# Patient Record
Sex: FEMALE | Race: WHITE | NOT HISPANIC OR LATINO | Employment: OTHER | URBAN - METROPOLITAN AREA
[De-identification: names, ages, dates, MRNs, and addresses within clinical notes are randomized per-mention and may not be internally consistent; named-entity substitution may affect disease eponyms.]

---

## 2017-01-02 RX ORDER — FLUVASTATIN SODIUM 80 MG/1
TABLET, FILM COATED, EXTENDED RELEASE ORAL
Qty: 30 TABLET | Refills: 11 | Status: SHIPPED | OUTPATIENT
Start: 2017-01-02 | End: 2017-02-16

## 2017-01-17 ENCOUNTER — CONSULT (OUTPATIENT)
Dept: SLEEP MEDICINE | Facility: HOSPITAL | Age: 72
End: 2017-01-17

## 2017-01-17 VITALS
HEART RATE: 75 BPM | WEIGHT: 171 LBS | BODY MASS INDEX: 28.49 KG/M2 | DIASTOLIC BLOOD PRESSURE: 82 MMHG | HEIGHT: 65 IN | SYSTOLIC BLOOD PRESSURE: 138 MMHG

## 2017-01-17 DIAGNOSIS — R29.818 SUSPECTED SLEEP APNEA: Primary | ICD-10-CM

## 2017-01-17 DIAGNOSIS — I10 ESSENTIAL HYPERTENSION: ICD-10-CM

## 2017-01-17 PROCEDURE — 99204 OFFICE O/P NEW MOD 45 MIN: CPT | Performed by: INTERNAL MEDICINE

## 2017-01-17 RX ORDER — ALPRAZOLAM 0.25 MG/1
TABLET ORAL
COMMUNITY
Start: 2016-11-22 | End: 2017-02-16

## 2017-01-17 NOTE — MR AVS SNAPSHOT
[facility] SLEEP MEDICINE  [phone]                    Leila Pierre   1/17/2017 10:30 AM   Consult    Dept Phone:  [phone]   Encounter #:  [id number]    Provider:  Brian Raza MD   Department:  [facility] SLEEP MEDICINE                Your Full Care Plan              Your Updated Medication List          This list is accurate as of: 1/17/17 11:33 AM.  Always use your most recent med list.                ALPRAZolam 0.25 MG tablet   Commonly known as:  XANAX       amLODIPine 10 MG tablet   Commonly known as:  NORVASC       aspirin 81 MG EC tablet       azilsartan medoxomil 80 MG tablet tablet   Commonly known as:  EDARBI   Take 1 tablet by mouth Daily.       BIOTIN 5000 PO       CALCIUM 600 PO       carvedilol 25 MG tablet   Commonly known as:  COREG   Take 1 tablet by mouth 2 (Two) Times a Day With Meals.       cholecalciferol 1000 UNITS tablet   Commonly known as:  VITAMIN D3       CloNIDine 0.1 MG/24HR patch   Commonly known as:  CATAPRES-TTS   Place 1 patch on the skin 1 (One) Time Per Week.       famotidine 20 MG tablet   Commonly known as:  PEPCID       fexofenadine 180 MG tablet   Commonly known as:  ALLEGRA       fish oil 1200 MG capsule capsule       fluvastatin XL 80 MG 24 hr tablet   Commonly known as:  LESCOL XL   TAKE ONE TABLET BY MOUTH EVERY NIGHT AT BEDTIME       furosemide 20 MG tablet   Commonly known as:  LASIX   Take 1 tablet by mouth 2 (Two) Times a Day.       levothyroxine 88 MCG tablet   Commonly known as:  SYNTHROID, LEVOTHROID       metFORMIN 500 MG tablet   Commonly known as:  GLUCOPHAGE       potassium chloride 10 MEQ CR tablet   Commonly known as:  KLOR-CON   Take 1 tablet by mouth 2 (Two) Times a Day.               You Were Diagnosed With        Codes Comments    Suspected sleep apnea    -  Primary ICD-10-CM: G47.30  ICD-9-CM: 780.57     Essential hypertension     ICD-10-CM: I10  ICD-9-CM: 401.9       Instructions     None    Patient Instructions History      Upcoming Appointments     Visit Type Date Time Department    CONSULT 1/17/2017 10:30 AM [facility]EP [facility]    FOLLOW UP 1/24/2017 10:00 AM [facility] [place]    HOME SLEEP STUDY 1/26/2017  1:15 PM  [facility]    FOLLOW UP 2/16/2017 11:45 AM [facility]    FOLLOW UP 3/28/2017 10:30 AM [facility]      MyChart Signup     Our records indicate that you have an active [employer] day[employer]a account.    You can view your After Visit Summary by going to Wiren Board and logging in with your Fotoshkola username and password.  If you don't have a Fotoshkola username and password but a parent or guardian has access to your record, the parent or guardian should login with their own Fotoshkola username and password and access your record to view the After Visit Summary.    If you have questions, you can email [email or web address] or call [phone] to talk to our Fotoshkola staff.  Remember, Fotoshkola is NOT to be used for urgent needs.  For medical emergencies, dial 911.               Other Info from Your Visit           Your Appointments     Jan 24, 2017 10:00 AM EST   Follow Up with Ike Cifuentes MD   [facility] FAMILY MEDICINE (--)    [address]  [facility] [address] [phone]           Arrive 15 minutes prior to appointment.            Jan 26, 2017  1:15 PM EST   HOME SLEEP STUDY with  MIGUEL HST PDX 1   [facility] SLEEP LAB ([place])    [address] [id number] [phone]            Feb 16, 2017 11:45 AM EST   Follow Up with Uziel Meza MD   [facility] CARDIOLOGY (--)    [address] [place] [address] [address]  [place] [id number] [phone]           Arrive 15 minutes prior to appointment.            Mar 28, 2017 10:30 AM EDT   Follow Up with Brian Raza MD   [facility] SLEEP MEDICINE (--)    [facility]  "[place] [address] [address] [phone]           Please bring completed new patient packets that were mailed to you prior to follow up as well as bringing a copy of insurnace card and photo ID to visit. Please bring downloadable chips/cards out of machines if you are on PAP therapy.              Allergies     Clarithromycin      Decongest-aid [Pseudoephedrine]      Gives too much energy and patient is unable to sleep    Irbesartan      Sulfa Antibiotics      Valsartan        Reason for Visit     Sleeping Problem           Vital Signs     Blood Pressure Pulse Height Weight Body Mass Index Smoking Status    138/82 75 65\" (165.1 cm) 171 lb (77.6 kg) 28.46 kg/m2 Never Smoker      Problems and Diagnoses Noted     High blood pressure    Suspected sleep apnea        "

## 2017-01-17 NOTE — PROGRESS NOTES
Subjective   Leila Pierre is a 71 y.o. female.   Chief Complaint   Patient presents with   • Sleeping Problem       HPI     71 y.o. female seen in consultation at the request of Uziel Meza MD for evaluation of the above.     She has had increasingly difficult to control blood pressure.  She suddenly had blood pressures up in the 200 systolic range.  She is requiring 3 medications and a clonidine patch currently.  She saw Dr. Meza who adjusted her medications.  He thought obstructive sleep apnea may be contributing to her poorly controlled blood pressure.  She does not have much in the way of daytime somnolence.  She is aware of sleep apnea and CPAP therapy as her  is on a CPAP.    Further details are as follows:    [place] Scale is: 1/24    Estimated average amount of sleep per night: 7  Number of times she wakes up at night: 1  Difficulty falling back asleep: no  It usually takes 5 minutes to go to sleep.  She feels sleepy upon waking up: yes  Rotating or night shift work: no    Drowsiness/Sleepiness:  She exhibits the following:  none    Snoring/Breathing:  She exhibits the following:  none    Reflux:  She describes the following:  wakes up at night with a sour taste or burning sensation in chest  takes medication for reflux  eats 3 meals daily     Narcolepsy:  She exhibits the following:  none    RLS/PLMs:  She describes the following:  none    Insomnia:  She describes the following:  none    Parasomnia:  She exhibits the following:  none    Neurological:  She has a history of the following:  none    Weight:  Weight change in the last year:  loss: 20 lbs      The patient's relevant past medical, surgical, family, and social history reviewed and updated in Epic as appropriate.    Current medications are:   Current Outpatient Prescriptions:   •  amLODIPine (NORVASC) 10 MG tablet, Take 10 mg by mouth Daily., Disp: , Rfl:   •  aspirin 81 MG EC tablet, Take 81 mg by mouth Daily., Disp: , Rfl:   •   azilsartan medoxomil (EDARBI) 80 MG tablet tablet, Take 1 tablet by mouth Daily., Disp: 30 tablet, Rfl: 11  •  BIOTIN 5000 PO, Take 1 tablet by mouth Daily., Disp: , Rfl:   •  Calcium Carbonate (CALCIUM 600 PO), Take 1 tablet by mouth Daily., Disp: , Rfl:   •  carvedilol (COREG) 25 MG tablet, Take 1 tablet by mouth 2 (Two) Times a Day With Meals., Disp: 60 tablet, Rfl: 11  •  cholecalciferol (VITAMIN D3) 1000 UNITS tablet, Take 1,000 Units by mouth Daily., Disp: , Rfl:   •  CloNIDine (CATAPRES-TTS) 0.1 MG/24HR patch, Place 1 patch on the skin 1 (One) Time Per Week., Disp: 4 patch, Rfl: 6  •  famotidine (PEPCID) 20 MG tablet, Take 20 mg by mouth 2 (Two) Times a Day., Disp: , Rfl:   •  fexofenadine (ALLEGRA) 180 MG tablet, Take 180 mg by mouth Daily., Disp: , Rfl:   •  furosemide (LASIX) 20 MG tablet, Take 1 tablet by mouth 2 (Two) Times a Day., Disp: 180 tablet, Rfl: 3  •  levothyroxine (SYNTHROID, LEVOTHROID) 88 MCG tablet, Take 88 mcg by mouth daily., Disp: , Rfl:   •  metFORMIN (GLUCOPHAGE) 500 MG tablet, Take 500 mg by mouth 4 (Four) Times a Day., Disp: , Rfl:   •  Omega-3 Fatty Acids (FISH OIL) 1200 MG capsule capsule, Take 1,200 mg by mouth 2 (Two) Times a Day., Disp: , Rfl:   •  potassium chloride (KLOR-CON) 10 MEQ CR tablet, Take 1 tablet by mouth 2 (Two) Times a Day., Disp: 180 tablet, Rfl: 3  •  ALPRAZolam (XANAX) 0.25 MG tablet, , Disp: , Rfl:   •  fluvastatin XL (LESCOL XL) 80 MG 24 hr tablet, TAKE ONE TABLET BY MOUTH EVERY NIGHT AT BEDTIME, Disp: 30 tablet, Rfl: 11.    Review of Systems    Review of Systems   Respiratory: Negative for shortness of breath.    Cardiovascular: Negative for chest pain and palpitations.   Musculoskeletal: Negative for neck pain.   Neurological: Negative for headaches.   All other systems reviewed and are negative.    ROS documented in patient questionnaire ×12 systems.  Reviewed with patient.  Otherwise negative except as noted in HPI.    Physical Exam    Blood pressure  "138/82, pulse 75, height 65\" (165.1 cm), weight 171 lb (77.6 kg). Body mass index is 28.46 kg/(m^2).    Physical Exam   Constitutional: She is oriented to person, place, and time. She appears well-developed and well-nourished.   HENT:   Head: Normocephalic and atraumatic.   Nose: Nose normal.   Mouth/Throat: Oropharynx is clear and moist. No oropharyngeal exudate.   Eyes: Conjunctivae are normal. Pupils are equal, round, and reactive to light. No scleral icterus.   Neck: Neck supple. No tracheal deviation present. No thyromegaly present.   Cardiovascular: Normal rate and regular rhythm.  Exam reveals no gallop and no friction rub.    No murmur heard.  Pulmonary/Chest: Effort normal. No respiratory distress. She has no wheezes. She has no rales. She exhibits no tenderness.   Abdominal: Soft. Bowel sounds are normal. She exhibits no distension. There is no tenderness.   Musculoskeletal: She exhibits no edema or deformity.   Neurological: She is alert and oriented to person, place, and time.   Skin: Skin is warm and dry. No rash noted.   Psychiatric: She has a normal mood and affect. Her behavior is normal. Judgment and thought content normal.   Nursing note and vitals reviewed.      ASSESSMENT:    Problem List Items Addressed This Visit     Suspected sleep apnea - Primary    Relevant Orders    Home Sleep Study    Hypertension    Relevant Orders    Home Sleep Study          Evidence of obstructive sleep apnea based upon difficult to control blood pressure as well as increased neck circumference.    PLAN:    The risks of untreated obstructive sleep apnea were discussed as well as treatment options. I have ordered a home polysomnogram. We discussed initiating CPAP if appropriate.  There will be a long-term goal of weight loss. I have asked her to follow-up after the sleep study is complete.    I have reviewed the results of my evaluation and impressions with the patient and/or appropriate family.    Thank you very much " for allowing me to participate in the care of your patient.        Signed by  Brian Raza MD    January 17, 2017      CC: MD Derrick Champion Michael R, MD

## 2017-01-24 ENCOUNTER — HOSPITAL ENCOUNTER (OUTPATIENT)
Dept: GENERAL RADIOLOGY | Facility: HOSPITAL | Age: 72
Discharge: HOME | End: 2017-01-24
Attending: FAMILY MEDICINE | Admitting: FAMILY MEDICINE

## 2017-01-24 ENCOUNTER — OFFICE VISIT (OUTPATIENT)
Dept: FAMILY MEDICINE CLINIC | Facility: CLINIC | Age: 72
End: 2017-01-24

## 2017-01-24 VITALS
HEART RATE: 66 BPM | BODY MASS INDEX: 28.46 KG/M2 | HEIGHT: 65 IN | OXYGEN SATURATION: 98 % | DIASTOLIC BLOOD PRESSURE: 72 MMHG | SYSTOLIC BLOOD PRESSURE: 112 MMHG | TEMPERATURE: 97.8 F | WEIGHT: 170.8 LBS | RESPIRATION RATE: 16 BRPM

## 2017-01-24 DIAGNOSIS — G89.29 CHRONIC MIDLINE THORACIC BACK PAIN: ICD-10-CM

## 2017-01-24 DIAGNOSIS — E11.9 TYPE 2 DIABETES MELLITUS WITHOUT COMPLICATION, WITHOUT LONG-TERM CURRENT USE OF INSULIN (HCC): Primary | ICD-10-CM

## 2017-01-24 DIAGNOSIS — M54.6 CHRONIC MIDLINE THORACIC BACK PAIN: ICD-10-CM

## 2017-01-24 DIAGNOSIS — I10 ESSENTIAL HYPERTENSION: ICD-10-CM

## 2017-01-24 LAB — HBA1C MFR BLD: 5.9 %

## 2017-01-24 PROCEDURE — 83036 HEMOGLOBIN GLYCOSYLATED A1C: CPT | Performed by: FAMILY MEDICINE

## 2017-01-24 PROCEDURE — 72072 X-RAY EXAM THORAC SPINE 3VWS: CPT

## 2017-01-24 PROCEDURE — 99214 OFFICE O/P EST MOD 30 MIN: CPT | Performed by: FAMILY MEDICINE

## 2017-01-24 RX ORDER — FUROSEMIDE 20 MG/1
20 TABLET ORAL 2 TIMES DAILY
Qty: 180 TABLET | Refills: 1 | Status: SHIPPED | OUTPATIENT
Start: 2017-01-24 | End: 2017-07-25 | Stop reason: SDUPTHER

## 2017-01-24 RX ORDER — BACLOFEN 10 MG/1
10 TABLET ORAL 3 TIMES DAILY
Qty: 30 TABLET | Refills: 1 | Status: SHIPPED | OUTPATIENT
Start: 2017-01-24 | End: 2017-02-16

## 2017-01-24 NOTE — MR AVS SNAPSHOT
Leila Pierre   1/24/2017 10:00 AM   Office Visit    Provider:  Ike Cifuentes MD   Department:  [facility] FAMILY MEDICINE   Dept Phone:  [phone]                Your Full Care Plan              Today's Medication Changes          These changes are accurate as of: 1/24/17 10:29 AM.  If you have any questions, ask your nurse or doctor.               New Medication(s)Ordered:     baclofen 10 MG tablet   Commonly known as:  LIORESAL   Take 1 tablet by mouth 3 (Three) Times a Day.   Started by:  Ike Cifuentes MD            Where to Get Your Medications      These medications were sent to [address] [facility] AT [facility] - [phone]  - [phone] FX  [address] [facility] [address]     Phone:  [phone]     baclofen 10 MG tablet    furosemide 20 MG tablet                  Your Updated Medication List          This list is accurate as of: 1/24/17 10:29 AM.  Always use your most recent med list.                ALPRAZolam 0.25 MG tablet   Commonly known as:  XANAX       amLODIPine 10 MG tablet   Commonly known as:  NORVASC       aspirin 81 MG EC tablet       azilsartan medoxomil 80 MG tablet tablet   Commonly known as:  EDARBI   Take 1 tablet by mouth Daily.       baclofen 10 MG tablet   Commonly known as:  LIORESAL   Take 1 tablet by mouth 3 (Three) Times a Day.       BIOTIN 5000 PO       CALCIUM 600 PO       carvedilol 25 MG tablet   Commonly known as:  COREG   Take 1 tablet by mouth 2 (Two) Times a Day With Meals.       cholecalciferol 1000 UNITS tablet   Commonly known as:  VITAMIN D3       CloNIDine 0.1 MG/24HR patch   Commonly known as:  CATAPRES-TTS   Place 1 patch on the skin 1 (One) Time Per Week.       famotidine 20 MG tablet   Commonly known as:  PEPCID       fexofenadine 180 MG tablet   Commonly known as:  ALLEGRA       fish oil 1200 MG capsule capsule       fluvastatin XL 80 MG 24 hr tablet    Commonly known as:  LESCOL XL   TAKE ONE TABLET BY MOUTH EVERY NIGHT AT BEDTIME       furosemide 20 MG tablet   Commonly known as:  LASIX   Take 1 tablet by mouth 2 (Two) Times a Day.       levothyroxine 88 MCG tablet   Commonly known as:  SYNTHROID, LEVOTHROID       metFORMIN 500 MG tablet   Commonly known as:  GLUCOPHAGE       potassium chloride 10 MEQ CR tablet   Commonly known as:  KLOR-CON   Take 1 tablet by mouth 2 (Two) Times a Day.               We Performed the Following     POC Glycosylated Hemoglobin (Hb A1C)       You Were Diagnosed With        Codes Comments    Type 2 diabetes mellitus without complication, without long-term current use of insulin    -  Primary ICD-10-CM: E11.9  ICD-9-CM: 250.00     Essential hypertension     ICD-10-CM: I10  ICD-9-CM: 401.9     Chronic midline thoracic back pain     ICD-10-CM: M54.6, G89.29  ICD-9-CM: 724.1, 338.29       Instructions     None    Patient Instructions History      Mpayyhart Signup     Our records indicate that you have an active TerraPower account.    You can view your After Visit Summary by going to [employer] and logging in with your Net 263 username and password.  If you don't have a Net 263 username and password but a parent or guardian has access to your record, the parent or guardian should login with their own Net 263 username and password and access your record to view the After Visit Summary.    If you have questions, you can email [email or web address] or call [phone] to talk to our Net 263 staff.  Remember, Net 263 is NOT to be used for urgent needs.  For medical emergencies, dial 911.               Other Info from Your Visit           Your Appointments     Jan 26, 2017  1:15 PM EST   HOME SLEEP STUDY with  MIGUEL HST PDX 1   [facility] SLEEP LAB ([place])    [address] [facility] [address] [phone]            Feb 16, 2017 11:45 AM EST   Follow Up with Uziel Meza,  "MD   [facility] CARDIOLOGY (--)    [address]  [facility] [id number] [phone]           Arrive 15 minutes prior to appointment.            Mar 28, 2017 10:30 AM EDT   Follow Up with Brian Raza MD   [facility] SLEEP MEDICINE (--)    [address]  [facility] [id number] [phone]           Please bring completed new patient packets that were mailed to you prior to follow up as well as bringing a copy of insurnace card and photo ID to visit. Please bring downloadable chips/cards out of machines if you are on PAP therapy.            Apr 25, 2017 10:00 AM EDT   Office Visit with Ike Cifuentes MD   [facility] FAMILY MEDICINE (--)    [address]  [facility] [id number] [phone]           Arrive 15 minutes prior to appointment.              Allergies     Clarithromycin      Decongest-aid [Pseudoephedrine]      Gives too much energy and patient is unable to sleep    Irbesartan      Sulfa Antibiotics      Valsartan        Reason for Visit     Hypertension 2 month f/u     Diabetes           Vital Signs     Blood Pressure Pulse Temperature Respirations Height Weight    112/72 66 97.8 °F (36.6 °C) 16 65\" (165.1 cm) 170 lb 12.8 oz (77.5 kg)    Oxygen Saturation Body Mass Index Smoking Status             98% 28.42 kg/m2 Never Smoker         Problems and Diagnoses Noted     Diabetes    High blood pressure    Chronic midline thoracic back pain          Results     POC Glycosylated Hemoglobin (Hb A1C)      Component Value Standard Range & Units    Hemoglobin A1C 5.9 %                  "

## 2017-01-25 NOTE — PROGRESS NOTES
Subjective   Leila Pierre is a 71 y.o. female.     Hypertension   This is a chronic problem. The problem is resistant. Pertinent negatives include no chest pain, headaches, palpitations, peripheral edema or shortness of breath. Risk factors for coronary artery disease include post-menopausal state, sedentary lifestyle and stress. Past treatments include central alpha agonists, calcium channel blockers, angiotensin blockers, beta blockers and diuretics. The current treatment provides moderate improvement. There are no compliance problems.  There is no history of angina, kidney disease, CAD/MI or left ventricular hypertrophy.   Diabetes   She presents for her follow-up diabetic visit. She has type 2 diabetes mellitus. Her disease course has been stable. Pertinent negatives for hypoglycemia include no dizziness or headaches. Pertinent negatives for diabetes include no chest pain, no fatigue, no polydipsia, no polyphagia, no polyuria, no visual change and no weakness. There are no hypoglycemic complications. Symptoms are stable. There are no diabetic complications. Current diabetic treatment includes oral agent (monotherapy). She is compliant with treatment most of the time. An ACE inhibitor/angiotensin II receptor blocker is being taken. Eye exam is current.   Back Pain   This is a new problem. The current episode started 1 to 4 weeks ago. The problem occurs daily. The pain is present in the thoracic spine. The quality of the pain is described as aching. The pain does not radiate. Pertinent negatives include no bladder incontinence, bowel incontinence, chest pain, headaches, numbness or weakness. Risk factors: hx of OA. She has tried analgesics for the symptoms. The treatment provided moderate relief.        The following portions of the patient's history were reviewed and updated as appropriate: allergies, current medications, past social history and problem list.    Review of Systems   Constitutional: Negative  "for appetite change, diaphoresis, fatigue and unexpected weight change.   Eyes: Negative for visual disturbance.   Respiratory: Negative for cough, chest tightness and shortness of breath.    Cardiovascular: Negative for chest pain, palpitations and leg swelling.   Gastrointestinal: Negative for bowel incontinence, diarrhea, nausea and vomiting.   Endocrine: Negative for polydipsia, polyphagia and polyuria.   Genitourinary: Negative for bladder incontinence.   Musculoskeletal: Positive for back pain.   Skin: Negative for color change and rash.   Neurological: Negative for dizziness, syncope, weakness, light-headedness, numbness and headaches.       Objective   Visit Vitals   • /72   • Pulse 66   • Temp 97.8 °F (36.6 °C)   • Resp 16   • Ht 65\" (165.1 cm)   • Wt 170 lb 12.8 oz (77.5 kg)   • SpO2 98%   • BMI 28.42 kg/m2     Physical Exam   Constitutional: She is oriented to person, place, and time. She appears well-developed and well-nourished. She is cooperative.   HENT:   Head: Normocephalic.   Nose: Nose normal.   Mouth/Throat: Oropharynx is clear and moist.   Eyes: Conjunctivae are normal. Pupils are equal, round, and reactive to light. No scleral icterus.   Neck: Neck supple. Carotid bruit is not present. No thyromegaly present.   Cardiovascular: Normal rate and regular rhythm.    Pulmonary/Chest: Effort normal and breath sounds normal. She has no wheezes.   Abdominal: There is no hepatosplenomegaly.   Musculoskeletal: Normal range of motion. She exhibits no edema or tenderness.   Localizes back pain to lower t spine area   Neurological: She is alert and oriented to person, place, and time.   No focal deficits no lateralizing signs   Skin: Skin is warm and dry. No rash noted.   Psychiatric: She has a normal mood and affect. Cognition and memory are normal.   Nursing note and vitals reviewed.      Assessment/Plan   Problem List Items Addressed This Visit        Cardiovascular and Mediastinum    Hypertension "    Relevant Medications    furosemide (LASIX) 20 MG tablet       Endocrine    Diabetes mellitus - Primary    Relevant Orders    POC Glycosylated Hemoglobin (Hb A1C) (Completed)      Other Visit Diagnoses     Chronic midline thoracic back pain        Relevant Orders    XR Spine Thoracic 3 View (Completed)          New Medications Ordered This Visit   Medications   • furosemide (LASIX) 20 MG tablet     Sig: Take 1 tablet by mouth 2 (Two) Times a Day.     Dispense:  180 tablet     Refill:  1   • baclofen (LIORESAL) 10 MG tablet     Sig: Take 1 tablet by mouth 3 (Three) Times a Day.     Dispense:  30 tablet     Refill:  1

## 2017-02-16 ENCOUNTER — OFFICE VISIT (OUTPATIENT)
Dept: CARDIOLOGY | Facility: CLINIC | Age: 72
End: 2017-02-16

## 2017-02-16 VITALS
HEIGHT: 65 IN | DIASTOLIC BLOOD PRESSURE: 95 MMHG | SYSTOLIC BLOOD PRESSURE: 171 MMHG | BODY MASS INDEX: 28.86 KG/M2 | HEART RATE: 62 BPM | WEIGHT: 173.2 LBS

## 2017-02-16 DIAGNOSIS — I10 ESSENTIAL HYPERTENSION: Primary | ICD-10-CM

## 2017-02-16 DIAGNOSIS — E78.5 HYPERLIPIDEMIA LDL GOAL <70: ICD-10-CM

## 2017-02-16 PROCEDURE — 99212 OFFICE O/P EST SF 10 MIN: CPT | Performed by: INTERNAL MEDICINE

## 2017-02-16 RX ORDER — ROSUVASTATIN CALCIUM 5 MG/1
5 TABLET, COATED ORAL DAILY
Qty: 90 TABLET | Refills: 3 | Status: SHIPPED | OUTPATIENT
Start: 2017-02-16 | End: 2017-03-23

## 2017-02-16 NOTE — PROGRESS NOTES
OFFICE FOLLOW UP     Date of Encounter:2017     Name: Leila Pierre  : 1945  Address: [address] [facility] [address]  Phone: [phone]    PCP: Ike Cifuentes MD  [address]  [facility] [address]    Leila Pierre is a  71 y.o. female from [place], [place].     Chief Complaint: hypertension    PROBLEM LIST:   1. HTN, uncontrolled recently   a. Onset 40 years ago   B. Renal artery duplex normal with no evidence of PAMELA, 10/17/2016.              C. Negative pheo evaluation.               D.  Sleep study pending.   2. Dyslipidemia with multiple statin intolerances.  3. DM2   4. Hypothyroidism    Allergies   Allergen Reactions   • Clarithromycin    • Decongest-Aid [Pseudoephedrine]      Gives too much energy and patient is unable to sleep   • Irbesartan    • Lescol [Fluvastatin Sodium] Diarrhea   • Lipitor [Atorvastatin] Myalgia   • Other      bandaids    • Sulfa Antibiotics    • Valsartan          Current Outpatient Prescriptions:   •  amLODIPine (NORVASC) 10 MG tablet, Take 10 mg by mouth Daily., Disp: , Rfl:   •  aspirin 81 MG EC tablet, Take 81 mg by mouth Daily., Disp: , Rfl:   •  azilsartan medoxomil (EDARBI) 80 MG tablet tablet, Take 1 tablet by mouth Daily., Disp: 30 tablet, Rfl: 11  •  BIOTIN 5000 PO, Take 1 tablet by mouth Daily., Disp: , Rfl:   •  Calcium Carbonate (CALCIUM 600 PO), Take 1 tablet by mouth Daily., Disp: , Rfl:   •  carvedilol (COREG) 25 MG tablet, Take 1 tablet by mouth 2 (Two) Times a Day With Meals., Disp: 60 tablet, Rfl: 11  •  cholecalciferol (VITAMIN D3) 1000 UNITS tablet, Take 1,000 Units by mouth Daily., Disp: , Rfl:   •  CloNIDine (CATAPRES-TTS) 0.1 MG/24HR patch, Place 1 patch on the skin 1 (One) Time Per Week., Disp: 4 patch, Rfl: 6  •  famotidine (PEPCID) 20 MG tablet, Take 20 mg by mouth 2 (Two) Times a Day., Disp: , Rfl:   •  fexofenadine (ALLEGRA) 180 MG tablet, Take 180 mg by mouth Daily., Disp: , Rfl:   •  furosemide  (LASIX) 20 MG tablet, Take 1 tablet by mouth 2 (Two) Times a Day., Disp: 180 tablet, Rfl: 1  •  levothyroxine (SYNTHROID, LEVOTHROID) 88 MCG tablet, Take 88 mcg by mouth daily., Disp: , Rfl:   •  metFORMIN (GLUCOPHAGE) 500 MG tablet, Take 500 mg by mouth 4 (Four) Times a Day., Disp: , Rfl:   •  Omega-3 Fatty Acids (FISH OIL) 1200 MG capsule capsule, Take 1,200 mg by mouth 2 (Two) Times a Day., Disp: , Rfl:   •  potassium chloride (KLOR-CON) 10 MEQ CR tablet, Take 1 tablet by mouth 2 (Two) Times a Day., Disp: 180 tablet, Rfl: 3    History of Present Illness:           Answers for HPI/ROS submitted by the patient on 2/9/2017   Hypertension  Chronicity: recurrent  Onset: more than 1 year ago  Progression since onset: waxing and waning  Condition status: resistant  peripheral edema: Yes  Agents associated with hypertension: NSAIDs, thyroid hormones  CAD risks: diabetes mellitus, dyslipidemia, obesity  Compliance problems: exercise, medication side effects    Ms. Pierre presents today for follow up of hypertension and dyslipidemia. Since last visit, she has been taking her blood pressure at home and reports average pressures within normal limits (130s-140s systolic). She notes that her pressure tends to be lower in the morning. She has stopped her Lescol-XL secondary to symptoms of diarrhea. She complains of new-onset lower extremity edema secondary to her medical therapy. She explains that her edema does not occur daily, but is exacerbated by sitting or standing for long periods of time. Patient denies chest pain, palpitations, shortness of breath, PND, orthopnea, dizziness, and syncope. She has an at-home sleep study scheduled for Thursday. She has had documented intolerances to other statins (no recall or records) but documended to Lipitor and Lescol-XL    The following portions of the patient's history were reviewed and updated as appropriate: allergies, current medications and problem list.    HPI: Pertinent  "positives as listed in the HPI.  All other systems reviewed and negative.    Objective:    Vitals:    02/16/17 1137 02/16/17 1139   BP: (!) 183/95 171/95   BP Location: Right arm Right arm   Patient Position: Sitting Standing   Pulse: 62    Weight: 173 lb 3.2 oz (78.6 kg)    Height: 65\" (165.1 cm)        Physical Exam   Constitutional: She is oriented to person, place, and time.   Neck: No JVD present. No thyromegaly present.   Cardiovascular: Intact distal pulses.    Exam reveals no gallop, murmur, or rub.   Pulmonary/Chest:   No wheezes, crackles, or rhonchi.   Musculoskeletal:   No peripheral edema, no clubbing, or cyanosis    Neurological: She is alert and oriented to person, place, and time.   No focal abnormalities in sensation or strength    Vitals reviewed.    Diagnostic Data:      Lab Results   Component Value Date    GLUCOSE 162 (H) 12/08/2016    CALCIUM 10.5 (H) 12/08/2016     12/08/2016    K 4.7 12/08/2016    CO2 24.0 12/08/2016     12/08/2016    BUN 17 12/08/2016    CREATININE 1.00 12/08/2016    EGFRIFAFRI 77 02/26/2015    EGFRIFNONA 55 (L) 12/08/2016    BCR 17.0 12/08/2016    ANIONGAP 10.0 12/08/2016     Lab Results   Component Value Date    GLUCOSE 162 (H) 12/08/2016    BUN 17 12/08/2016    CREATININE 1.00 12/08/2016    EGFRIFNONA 55 (L) 12/08/2016    EGFRIFAFRI 77 02/26/2015    BCR 17.0 12/08/2016    CO2 24.0 12/08/2016    CALCIUM 10.5 (H) 12/08/2016    PROTENTOTREF 6.9 02/26/2015    ALBUMIN 4.50 10/10/2016    LABIL2 1.6 10/10/2016    AST 48 (H) 10/10/2016    ALT 86 (H) 10/10/2016     Lab Results   Component Value Date    CHLPL 223 (H) 09/01/2015    TRIG 219 (H) 07/26/2016    HDL 41 07/26/2016    LDLDIRECT 174 (H) 07/26/2016    AST 48 (H) 10/10/2016    ALT 86 (H) 10/10/2016     Lab Results   Component Value Date    WBC 7.36 10/10/2016    HGB 14.2 10/10/2016    HCT 42.8 10/10/2016    MCV 92.0 10/10/2016     10/10/2016     Procedures    Assessment and Plan:    1. Start 5 mg Crestor " each evening. If fails, consider PCSK-9.  2. Continue current medications.   3. Follow up in 1 month with CMP or sooner if needed.      Scribed for Uziel Meza MD by Estephania Martinez. 2/16/2017  1:00 PM    I, Uziel Meza MD, personally performed the services described in this documentation as scribed by the above named individual in my presence, and it is both accurate and complete.  2/16/2017  1:00 PM

## 2017-02-23 ENCOUNTER — HOSPITAL ENCOUNTER (OUTPATIENT)
Dept: SLEEP MEDICINE | Facility: HOSPITAL | Age: 72
Discharge: HOME | End: 2017-02-23
Attending: INTERNAL MEDICINE | Admitting: INTERNAL MEDICINE

## 2017-02-23 VITALS
WEIGHT: 177 LBS | HEIGHT: 65 IN | SYSTOLIC BLOOD PRESSURE: 151 MMHG | RESPIRATION RATE: 16 BRPM | HEART RATE: 68 BPM | OXYGEN SATURATION: 95 % | BODY MASS INDEX: 29.49 KG/M2 | DIASTOLIC BLOOD PRESSURE: 81 MMHG

## 2017-02-23 PROCEDURE — 95806 SLEEP STUDY UNATT&RESP EFFT: CPT | Performed by: INTERNAL MEDICINE

## 2017-02-24 DIAGNOSIS — R09.89 LABILE HYPERTENSION: ICD-10-CM

## 2017-02-24 DIAGNOSIS — G47.33 OBSTRUCTIVE SLEEP APNEA, ADULT: Primary | ICD-10-CM

## 2017-02-24 DIAGNOSIS — R06.83 SNORING: ICD-10-CM

## 2017-02-24 PROCEDURE — 95806 SLEEP STUDY UNATT&RESP EFFT: CPT | Performed by: INTERNAL MEDICINE

## 2017-02-27 NOTE — PROGRESS NOTES
Patient did not want to be set up on pap therapy at this time. She said she might think about it but also wanted to keep her F/U appointment.

## 2017-03-14 ENCOUNTER — OFFICE VISIT (OUTPATIENT)
Dept: FAMILY MEDICINE CLINIC | Facility: CLINIC | Age: 72
End: 2017-03-14

## 2017-03-14 VITALS
OXYGEN SATURATION: 97 % | DIASTOLIC BLOOD PRESSURE: 76 MMHG | BODY MASS INDEX: 28.42 KG/M2 | HEIGHT: 65 IN | RESPIRATION RATE: 16 BRPM | TEMPERATURE: 98.3 F | WEIGHT: 170.6 LBS | SYSTOLIC BLOOD PRESSURE: 122 MMHG | HEART RATE: 78 BPM

## 2017-03-14 DIAGNOSIS — I10 ESSENTIAL HYPERTENSION: ICD-10-CM

## 2017-03-14 DIAGNOSIS — J01.01 ACUTE RECURRENT MAXILLARY SINUSITIS: Primary | ICD-10-CM

## 2017-03-14 PROCEDURE — 99213 OFFICE O/P EST LOW 20 MIN: CPT | Performed by: FAMILY MEDICINE

## 2017-03-14 RX ORDER — BACLOFEN 10 MG/1
10 TABLET ORAL 3 TIMES DAILY PRN
COMMUNITY
Start: 2017-03-04 | End: 2017-03-23

## 2017-03-14 RX ORDER — CEFDINIR 300 MG/1
CAPSULE ORAL
Qty: 20 CAPSULE | Refills: 0 | Status: SHIPPED | OUTPATIENT
Start: 2017-03-14 | End: 2017-04-25

## 2017-03-14 NOTE — PROGRESS NOTES
"Subjective   Leila Pierre is a 71 y.o. female.     Sinusitis   This is a new problem. The current episode started in the past 7 days. The problem is unchanged. There has been no fever. The pain is mild. Associated symptoms include chills, congestion, ear pain and sinus pressure. Pertinent negatives include no coughing, headaches, shortness of breath or sore throat. Past treatments include nothing. The treatment provided no relief.        The following portions of the patient's history were reviewed and updated as appropriate: allergies, current medications, past social history and problem list.    Review of Systems   Constitutional: Positive for chills. Negative for fatigue, fever and unexpected weight change.   HENT: Positive for congestion, ear pain, postnasal drip, rhinorrhea and sinus pressure. Negative for sore throat.    Eyes: Positive for pain.   Respiratory: Negative for cough, chest tightness and shortness of breath.    Cardiovascular: Negative for chest pain, palpitations and leg swelling.   Gastrointestinal: Negative for nausea.   Skin: Negative for color change and rash.   Neurological: Negative for dizziness, syncope, weakness and headaches.   Hematological: Negative for adenopathy.       Objective   Visit Vitals   • /76   • Pulse 78   • Temp 98.3 °F (36.8 °C)   • Resp 16   • Ht 65\" (165.1 cm)   • Wt 170 lb 9.6 oz (77.4 kg)   • LMP  (LMP Unknown)   • SpO2 97%   • BMI 28.39 kg/m2     Physical Exam   Constitutional: She is oriented to person, place, and time. She appears well-developed and well-nourished. She is cooperative.   HENT:   Head: Normocephalic.   Right Ear: External ear normal.   Left Ear: External ear normal.   Nose: Nose normal.   Mouth/Throat: Oropharynx is clear and moist.   LDL or postnasal drip a few scattered upper airway rhonchi   Eyes: Conjunctivae are normal. Pupils are equal, round, and reactive to light. No scleral icterus.   Neck: Neck supple. Carotid bruit is not " present. No thyromegaly present.   Cardiovascular: Normal rate and regular rhythm.    Pulmonary/Chest: Effort normal and breath sounds normal.   Abdominal: There is no hepatosplenomegaly.   Musculoskeletal: Normal range of motion.   Lymphadenopathy:     She has no cervical adenopathy.   Neurological: She is alert and oriented to person, place, and time.   No focal deficits no lateralizing signs   Skin: Skin is warm and dry. No rash noted.   Psychiatric: She has a normal mood and affect. Cognition and memory are normal.   Nursing note and vitals reviewed.      Assessment/Plan   Problem List Items Addressed This Visit        Cardiovascular and Mediastinum    Essential hypertension       Respiratory    Sinusitis - Primary          New Medications Ordered This Visit   Medications   • baclofen (LIORESAL) 10 MG tablet     Sig: Take 10 mg by mouth 3 (Three) Times a Day As Needed.   • cefdinir (OMNICEF) 300 MG capsule     Sig: Take 2 daily     Dispense:  20 capsule     Refill:  0     Return to clinic if symptoms persist or worsen.

## 2017-03-23 ENCOUNTER — OFFICE VISIT (OUTPATIENT)
Dept: CARDIOLOGY | Facility: CLINIC | Age: 72
End: 2017-03-23

## 2017-03-23 VITALS
HEART RATE: 78 BPM | WEIGHT: 168.2 LBS | HEIGHT: 66 IN | BODY MASS INDEX: 27.03 KG/M2 | DIASTOLIC BLOOD PRESSURE: 89 MMHG | SYSTOLIC BLOOD PRESSURE: 131 MMHG

## 2017-03-23 DIAGNOSIS — E78.00 HYPERCHOLESTEROLEMIA: ICD-10-CM

## 2017-03-23 DIAGNOSIS — I10 ESSENTIAL HYPERTENSION: Primary | ICD-10-CM

## 2017-03-23 PROCEDURE — 99212 OFFICE O/P EST SF 10 MIN: CPT | Performed by: INTERNAL MEDICINE

## 2017-03-23 NOTE — PROGRESS NOTES
OFFICE FOLLOW UP     Date of Encounter:2017     Name: Leila Pierre  : 1945  Address: [address] [facility] [address]  Phone: [phone]    PCP: Ike Cifuentes MD  [address]  [facility] [address]    Leila Pierre is a 71 y.o. female.      Chief Complaint: HTN, HLD    PROBLEM LIST:   1. HTN, uncontrolled recently   a. Onset 40 years ago   B. Renal artery duplex normal with no evidence of PAMELA, 10/17/2016.              C. Negative pheo evaluation.               D. Sleep study done; sleep appointment pending.  2. Dyslipidemia with multiple statin intolerances.  3. DM2   4. Hypothyroidism    No problems updated.    Allergies   Allergen Reactions   • Clarithromycin    • Decongest-Aid [Pseudoephedrine]      Gives too much energy and patient is unable to sleep   • Irbesartan    • Lescol [Fluvastatin Sodium] Diarrhea   • Lipitor [Atorvastatin] Myalgia   • Other      bandaids    • Sulfa Antibiotics    • Valsartan          Current Outpatient Prescriptions:   •  amLODIPine (NORVASC) 10 MG p.o. daily  •  aspirin 81 MG EC tablet p.o. daily  •  azilsartan medoxomil (EDARBI) 80 MG p.o. daily  •  BIOTIN 5000 PO, p.o. daily  •  Calcium Carbonate (CALCIUM 600 PO), p.o., daily  •  carvedilol (COREG) 25 MG tablet, p.o. BID  •  cefdinir (OMNICEF) 300 MG p.o. BID  •  cholecalciferol (VITAMIN D3) 1000 UNITS p.o. daily  •  famotidine (PEPCID) 20 MG p.o. BID  •  fexofenadine (ALLEGRA) 180 MG p.o. daily  •  furosemide (LASIX) 20 MG tablet p.o. BID  •  levothyroxine (SYNTHROID, LEVOTHROID) 88 MCG p.o. daily   •  metFORMIN (GLUCOPHAGE) 500 MG tablet, p.o. BID  •  Omega-3 Fatty Acids (FISH OIL) 1200 MG p.o. BID  •  potassium chloride (KLOR-CON) 10 MEQ CR tablet, p.o. BID  •  CloNIDine (CATAPRES-TTS) 0.3 MG/24HR patch, Place 1 patch on the skin 1 (One) Time Per Week., Disp: 4 patch, Rfl: 11    History of Present Illness:           Ms. Pierre returns today as a follow up for hypertension and  "hyperlipidemia. Since last visit, patient has been experiencing bouts of hypertension. She notes that when she wakes up in the morning, her blood pressure will be high. Then, once she takes her medication, her blood pressure starts coming back down within 30 minutes. Ms. Pierre reports she feels \"pretty well\" overall. She notes her diabetes has been under control as of late. She has been losing weight and monitoring her diet. She follows up with her diabetes doctor next week. Patient underwent her sleep study test and goes next week to review the results. She was told that the preliminary findings showed that she had a mild case. She plans on going on a cruise within the next few weeks.Most home BP readings show elevated BP in AM before AM medications.    The following portions of the patient's history were reviewed and updated as appropriate: allergies, current medications and problem list.    HPI: Pertinent positives as listed in the HPI.  All other systems reviewed and negative.      Objective:    Vitals:    03/23/17 1131 03/23/17 1132   BP: 144/91 131/89   BP Location: Right arm Right arm   Patient Position: Sitting Standing   Pulse: 75 78   Weight: 168 lb 3.2 oz (76.3 kg)    Height: 66\" (167.6 cm)        Physical Exam   Constitutional: She is oriented to person, place, and time.   Neck: No JVD present. No thyromegaly present.   Cardiovascular: Intact distal pulses.    Exam reveals no gallop, murmur, or rub.   Pulmonary/Chest:   No wheezes, crackles, or rhonchi.   Musculoskeletal:   No peripheral edema, no clubbing, or cyanosis    Neurological: She is alert and oriented to person, place, and time.   No focal abnormalities in sensation or strength    Vitals reviewed.         Diagnostic Data:      Lab Results   Component Value Date    GLUCOSE 162 (H) 12/08/2016    BUN 17 12/08/2016    CREATININE 1.00 12/08/2016    EGFRIFNONA 55 (L) 12/08/2016    EGFRIFAFRI 77 02/26/2015    BCR 17.0 12/08/2016    K 4.7 12/08/2016 "    CO2 24.0 12/08/2016    CALCIUM 10.5 (H) 12/08/2016    PROTENTOTREF 6.9 02/26/2015    ALBUMIN 4.50 10/10/2016    LABIL2 1.6 10/10/2016    AST 48 (H) 10/10/2016    ALT 86 (H) 10/10/2016     Lab Results   Component Value Date    HGBA1C 5.9 01/24/2017     Lab Results   Component Value Date    WBC 7.36 10/10/2016    HGB 14.2 10/10/2016    HCT 42.8 10/10/2016    MCV 92.0 10/10/2016     10/10/2016     Lab Results   Component Value Date    CHOL 237 (H) 07/26/2016    TRIG 219 (H) 07/26/2016    HDL 41 07/26/2016    LDLDIRECT 174 (H) 07/26/2016    AST 48 (H) 10/10/2016    ALT 86 (H) 10/10/2016     Lab Results   Component Value Date    TSH 1.532 07/26/2016        Procedures      Assessment and Plan:  1. Increase Clonidine patch from 0.1 to 0.3 weekly.  2. Follow up in 3 months.  3. Consider ambulatory BP monitor if BP not better at revisit.         Scribed for Uziel Meza MD by Anil Otero. 3/23/2017  1:38 PM    I, Uziel Meza MD, [facility], [facility], personally performed the services described in this documentation as scribed by the above named individual in my presence, and it is both accurate and complete. At 2:33 PM on 03/23/2017

## 2017-03-28 ENCOUNTER — OFFICE VISIT (OUTPATIENT)
Dept: SLEEP MEDICINE | Facility: HOSPITAL | Age: 72
End: 2017-03-28

## 2017-03-28 VITALS
DIASTOLIC BLOOD PRESSURE: 76 MMHG | OXYGEN SATURATION: 92 % | SYSTOLIC BLOOD PRESSURE: 118 MMHG | HEIGHT: 66 IN | BODY MASS INDEX: 27.23 KG/M2 | HEART RATE: 68 BPM | WEIGHT: 169.4 LBS

## 2017-03-28 DIAGNOSIS — G47.33 MILD OBSTRUCTIVE SLEEP APNEA: Primary | ICD-10-CM

## 2017-03-28 DIAGNOSIS — I10 ESSENTIAL HYPERTENSION: ICD-10-CM

## 2017-03-28 PROCEDURE — 99214 OFFICE O/P EST MOD 30 MIN: CPT | Performed by: INTERNAL MEDICINE

## 2017-03-28 NOTE — PROGRESS NOTES
Subjective:     Chief Complaint:   Chief Complaint   Patient presents with   • Follow-up       HPI:    Leila Pierre is a 71 y.o. female here for follow-up of her home sleep study.  She had a study on 2/23 that revealed a mild degree of obstructive sleep apnea.  Her AHI was 5.9.  It should be noted that she had significant amounts of wake time and that her oxygen saturations were low for 29 minutes.  Therefore, I felt her sleep apnea may have been underestimated.    She doesn't have much in the way of daytime somnolence but has had increasingly difficult to control blood pressure.  She has followed with Dr. Meza and he has performed an extensive workup for secondary causes of hypertension and has been escalating her blood pressure medicines.    Current medications are:   Current Outpatient Prescriptions:   •  amLODIPine (NORVASC) 10 MG tablet, Take 10 mg by mouth Daily., Disp: , Rfl:   •  aspirin 81 MG EC tablet, Take 81 mg by mouth Daily., Disp: , Rfl:   •  azilsartan medoxomil (EDARBI) 80 MG tablet tablet, Take 1 tablet by mouth Daily., Disp: 30 tablet, Rfl: 11  •  BIOTIN 5000 PO, Take 1 tablet by mouth Daily., Disp: , Rfl:   •  Calcium Carbonate (CALCIUM 600 PO), Take 1 tablet by mouth Daily., Disp: , Rfl:   •  carvedilol (COREG) 25 MG tablet, Take 1 tablet by mouth 2 (Two) Times a Day With Meals., Disp: 60 tablet, Rfl: 11  •  cholecalciferol (VITAMIN D3) 1000 UNITS tablet, Take 1,000 Units by mouth Daily., Disp: , Rfl:   •  CloNIDine (CATAPRES-TTS) 0.3 MG/24HR patch, Place 1 patch on the skin 1 (One) Time Per Week., Disp: 4 patch, Rfl: 11  •  famotidine (PEPCID) 20 MG tablet, Take 20 mg by mouth 2 (Two) Times a Day., Disp: , Rfl:   •  fexofenadine (ALLEGRA) 180 MG tablet, Take 180 mg by mouth Daily., Disp: , Rfl:   •  furosemide (LASIX) 20 MG tablet, Take 1 tablet by mouth 2 (Two) Times a Day., Disp: 180 tablet, Rfl: 1  •  levothyroxine (SYNTHROID, LEVOTHROID) 88 MCG tablet, Take 88 mcg by mouth daily.,  Disp: , Rfl:   •  metFORMIN (GLUCOPHAGE) 500 MG tablet, Take 500 mg by mouth 4 (Four) Times a Day., Disp: , Rfl:   •  Omega-3 Fatty Acids (FISH OIL) 1200 MG capsule capsule, Take 1,200 mg by mouth 2 (Two) Times a Day., Disp: , Rfl:   •  potassium chloride (KLOR-CON) 10 MEQ CR tablet, Take 1 tablet by mouth 2 (Two) Times a Day., Disp: 180 tablet, Rfl: 3  •  cefdinir (OMNICEF) 300 MG capsule, Take 2 daily, Disp: 20 capsule, Rfl: 0.      The patient's relevant past medical, surgical, family and social history were reviewed and updated in Epic as appropriate.     ROS:    Review of Systems   Constitutional: Negative for fatigue.   HENT: Positive for congestion.    Respiratory: Positive for apnea.          Objective:    Physical Exam   Constitutional: She is oriented to person, place, and time. She appears well-developed and well-nourished.   HENT:   Head: Normocephalic and atraumatic.   Mouth/Throat: Oropharynx is clear and moist.   Class I airway with decreased space   Neck: Neck supple. No thyromegaly present.   Cardiovascular: Normal rate and regular rhythm.  Exam reveals no gallop and no friction rub.    No murmur heard.  Pulmonary/Chest: Effort normal. No respiratory distress. She has no wheezes. She has no rales.   Abdominal: Soft. Bowel sounds are normal.   Musculoskeletal: She exhibits no edema.   Neurological: She is alert and oriented to person, place, and time.   Skin: Skin is warm and dry.   Psychiatric: She has a normal mood and affect. Her behavior is normal.   Vitals reviewed.        Assessment:    Problem List Items Addressed This Visit        Pulmonary Problems    Mild obstructive sleep apnea - Primary       Other    Hypertension        I'm fairly confident she has obstructive sleep apnea.  With this is mild or a more moderate form is unclear.  I went over the study in detail with her.  I explained to her the potential complications of untreated obstructive sleep apnea and my recommendations were to  treat it in some fashion.  She is opposed to CPAP therapy but is willing to try an oral appliance.  Her  is a dentist and is going to fabricate her an appliance.  I think that is reasonable given the mild to moderate nature of her sleep apnea.      Plan:     I will see her back in several months after she has had the appliance made and adjusted and see how she is faring.  There may not be much objective data to follow other than her subjective sense of her sleep quality and daytime functionality and, hopefully, a slight reduction in her blood pressure.  If that is the case then I would simply continue with the appliance if she is tolerating it.  If there is continued questions then we could consider CPAP therapy or an in lab polysomnogram at that time.    Discussed with patient in detail and answered all questions.      Signed by  Brian Raza MD

## 2017-03-29 ENCOUNTER — TRANSCRIBE ORDERS (OUTPATIENT)
Dept: FAMILY MEDICINE CLINIC | Facility: CLINIC | Age: 72
End: 2017-03-29

## 2017-03-29 DIAGNOSIS — Z12.31 VISIT FOR SCREENING MAMMOGRAM: Primary | ICD-10-CM

## 2017-04-25 ENCOUNTER — OFFICE VISIT (OUTPATIENT)
Dept: FAMILY MEDICINE CLINIC | Facility: CLINIC | Age: 72
End: 2017-04-25

## 2017-04-25 VITALS
DIASTOLIC BLOOD PRESSURE: 74 MMHG | RESPIRATION RATE: 16 BRPM | WEIGHT: 167.3 LBS | BODY MASS INDEX: 26.89 KG/M2 | HEIGHT: 66 IN | HEART RATE: 67 BPM | OXYGEN SATURATION: 97 % | SYSTOLIC BLOOD PRESSURE: 136 MMHG

## 2017-04-25 DIAGNOSIS — I10 ESSENTIAL HYPERTENSION: ICD-10-CM

## 2017-04-25 DIAGNOSIS — E11.9 TYPE 2 DIABETES MELLITUS WITHOUT COMPLICATION, WITHOUT LONG-TERM CURRENT USE OF INSULIN (HCC): Primary | ICD-10-CM

## 2017-04-25 DIAGNOSIS — E78.01 FAMILIAL HYPERCHOLESTEROLEMIA: ICD-10-CM

## 2017-04-25 DIAGNOSIS — E55.9 VITAMIN D DEFICIENCY: ICD-10-CM

## 2017-04-25 LAB
25(OH)D3 SERPL-MCNC: 34.9 NG/ML
ALBUMIN SERPL-MCNC: 4.9 G/DL (ref 3.2–4.8)
ALBUMIN/GLOB SERPL: 1.7 G/DL (ref 1.5–2.5)
ALP SERPL-CCNC: 41 U/L (ref 25–100)
ALT SERPL W P-5'-P-CCNC: 35 U/L (ref 7–40)
ANION GAP SERPL CALCULATED.3IONS-SCNC: 9 MMOL/L (ref 3–11)
ARTICHOKE IGE QN: 99 MG/DL (ref 0–130)
AST SERPL-CCNC: 28 U/L (ref 0–33)
BILIRUB SERPL-MCNC: 0.6 MG/DL (ref 0.3–1.2)
BUN BLD-MCNC: 19 MG/DL (ref 9–23)
BUN/CREAT SERPL: 21.1 (ref 7–25)
CALCIUM SPEC-SCNC: 10.7 MG/DL (ref 8.7–10.4)
CHLORIDE SERPL-SCNC: 104 MMOL/L (ref 99–109)
CHOLEST SERPL-MCNC: 163 MG/DL (ref 0–200)
CO2 SERPL-SCNC: 29 MMOL/L (ref 20–31)
CREAT BLD-MCNC: 0.9 MG/DL (ref 0.6–1.3)
GFR SERPL CREATININE-BSD FRML MDRD: 62 ML/MIN/1.73
GLOBULIN UR ELPH-MCNC: 2.9 GM/DL
GLUCOSE BLD-MCNC: 143 MG/DL (ref 70–100)
HBA1C MFR BLD: 6 %
HDLC SERPL-MCNC: 42 MG/DL (ref 40–60)
POTASSIUM BLD-SCNC: 4.5 MMOL/L (ref 3.5–5.5)
PROT SERPL-MCNC: 7.8 G/DL (ref 5.7–8.2)
SODIUM BLD-SCNC: 142 MMOL/L (ref 132–146)
TRIGL SERPL-MCNC: 154 MG/DL (ref 0–150)

## 2017-04-25 PROCEDURE — 83036 HEMOGLOBIN GLYCOSYLATED A1C: CPT | Performed by: FAMILY MEDICINE

## 2017-04-25 PROCEDURE — 82306 VITAMIN D 25 HYDROXY: CPT | Performed by: FAMILY MEDICINE

## 2017-04-25 PROCEDURE — 36415 COLL VENOUS BLD VENIPUNCTURE: CPT | Performed by: FAMILY MEDICINE

## 2017-04-25 PROCEDURE — 80053 COMPREHEN METABOLIC PANEL: CPT | Performed by: FAMILY MEDICINE

## 2017-04-25 PROCEDURE — 99214 OFFICE O/P EST MOD 30 MIN: CPT | Performed by: FAMILY MEDICINE

## 2017-04-25 PROCEDURE — 80061 LIPID PANEL: CPT | Performed by: FAMILY MEDICINE

## 2017-04-25 RX ORDER — BACLOFEN 10 MG/1
TABLET ORAL
Qty: 30 TABLET | Refills: 0 | OUTPATIENT
Start: 2017-04-25

## 2017-04-25 NOTE — PROGRESS NOTES
Subjective   Leila Pierre is a 72 y.o. female.     Diabetes   She presents for her follow-up diabetic visit. She has type 2 diabetes mellitus. Her disease course has been stable. Pertinent negatives for hypoglycemia include no dizziness or headaches. Pertinent negatives for diabetes include no chest pain, no fatigue, no polydipsia, no polyphagia, no polyuria, no weakness and no weight loss. There are no hypoglycemic complications. Symptoms are stable. There are no diabetic complications. Risk factors for coronary artery disease include diabetes mellitus and post-menopausal. Current diabetic treatment includes oral agent (monotherapy) and diet. She is compliant with treatment most of the time. An ACE inhibitor/angiotensin II receptor blocker is being taken. Eye exam is current.   Hypertension   This is a chronic problem. The problem is resistant. Associated symptoms include peripheral edema. Pertinent negatives include no chest pain, headaches, palpitations or shortness of breath. The current treatment provides moderate improvement. There are no compliance problems.  There is no history of angina, kidney disease or CAD/MI.   Back Pain   This is a recurrent problem. The current episode started more than 1 month ago. The problem occurs daily. The problem has been waxing and waning since onset. The pain is present in the thoracic spine. The quality of the pain is described as aching. The pain does not radiate. The pain is at a severity of 5/10. The pain is worse during the day. The symptoms are aggravated by standing. Pertinent negatives include no abdominal pain, bladder incontinence, bowel incontinence, chest pain, dysuria, fever, headaches, leg pain, numbness, paresis, paresthesias, pelvic pain, perianal numbness, tingling, weakness or weight loss. Risk factors include history of cancer, lack of exercise, menopause and obesity.        The following portions of the patient's history were reviewed and updated as  "appropriate: allergies, current medications, past social history and problem list.    Review of Systems   Constitutional: Negative for appetite change, diaphoresis, fatigue, fever, unexpected weight change and weight loss.   Eyes: Negative for visual disturbance.   Respiratory: Negative for chest tightness and shortness of breath.    Cardiovascular: Negative for chest pain, palpitations and leg swelling.   Gastrointestinal: Negative for abdominal pain, bowel incontinence, diarrhea, nausea and vomiting.   Endocrine: Negative for polydipsia, polyphagia and polyuria.   Genitourinary: Negative for bladder incontinence, dysuria and pelvic pain.   Musculoskeletal: Positive for back pain.   Skin: Negative for color change.   Neurological: Negative for dizziness, tingling, weakness, light-headedness, numbness, headaches and paresthesias.       Objective   /74  Pulse 67  Resp 16  Ht 66\" (167.6 cm)  Wt 167 lb 4.8 oz (75.9 kg)  SpO2 97%  BMI 27 kg/m2  Physical Exam   Constitutional: She is oriented to person, place, and time. She appears well-developed and well-nourished. She is cooperative.   HENT:   Head: Normocephalic.   Right Ear: External ear normal.   Left Ear: External ear normal.   Nose: Nose normal.   Mouth/Throat: Oropharynx is clear and moist.   Eyes: Conjunctivae are normal. Pupils are equal, round, and reactive to light. No scleral icterus.   Neck: Neck supple. Carotid bruit is not present. No thyromegaly present.   Cardiovascular: Normal rate and regular rhythm.    Pulmonary/Chest: Effort normal and breath sounds normal.   Abdominal: There is no hepatosplenomegaly.   Musculoskeletal: Normal range of motion.   Neurological: She is alert and oriented to person, place, and time.   No focal deficits no lateralizing signs   Skin: Skin is warm and dry. No rash noted.   Psychiatric: She has a normal mood and affect. Cognition and memory are normal.   Nursing note and vitals reviewed.      Assessment/Plan "   Problem List Items Addressed This Visit        Cardiovascular and Mediastinum    Hyperlipidemia    Relevant Orders    Comprehensive Metabolic Panel (Completed)    Lipid Panel (Completed)    Hypertension       Digestive    Vitamin D deficiency    Relevant Orders    Vitamin D 25 Hydroxy (Completed)       Endocrine    Diabetes mellitus - Primary    Relevant Orders    POC Glycosylated Hemoglobin (Hb A1C) (Completed)    Comprehensive Metabolic Panel (Completed)          No orders of the defined types were placed in this encounter.    Continue current diabetic medication and diet as A1c is stable and in the good range. Keep follow-up with cardiology regarding blood pressure treatments. Consider physical therapy back pain persist.

## 2017-05-16 ENCOUNTER — OFFICE VISIT (OUTPATIENT)
Dept: OBSTETRICS AND GYNECOLOGY | Facility: CLINIC | Age: 72
End: 2017-05-16

## 2017-05-16 VITALS
RESPIRATION RATE: 14 BRPM | OXYGEN SATURATION: 98 % | DIASTOLIC BLOOD PRESSURE: 82 MMHG | BODY MASS INDEX: 28.36 KG/M2 | SYSTOLIC BLOOD PRESSURE: 120 MMHG | HEART RATE: 66 BPM | HEIGHT: 65 IN | WEIGHT: 170.2 LBS

## 2017-05-16 DIAGNOSIS — Z01.419 WELL WOMAN EXAM WITH ROUTINE GYNECOLOGICAL EXAM: Primary | ICD-10-CM

## 2017-05-16 PROCEDURE — 99397 PER PM REEVAL EST PAT 65+ YR: CPT | Performed by: OBSTETRICS & GYNECOLOGY

## 2017-05-18 PROBLEM — Z01.419 WELL WOMAN EXAM WITH ROUTINE GYNECOLOGICAL EXAM: Status: ACTIVE | Noted: 2017-05-18

## 2017-05-19 RX ORDER — BACLOFEN 10 MG/1
10 TABLET ORAL 3 TIMES DAILY
Qty: 90 TABLET | Refills: 1 | Status: SHIPPED | OUTPATIENT
Start: 2017-05-19 | End: 2018-01-23 | Stop reason: SDUPTHER

## 2017-05-22 DIAGNOSIS — Z01.419 WELL WOMAN EXAM WITH ROUTINE GYNECOLOGICAL EXAM: ICD-10-CM

## 2017-06-02 ENCOUNTER — TELEPHONE (OUTPATIENT)
Dept: OBSTETRICS AND GYNECOLOGY | Facility: CLINIC | Age: 72
End: 2017-06-02

## 2017-06-27 ENCOUNTER — OFFICE VISIT (OUTPATIENT)
Dept: OBSTETRICS AND GYNECOLOGY | Facility: CLINIC | Age: 72
End: 2017-06-27

## 2017-06-27 ENCOUNTER — OFFICE VISIT (OUTPATIENT)
Dept: CARDIOLOGY | Facility: CLINIC | Age: 72
End: 2017-06-27

## 2017-06-27 VITALS
BODY MASS INDEX: 28.32 KG/M2 | OXYGEN SATURATION: 98 % | DIASTOLIC BLOOD PRESSURE: 76 MMHG | WEIGHT: 170 LBS | HEIGHT: 65 IN | HEART RATE: 69 BPM | SYSTOLIC BLOOD PRESSURE: 120 MMHG | RESPIRATION RATE: 14 BRPM

## 2017-06-27 VITALS
HEART RATE: 68 BPM | DIASTOLIC BLOOD PRESSURE: 81 MMHG | HEIGHT: 65 IN | WEIGHT: 169.6 LBS | BODY MASS INDEX: 28.26 KG/M2 | SYSTOLIC BLOOD PRESSURE: 138 MMHG

## 2017-06-27 DIAGNOSIS — I10 ESSENTIAL HYPERTENSION: Primary | ICD-10-CM

## 2017-06-27 DIAGNOSIS — R87.615 UNSATISFACTORY CYTOLOGIC SMEAR OF CERVIX: Primary | ICD-10-CM

## 2017-06-27 PROCEDURE — 99213 OFFICE O/P EST LOW 20 MIN: CPT | Performed by: INTERNAL MEDICINE

## 2017-06-27 PROCEDURE — 99212-NC PR NO CHARGE CBC OFFICE OUTPATIENT VISIT 10 MINUTES: Performed by: OBSTETRICS & GYNECOLOGY

## 2017-06-27 RX ORDER — ROSUVASTATIN CALCIUM 5 MG/1
5 TABLET, COATED ORAL DAILY
COMMUNITY
Start: 2017-05-18 | End: 2019-03-14

## 2017-06-27 RX ORDER — AMLODIPINE BESYLATE 5 MG/1
5 TABLET ORAL DAILY
Qty: 90 TABLET | Refills: 3 | Status: SHIPPED | OUTPATIENT
Start: 2017-06-27 | End: 2017-08-14 | Stop reason: SDUPTHER

## 2017-06-27 NOTE — PROGRESS NOTES
"OFFICE FOLLOW UP     06/27/2017     Leila Pierre  [address] [place] [address]  [phone]    1945    Ike Cifuentes MD    Leila Pierre is a 72 y.o. female.    Chief Complaint: Edema (LEGS/FEET/ANKLES)    PROBLEM LIST:  1. HTN, uncontrolled recently  a. Onset 40 years ago  b. Renal artery duplex normal with no evidence of PAMELA (10/17/2016)  c. Negative pheo evaluation  2. Dyslipidemia, with multiple statin intolerances  3. DM2  4. Hypothyroidism  5. Obstructive Sleep Apnea  a. Using oral appliance, \"snore guard\"    Allergies   Allergen Reactions   • Clarithromycin    • Decongest-Aid [Pseudoephedrine]      Gives too much energy and patient is unable to sleep   • Irbesartan    • Lescol [Fluvastatin Sodium] Diarrhea   • Lipitor [Atorvastatin] Myalgia   • Other      bandaids    • Sulfa Antibiotics    • Valsartan          Current Outpatient Prescriptions:   •  amLODIPine (NORVASC) 10 MG tablet, Take 10 mg by mouth Daily., Disp: , Rfl:   •  aspirin 81 MG EC tablet, Take 81 mg by mouth Daily., Disp: , Rfl:   •  azilsartan medoxomil (EDARBI) 80 MG tablet tablet, Take 1 tablet by mouth Daily., Disp: 30 tablet, Rfl: 11  •  baclofen (LIORESAL) 10 MG tablet, Take 1 tablet by mouth 3 (Three) Times a Day., Disp: 90 tablet, Rfl: 1  •  BIOTIN 5000 PO, Take 1 tablet by mouth Daily., Disp: , Rfl:   •  Calcium Carbonate (CALCIUM 600 PO), Take 1 tablet by mouth Daily., Disp: , Rfl:   •  carvedilol (COREG) 25 MG tablet, Take 1 tablet by mouth 2 (Two) Times a Day With Meals., Disp: 60 tablet, Rfl: 11  •  cholecalciferol (VITAMIN D3) 1000 UNITS tablet, Take 1,000 Units by mouth Daily., Disp: , Rfl:   •  CloNIDine (CATAPRES-TTS) 0.2 MG/24HR patch, Place 1 patch on the skin 1 (One) Time Per Week., Disp: 4 patch, Rfl: 11  •  famotidine (PEPCID) 20 MG tablet, Take 20 mg by mouth 2 (Two) Times a Day., Disp: , Rfl:   •  fexofenadine (ALLEGRA) 180 MG tablet, Take 180 mg by mouth Daily., Disp: , Rfl:   •  furosemide " "(LASIX) 20 MG tablet, Take 1 tablet by mouth 2 (Two) Times a Day., Disp: 180 tablet, Rfl: 1  •  levothyroxine (SYNTHROID, LEVOTHROID) 88 MCG tablet, Take 88 mcg by mouth daily., Disp: , Rfl:   •  metFORMIN (GLUCOPHAGE) 500 MG tablet, Take 500 mg by mouth 4 (Four) Times a Day., Disp: , Rfl:   •  Omega-3 Fatty Acids (FISH OIL) 1200 MG capsule capsule, Take 1,200 mg by mouth 2 (Two) Times a Day., Disp: , Rfl:   •  potassium chloride (KLOR-CON) 10 MEQ CR tablet, Take 1 tablet by mouth 2 (Two) Times a Day., Disp: 180 tablet, Rfl: 3  •  rosuvastatin (CRESTOR) 5 MG tablet, , Disp: , Rfl:     History of Present Illness:    Leila Pierre is a 72 y.o. female returning today for a follow-up, with complaints of edema in the lower extremities (legs, feet, ankles). Generally, the patient feels well from a cardiac standpoint, except for her main complaint of edema. She states that by the end of the day, her leg swelling is so intense that a rash occurs which has begun to leave some scarring. She understands that this is most likely a side effect of her Amlodipine medication. The patient reports that her blood pressure is somewhat high when she measures it at home, but it goes down after she takes this medication, so she understands the importance of staying on it. She also mentions some normal weight loss. Home BPs are at target.    The following portions of the patient's history were reviewed and updated as appropriate: allergies, current medications and problem list.    Pertinent positives as listed in the HPI.  All other systems reviewed and negative.    Objective:     Vitals:    06/27/17 1229 06/27/17 1230   BP: 143/80 138/81   BP Location: Left arm Left arm   Patient Position: Sitting Standing   Pulse: 68 68   Weight: 169 lb 9.6 oz (76.9 kg)    Height: 65\" (165.1 cm)        Physical Exam   Constitutional: She is oriented to person, place, and time.   Neck: No JVD present. No thyromegaly present.   Cardiovascular: Normal " "rate and regular rhythm.    Exam reveals no gallop, murmur, or rub.   Pulmonary/Chest:   No wheezes, crackles, or rhonchi.   Musculoskeletal:   +3 LE edema peripheral edema, no clubbing, or cyanosis    Neurological: She is alert and oriented to person, place, and time.   No focal abnormalities in sensation or strength    Vitals reviewed.      Diagnostic Data:      Lab Results   Component Value Date    GLUCOSE 143 (H) 2017    BUN 19 2017    CREATININE 0.90 2017    EGFRIFNONA 62 2017    EGFRIFAFRI 77 2015    BCR 21.1 2017    K 4.5 2017    CO2 29.0 2017    CALCIUM 10.7 (H) 2017    PROTENTOTREF 6.9 2015    ALBUMIN 4.90 (H) 2017    LABIL2 1.7 2017    AST 28 2017    ALT 35 2017     Total: 163  Tri  HDL: 42  LDL: 99  HGB A1C: 6    Procedures    Assessment and Plan:   The patient's blood pressures are \"at target\" both at home and in this office.  Her lower extremity edema is mildly to moderately symptomatic and due to amlodipine.  We will therefore continue current medications and recommendations except for a decrease in amlodipine dosage from 10-5 mg by mouth daily while monitoring blood pressure changes.  If lower extremity symptoms do not improve or worsen at all her blood pressure increases above target she will call us.  She will otherwise return in 6 months Uziel Meza.    Scribed for Uziel Meza MD by Nidia Owusu. 2017  2:22 PM    I, Uziel Meza MD, [facility], personally performed the services described in this documentation as scribed by the above named individual in my presence, and it is both accurate and complete. At 1:11 PM on 2017    EMR Dragon/Transcription disclaimer:   Much of this encounter note is an electronic transcription/translation of spoken language to printed text. The electronic translation of spoken language may permit erroneous, or at times, nonsensical words or phrases to be " inadvertently transcribed; Although I have reviewed the note for such errors, some may still exist.

## 2017-07-10 NOTE — PROGRESS NOTES
"Subjective   Chief Complaint   Patient presents with   • Follow-up     Repeat pap prior pap non-diagnostic     Leila Pierre is a 72 y.o. year old  presenting to be seen for a PAP in follow-up of a prior insufficient PAP and/or HPV screen.    Current birth control method: post menopausal.    OTHER COMPLAINTS:  Nothing else     The following portions of the patient's history were reviewed and updated as appropriate:problem list, current medications, allergies, past family history, past medical history, past social history and past surgical history.    Smoking status: Never Smoker                                                              Smokeless status: Never Used                        Review of Systems  Constitutional POS: nothing reported    NEG: anorexia or night sweats   Gastointestinal POS: nothing reported    NEG: bloating, change in bowel habits, melena or reflux symptoms   Genitourinary POS: nothing reported    NEG: dysuria or hematuria   Integument POS: nothing reported    NEG: moles that are changing in size, shape, color or rashes   Breast POS: nothing reported    NEG: persistent breast lump, skin dimpling or nipple discharge        Objective   /76  Pulse 69  Resp 14  Ht 64.5\" (163.8 cm)  Wt 170 lb (77.1 kg)  LMP  (LMP Unknown)  SpO2 98%  Breastfeeding? No  BMI 28.73 kg/m2    General:  well developed; well nourished  no acute distress   Pelvis: Clinical staff was present for exam  Cervix:  normal appearance. pap collected     Lab Review   No data reviewed    Imaging   No data reviewed       Assessment   1. Repeat pap     Plan   1. Await pap result for plan of care  2. Follow up for annual exam 1 year    New Medications Ordered This Visit   Medications   • rosuvastatin (CRESTOR) 5 MG tablet          This note was electronically signed.    Vickey Millard MD [facility]  2017  "

## 2017-07-25 ENCOUNTER — OFFICE VISIT (OUTPATIENT)
Dept: FAMILY MEDICINE CLINIC | Facility: CLINIC | Age: 72
End: 2017-07-25

## 2017-07-25 VITALS
SYSTOLIC BLOOD PRESSURE: 128 MMHG | DIASTOLIC BLOOD PRESSURE: 76 MMHG | WEIGHT: 170.8 LBS | HEIGHT: 65 IN | HEART RATE: 58 BPM | RESPIRATION RATE: 16 BRPM | BODY MASS INDEX: 28.46 KG/M2 | OXYGEN SATURATION: 98 %

## 2017-07-25 DIAGNOSIS — E11.9 TYPE 2 DIABETES MELLITUS WITHOUT COMPLICATION, WITHOUT LONG-TERM CURRENT USE OF INSULIN (HCC): Primary | ICD-10-CM

## 2017-07-25 DIAGNOSIS — E78.01 FAMILIAL HYPERCHOLESTEROLEMIA: ICD-10-CM

## 2017-07-25 DIAGNOSIS — I10 ESSENTIAL HYPERTENSION: ICD-10-CM

## 2017-07-25 LAB — HBA1C MFR BLD: 5.8 %

## 2017-07-25 PROCEDURE — 83036 HEMOGLOBIN GLYCOSYLATED A1C: CPT | Performed by: FAMILY MEDICINE

## 2017-07-25 PROCEDURE — 99214 OFFICE O/P EST MOD 30 MIN: CPT | Performed by: FAMILY MEDICINE

## 2017-07-25 RX ORDER — FUROSEMIDE 20 MG/1
20 TABLET ORAL 2 TIMES DAILY
Qty: 180 TABLET | Refills: 1 | Status: SHIPPED | OUTPATIENT
Start: 2017-07-25 | End: 2017-12-29 | Stop reason: SDUPTHER

## 2017-07-25 NOTE — PROGRESS NOTES
Subjective   Leila Pierre is a 72 y.o. female.     Hyperlipidemia   This is a chronic problem. The problem is controlled. Exacerbating diseases include diabetes. There are no known factors aggravating her hyperlipidemia. Pertinent negatives include no myalgias or shortness of breath. Current antihyperlipidemic treatment includes statins (takes less tahn daily). The current treatment provides significant improvement of lipids. Risk factors for coronary artery disease include hypertension, post-menopausal, a sedentary lifestyle and diabetes mellitus.   Hypertension   This is a chronic problem. The problem is resistant. Pertinent negatives include no palpitations, peripheral edema or shortness of breath. Risk factors for coronary artery disease include post-menopausal state, sedentary lifestyle and stress. Past treatments include central alpha agonists, calcium channel blockers, angiotensin blockers, beta blockers and diuretics. The current treatment provides moderate improvement. There are no compliance problems.  There is no history of angina, kidney disease, CAD/MI or left ventricular hypertrophy.   Diabetes   She presents for her follow-up diabetic visit. She has type 2 diabetes mellitus. Her disease course has been improving. Pertinent negatives for hypoglycemia include no dizziness. Pertinent negatives for diabetes include no fatigue, no polydipsia, no polyphagia, no polyuria and no visual change. There are no hypoglycemic complications. Symptoms are stable. There are no diabetic complications. Current diabetic treatment includes oral agent (monotherapy). She is compliant with treatment most of the time. An ACE inhibitor/angiotensin II receptor blocker is being taken. Eye exam is current.        The following portions of the patient's history were reviewed and updated as appropriate: allergies, current medications, past social history and problem list.    Review of Systems   Constitutional: Negative for  "appetite change, diaphoresis, fatigue and unexpected weight change.   Eyes: Negative for visual disturbance.   Respiratory: Negative for cough, chest tightness and shortness of breath.    Cardiovascular: Negative for palpitations and leg swelling.   Gastrointestinal: Negative for diarrhea, nausea and vomiting.   Endocrine: Negative for polydipsia, polyphagia and polyuria.   Genitourinary: Negative for hematuria.   Musculoskeletal: Negative for myalgias.   Skin: Negative for color change and rash.   Neurological: Negative for dizziness, syncope and light-headedness.       Objective   /76  Pulse 58  Resp 16  Ht 65\" (165.1 cm)  Wt 170 lb 12.8 oz (77.5 kg)  LMP  (LMP Unknown)  SpO2 98%  BMI 28.42 kg/m2  Physical Exam   Constitutional: She is oriented to person, place, and time. She appears well-developed and well-nourished. She is cooperative.   HENT:   Head: Normocephalic.   Right Ear: External ear normal.   Left Ear: External ear normal.   Nose: Nose normal.   Mouth/Throat: Oropharynx is clear and moist.   Eyes: Conjunctivae are normal. Pupils are equal, round, and reactive to light. No scleral icterus.   Neck: Neck supple. Carotid bruit is not present. No thyromegaly present.   Cardiovascular: Normal rate, regular rhythm and normal heart sounds.    Pulmonary/Chest: Effort normal and breath sounds normal.   Abdominal: There is no hepatosplenomegaly.   Musculoskeletal: Normal range of motion.   Trace to 1+ edema improved   Neurological: She is alert and oriented to person, place, and time.   No focal deficits no lateralizing signs   Skin: Skin is warm and dry. No rash noted.   Psychiatric: She has a normal mood and affect. Cognition and memory are normal.   Nursing note and vitals reviewed.      Assessment/Plan   Problem List Items Addressed This Visit        Cardiovascular and Mediastinum    Hyperlipidemia    Hypertension    Relevant Medications    furosemide (LASIX) 20 MG tablet       Endocrine    " Diabetes mellitus - Primary    Relevant Medications    metFORMIN (GLUCOPHAGE) 500 MG tablet    Other Relevant Orders    POC Glycosylated Hemoglobin (Hb A1C) (Completed)          New Medications Ordered This Visit   Medications   • furosemide (LASIX) 20 MG tablet     Sig: Take 1 tablet by mouth 2 (Two) Times a Day.     Dispense:  180 tablet     Refill:  1   • metFORMIN (GLUCOPHAGE) 500 MG tablet     Sig: Take 2 tablets by mouth 2 (Two) Times a Day With Meals.     Dispense:  360 tablet     Refill:  1   I have encouraged continued exercise and dietary compliance, blood pressure and diabetic control are improved.

## 2017-08-01 ENCOUNTER — HOSPITAL ENCOUNTER (OUTPATIENT)
Dept: MAMMOGRAPHY | Facility: HOSPITAL | Age: 72
Discharge: HOME | End: 2017-08-01
Attending: FAMILY MEDICINE | Admitting: FAMILY MEDICINE

## 2017-08-01 DIAGNOSIS — Z12.31 VISIT FOR SCREENING MAMMOGRAM: ICD-10-CM

## 2017-08-01 PROCEDURE — 77063 BREAST TOMOSYNTHESIS BI: CPT

## 2017-08-01 PROCEDURE — 77063 BREAST TOMOSYNTHESIS BI: CPT | Performed by: RADIOLOGY

## 2017-08-01 PROCEDURE — G0202 SCR MAMMO BI INCL CAD: HCPCS | Performed by: RADIOLOGY

## 2017-08-01 PROCEDURE — G0202 SCR MAMMO BI INCL CAD: HCPCS

## 2017-08-14 RX ORDER — AMLODIPINE BESYLATE 5 MG/1
5 TABLET ORAL 2 TIMES DAILY
Qty: 180 TABLET | Refills: 3 | Status: SHIPPED | OUTPATIENT
Start: 2017-08-14 | End: 2018-08-11 | Stop reason: SDUPTHER

## 2017-08-14 NOTE — TELEPHONE ENCOUNTER
Pt has had to go back to 10 mg daily however has increased LE edema and would like to try 5 mg BID. OK per [facility]. Rx sent. Pt aware. KH

## 2017-10-04 ENCOUNTER — FLU SHOT (OUTPATIENT)
Dept: FAMILY MEDICINE CLINIC | Facility: CLINIC | Age: 72
End: 2017-10-04

## 2017-10-04 PROCEDURE — 90662 IIV NO PRSV INCREASED AG IM: CPT | Performed by: FAMILY MEDICINE

## 2017-10-04 PROCEDURE — G0008 ADMIN INFLUENZA VIRUS VAC: HCPCS | Performed by: FAMILY MEDICINE

## 2017-10-24 ENCOUNTER — OFFICE VISIT (OUTPATIENT)
Dept: FAMILY MEDICINE CLINIC | Facility: CLINIC | Age: 72
End: 2017-10-24

## 2017-10-24 VITALS
WEIGHT: 170.4 LBS | TEMPERATURE: 98.3 F | SYSTOLIC BLOOD PRESSURE: 134 MMHG | DIASTOLIC BLOOD PRESSURE: 78 MMHG | HEART RATE: 64 BPM | HEIGHT: 65 IN | OXYGEN SATURATION: 98 % | BODY MASS INDEX: 28.39 KG/M2 | RESPIRATION RATE: 16 BRPM

## 2017-10-24 DIAGNOSIS — E11.9 TYPE 2 DIABETES MELLITUS WITHOUT COMPLICATION, WITHOUT LONG-TERM CURRENT USE OF INSULIN (HCC): ICD-10-CM

## 2017-10-24 DIAGNOSIS — Z78.0 MENOPAUSE: Primary | ICD-10-CM

## 2017-10-24 DIAGNOSIS — Z00.00 MEDICARE ANNUAL WELLNESS VISIT, SUBSEQUENT: Primary | ICD-10-CM

## 2017-10-24 DIAGNOSIS — E55.9 VITAMIN D DEFICIENCY: ICD-10-CM

## 2017-10-24 DIAGNOSIS — I10 ESSENTIAL HYPERTENSION: ICD-10-CM

## 2017-10-24 DIAGNOSIS — E78.01 FAMILIAL HYPERCHOLESTEROLEMIA: ICD-10-CM

## 2017-10-24 PROBLEM — Z01.419 WELL WOMAN EXAM WITH ROUTINE GYNECOLOGICAL EXAM: Status: RESOLVED | Noted: 2017-05-18 | Resolved: 2017-10-24

## 2017-10-24 LAB
25(OH)D3 SERPL-MCNC: 34.4 NG/ML
ALBUMIN SERPL-MCNC: 4.9 G/DL (ref 3.2–4.8)
ALBUMIN/GLOB SERPL: 1.7 G/DL (ref 1.5–2.5)
ALP SERPL-CCNC: 56 U/L (ref 25–100)
ALT SERPL W P-5'-P-CCNC: 33 U/L (ref 7–40)
ANION GAP SERPL CALCULATED.3IONS-SCNC: 6 MMOL/L (ref 3–11)
ARTICHOKE IGE QN: 175 MG/DL (ref 0–130)
AST SERPL-CCNC: 24 U/L (ref 0–33)
BASOPHILS # BLD AUTO: 0.02 10*3/MM3 (ref 0–0.2)
BASOPHILS NFR BLD AUTO: 0.3 % (ref 0–1)
BILIRUB BLD-MCNC: NEGATIVE MG/DL
BILIRUB SERPL-MCNC: 0.6 MG/DL (ref 0.3–1.2)
BUN BLD-MCNC: 16 MG/DL (ref 9–23)
BUN/CREAT SERPL: 20 (ref 7–25)
CALCIUM SPEC-SCNC: 10 MG/DL (ref 8.7–10.4)
CHLORIDE SERPL-SCNC: 104 MMOL/L (ref 99–109)
CHOLEST SERPL-MCNC: 231 MG/DL (ref 0–200)
CLARITY, POC: CLEAR
CO2 SERPL-SCNC: 28 MMOL/L (ref 20–31)
COLOR UR: YELLOW
CREAT BLD-MCNC: 0.8 MG/DL (ref 0.6–1.3)
DEPRECATED RDW RBC AUTO: 48 FL (ref 37–54)
EOSINOPHIL # BLD AUTO: 0.16 10*3/MM3 (ref 0–0.3)
EOSINOPHIL NFR BLD AUTO: 2.3 % (ref 0–3)
ERYTHROCYTE [DISTWIDTH] IN BLOOD BY AUTOMATED COUNT: 14 % (ref 11.3–14.5)
GFR SERPL CREATININE-BSD FRML MDRD: 71 ML/MIN/1.73
GLOBULIN UR ELPH-MCNC: 2.9 GM/DL
GLUCOSE BLD-MCNC: 120 MG/DL (ref 70–100)
GLUCOSE UR STRIP-MCNC: NEGATIVE MG/DL
HBA1C MFR BLD: 6 %
HCT VFR BLD AUTO: 44.7 % (ref 34.5–44)
HDLC SERPL-MCNC: 45 MG/DL (ref 40–60)
HGB BLD-MCNC: 14.7 G/DL (ref 11.5–15.5)
IMM GRANULOCYTES # BLD: 0.01 10*3/MM3 (ref 0–0.03)
IMM GRANULOCYTES NFR BLD: 0.1 % (ref 0–0.6)
KETONES UR QL: NEGATIVE
LEUKOCYTE EST, POC: NEGATIVE
LYMPHOCYTES # BLD AUTO: 2.24 10*3/MM3 (ref 0.6–4.8)
LYMPHOCYTES NFR BLD AUTO: 31.8 % (ref 24–44)
MCH RBC QN AUTO: 30.8 PG (ref 27–31)
MCHC RBC AUTO-ENTMCNC: 32.9 G/DL (ref 32–36)
MCV RBC AUTO: 93.5 FL (ref 80–99)
MONOCYTES # BLD AUTO: 0.7 10*3/MM3 (ref 0–1)
MONOCYTES NFR BLD AUTO: 9.9 % (ref 0–12)
NEUTROPHILS # BLD AUTO: 3.92 10*3/MM3 (ref 1.5–8.3)
NEUTROPHILS NFR BLD AUTO: 55.6 % (ref 41–71)
NITRITE UR-MCNC: NEGATIVE MG/ML
PH UR: 5 [PH] (ref 5–8)
PLATELET # BLD AUTO: 212 10*3/MM3 (ref 150–450)
PMV BLD AUTO: 13.1 FL (ref 6–12)
POC CREATININE URINE: NORMAL
POC MICROALBUMIN URINE: NORMAL
POTASSIUM BLD-SCNC: 4.4 MMOL/L (ref 3.5–5.5)
PROT SERPL-MCNC: 7.8 G/DL (ref 5.7–8.2)
PROT UR STRIP-MCNC: NEGATIVE MG/DL
RBC # BLD AUTO: 4.78 10*6/MM3 (ref 3.89–5.14)
RBC # UR STRIP: NEGATIVE /UL
SODIUM BLD-SCNC: 138 MMOL/L (ref 132–146)
SP GR UR: 1.01 (ref 1–1.03)
TRIGL SERPL-MCNC: 200 MG/DL (ref 0–150)
TSH SERPL DL<=0.05 MIU/L-ACNC: 2.86 MIU/ML (ref 0.35–5.35)
UROBILINOGEN UR QL: NORMAL
WBC NRBC COR # BLD: 7.05 10*3/MM3 (ref 3.5–10.8)

## 2017-10-24 PROCEDURE — 82306 VITAMIN D 25 HYDROXY: CPT | Performed by: FAMILY MEDICINE

## 2017-10-24 PROCEDURE — 81003 URINALYSIS AUTO W/O SCOPE: CPT | Performed by: FAMILY MEDICINE

## 2017-10-24 PROCEDURE — 93000 ELECTROCARDIOGRAM COMPLETE: CPT | Performed by: FAMILY MEDICINE

## 2017-10-24 PROCEDURE — 83036 HEMOGLOBIN GLYCOSYLATED A1C: CPT | Performed by: FAMILY MEDICINE

## 2017-10-24 PROCEDURE — 80061 LIPID PANEL: CPT | Performed by: FAMILY MEDICINE

## 2017-10-24 PROCEDURE — G0439 PPPS, SUBSEQ VISIT: HCPCS | Performed by: FAMILY MEDICINE

## 2017-10-24 PROCEDURE — 85025 COMPLETE CBC W/AUTO DIFF WBC: CPT | Performed by: FAMILY MEDICINE

## 2017-10-24 PROCEDURE — 80053 COMPREHEN METABOLIC PANEL: CPT | Performed by: FAMILY MEDICINE

## 2017-10-24 PROCEDURE — 82044 UR ALBUMIN SEMIQUANTITATIVE: CPT | Performed by: FAMILY MEDICINE

## 2017-10-24 PROCEDURE — 36415 COLL VENOUS BLD VENIPUNCTURE: CPT | Performed by: FAMILY MEDICINE

## 2017-10-24 PROCEDURE — 84443 ASSAY THYROID STIM HORMONE: CPT | Performed by: FAMILY MEDICINE

## 2017-10-24 NOTE — PROGRESS NOTES
QUICK REFERENCE INFORMATION:  The ABCs of the Annual Wellness Visit    Subsequent Medicare Wellness Visit    HEALTH RISK ASSESSMENT    1945    Recent Hospitalizations:  No hospitalization(s) within the last year..        Current Medical Providers:  Patient Care Team:  Ike Cifuentes MD as PCP - General  Ike Cifuentes MD as PCP - Family Medicine        Smoking Status:  History   Smoking Status   • Never Smoker   Smokeless Tobacco   • Never Used       Alcohol Consumption:  History   Alcohol Use   • 0.6 - 1.2 oz/week   • 1 - 2 Glasses of wine per week     Comment: occasional wine       Depression Screen:   No flowsheet data found.    Health Habits and Functional and Cognitive Screening:  No flowsheet data found.        Does the patient have evidence of cognitive impairment? No    Aspirin use counseling: Taking ASA appropriately as indicated      Recent Lab Results:  CMP:  Lab Results   Component Value Date     (H) 02/26/2015    BUN 16 10/24/2017    CREATININE 0.80 10/24/2017    EGFRIFNONA 71 10/24/2017    EGFRIFAFRI 77 02/26/2015    BCR 20.0 10/24/2017     10/24/2017    K 4.4 10/24/2017    CO2 28.0 10/24/2017    CALCIUM 10.0 10/24/2017    PROTENTOTREF 6.9 02/26/2015    ALBUMIN 4.90 (H) 10/24/2017    LABGLOBREF 2.0 02/26/2015    LABIL2 1.7 10/24/2017    BILITOT 0.6 10/24/2017    ALKPHOS 56 10/24/2017    AST 24 10/24/2017    ALT 33 10/24/2017     Lipid Panel:  Lab Results   Component Value Date    CHOL 231 (H) 10/24/2017    TRIG 200 (H) 10/24/2017    HDL 45 10/24/2017    VLDL 40 02/26/2015    LDLDIRECT 175 (H) 10/24/2017     HbA1c:  Lab Results   Component Value Date    HGBA1C 6.0 10/24/2017       Visual Acuity:  No exam data present    Age-appropriate Screening Schedule:  Refer to the list below for future screening recommendations based on patient's age, sex and/or medical conditions. Orders for these recommended tests are listed in the plan section. The patient has been provided with a written  plan.    Health Maintenance   Topic Date Due   • COLONOSCOPY  06/07/2016   • URINE MICROALBUMIN  07/26/2016   • TDAP/TD VACCINES (2 - Td) 08/15/2016   • DIABETIC EYE EXAM  11/01/2017   • HEMOGLOBIN A1C  01/25/2018   • LIPID PANEL  04/25/2018   • DIABETIC FOOT EXAM  10/24/2018   • MAMMOGRAM  08/01/2019   • INFLUENZA VACCINE  Completed   • PNEUMOCOCCAL VACCINES (65+ LOW/MEDIUM RISK)  Completed   • ZOSTER VACCINE  Completed        Subjective   History of Present Illness    Leila Pierre is a 72 y.o. female who presents for an Subsequent Wellness Visit.    The following portions of the patient's history were reviewed and updated as appropriate: allergies, current medications, past family history, past medical history, past social history, past surgical history and problem list.    Outpatient Medications Prior to Visit   Medication Sig Dispense Refill   • amLODIPine (NORVASC) 5 MG tablet Take 1 tablet by mouth 2 (Two) Times a Day. 180 tablet 3   • aspirin 81 MG EC tablet Take 81 mg by mouth Daily.     • azilsartan medoxomil (EDARBI) 80 MG tablet tablet Take 1 tablet by mouth Daily. 30 tablet 11   • baclofen (LIORESAL) 10 MG tablet Take 1 tablet by mouth 3 (Three) Times a Day. 90 tablet 1   • BIOTIN 5000 PO Take 1 tablet by mouth Daily.     • Calcium Carbonate (CALCIUM 600 PO) Take 1 tablet by mouth Daily.     • carvedilol (COREG) 25 MG tablet Take 1 tablet by mouth 2 (Two) Times a Day With Meals. 60 tablet 11   • cholecalciferol (VITAMIN D3) 1000 UNITS tablet Take 1,000 Units by mouth Daily.     • CloNIDine (CATAPRES-TTS) 0.2 MG/24HR patch Place 1 patch on the skin 1 (One) Time Per Week. 4 patch 11   • famotidine (PEPCID) 20 MG tablet Take 20 mg by mouth 2 (Two) Times a Day.     • fexofenadine (ALLEGRA) 180 MG tablet Take 180 mg by mouth Daily.     • furosemide (LASIX) 20 MG tablet Take 1 tablet by mouth 2 (Two) Times a Day. 180 tablet 1   • levothyroxine (SYNTHROID, LEVOTHROID) 88 MCG tablet Take 88 mcg by mouth  daily.     • metFORMIN (GLUCOPHAGE) 500 MG tablet Take 2 tablets by mouth 2 (Two) Times a Day With Meals. 360 tablet 1   • Omega-3 Fatty Acids (FISH OIL) 1200 MG capsule capsule Take 1,200 mg by mouth 2 (Two) Times a Day.     • potassium chloride (KLOR-CON) 10 MEQ CR tablet Take 1 tablet by mouth 2 (Two) Times a Day. 180 tablet 3   • rosuvastatin (CRESTOR) 5 MG tablet Take 5 mg by mouth Daily.       No facility-administered medications prior to visit.        Patient Active Problem List   Diagnosis   • Elevated alanine aminotransferase (ALT) level   • Hyperlipidemia   • Vitamin D deficiency   • Diabetes mellitus   • Generalized osteoarthritis   • Eczema   • Essential hypertension   • Muscle spasm   • Sinusitis   • Mild obstructive sleep apnea   • Snoring       Advance Care Planning:  has an advance directive - a copy HAS NOT been provided    Identification of Risk Factors:  Risk factors include: cardiovascular risk.    Review of Systems   Constitutional: Negative for fatigue and unexpected weight change.   HENT: Negative for congestion and sinus pressure.    Respiratory: Negative for cough, chest tightness and shortness of breath.    Cardiovascular: Negative for chest pain, palpitations and leg swelling.   Gastrointestinal: Negative for nausea.   Genitourinary: Negative for dysuria and hematuria.   Musculoskeletal: Positive for arthralgias. Negative for myalgias.   Skin: Negative for color change and rash.   Neurological: Negative for dizziness, syncope, weakness and headaches.       Compared to one year ago, the patient feels her physical health is better.  Compared to one year ago, the patient feels her mental health is the same.    Objective     Physical Exam   Constitutional: She is oriented to person, place, and time. She appears well-developed and well-nourished. She is cooperative.   HENT:   Head: Normocephalic.   Right Ear: External ear normal.   Left Ear: External ear normal.   Nose: Nose normal.  "  Mouth/Throat: Oropharynx is clear and moist.   Eyes: Conjunctivae are normal. Pupils are equal, round, and reactive to light. No scleral icterus.   Neck: Neck supple. No JVD present. Carotid bruit is not present. No thyromegaly present.   Cardiovascular: Normal rate, regular rhythm and normal heart sounds.    Pulmonary/Chest: Effort normal and breath sounds normal.   Abdominal: There is no hepatosplenomegaly.   Musculoskeletal: Normal range of motion. She exhibits no edema.   Neurological: She is alert and oriented to person, place, and time.   No focal deficits no lateralizing signs   Skin: Skin is warm and dry. No rash noted.   Psychiatric: She has a normal mood and affect. Cognition and memory are normal.   Nursing note and vitals reviewed.      Vitals:    10/24/17 0912   BP: 134/78   Pulse: 64   Resp: 16   Temp: 98.3 °F (36.8 °C)   SpO2: 98%   Weight: 170 lb 6.4 oz (77.3 kg)   Height: 65\" (165.1 cm)       Body mass index is 28.36 kg/(m^2).  Discussed the patient's BMI with her. The BMI is above average; no BMI management plan is appropriate..    Assessment/Plan   Patient Self-Management and Personalized Health Advice  The patient has been provided with information about: exercise and prevention of cardiac or vascular disease and preventive services including:   · Colorectal cancer screening, colonoscopy referral placed, Influenza vaccine.    Visit Diagnoses:    ICD-10-CM ICD-9-CM   1. Medicare annual wellness visit, subsequent Z00.00 V70.0   2. Type 2 diabetes mellitus without complication, without long-term current use of insulin E11.9 250.00   3. Essential hypertension I10 401.9   4. Familial hypercholesterolemia E78.01 272.0   5. Vitamin D deficiency E55.9 268.9       Orders Placed This Encounter   Procedures   • Comprehensive Metabolic Panel   • Lipid Panel   • TSH   • Vitamin D 25 Hydroxy   • CBC Auto Differential   • POC Glycosylated Hemoglobin (Hb A1C)   • POC Urinalysis Dipstick, Automated   • POC " Microalbumin   • ECG 12 Lead     Order Specific Question:   Reason for Exam:     Answer:   htn   • CBC & Differential     Order Specific Question:   Manual Differential     Answer:   No       Outpatient Encounter Prescriptions as of 10/24/2017   Medication Sig Dispense Refill   • amLODIPine (NORVASC) 5 MG tablet Take 1 tablet by mouth 2 (Two) Times a Day. 180 tablet 3   • aspirin 81 MG EC tablet Take 81 mg by mouth Daily.     • azilsartan medoxomil (EDARBI) 80 MG tablet tablet Take 1 tablet by mouth Daily. 30 tablet 11   • baclofen (LIORESAL) 10 MG tablet Take 1 tablet by mouth 3 (Three) Times a Day. 90 tablet 1   • BIOTIN 5000 PO Take 1 tablet by mouth Daily.     • Calcium Carbonate (CALCIUM 600 PO) Take 1 tablet by mouth Daily.     • carvedilol (COREG) 25 MG tablet Take 1 tablet by mouth 2 (Two) Times a Day With Meals. 60 tablet 11   • cholecalciferol (VITAMIN D3) 1000 UNITS tablet Take 1,000 Units by mouth Daily.     • CloNIDine (CATAPRES-TTS) 0.2 MG/24HR patch Place 1 patch on the skin 1 (One) Time Per Week. 4 patch 11   • famotidine (PEPCID) 20 MG tablet Take 20 mg by mouth 2 (Two) Times a Day.     • fexofenadine (ALLEGRA) 180 MG tablet Take 180 mg by mouth Daily.     • furosemide (LASIX) 20 MG tablet Take 1 tablet by mouth 2 (Two) Times a Day. 180 tablet 1   • levothyroxine (SYNTHROID, LEVOTHROID) 88 MCG tablet Take 88 mcg by mouth daily.     • metFORMIN (GLUCOPHAGE) 500 MG tablet Take 2 tablets by mouth 2 (Two) Times a Day With Meals. 360 tablet 1   • Omega-3 Fatty Acids (FISH OIL) 1200 MG capsule capsule Take 1,200 mg by mouth 2 (Two) Times a Day.     • potassium chloride (KLOR-CON) 10 MEQ CR tablet Take 1 tablet by mouth 2 (Two) Times a Day. 180 tablet 3   • rosuvastatin (CRESTOR) 5 MG tablet Take 5 mg by mouth Daily.       No facility-administered encounter medications on file as of 10/24/2017.        Reviewed use of high risk medication in the elderly: yes  Reviewed for potential of harmful drug  interactions in the elderly: yes    Follow Up:  Return in about 3 months (around 1/24/2018) for Recheck.     An After Visit Summary and PPPS with all of these plans were given to the patient.          ECG 12 Lead  Date/Time: 10/24/2017 5:05 PM  Performed by: JAMES SINGH  Authorized by: JAMES SINGH   Rhythm: sinus rhythm  Rate: normal  Conduction: conduction normal  ST Segments: ST segments normal  QRS axis: left  Other: no other findings  Clinical impression: non-specific ECG  Comments: HTN cpx            See form

## 2017-11-10 ENCOUNTER — HOSPITAL ENCOUNTER (OUTPATIENT)
Dept: BONE DENSITY | Facility: HOSPITAL | Age: 72
Discharge: HOME | End: 2017-11-10
Attending: FAMILY MEDICINE | Admitting: FAMILY MEDICINE

## 2017-11-10 DIAGNOSIS — Z78.0 MENOPAUSE: ICD-10-CM

## 2017-11-10 PROCEDURE — 77080 DXA BONE DENSITY AXIAL: CPT

## 2017-12-04 RX ORDER — CARVEDILOL 25 MG/1
TABLET ORAL
Qty: 60 TABLET | Refills: 11 | Status: SHIPPED | OUTPATIENT
Start: 2017-12-04 | End: 2018-10-18 | Stop reason: SDUPTHER

## 2017-12-26 DIAGNOSIS — E78.5 DYSLIPIDEMIA: ICD-10-CM

## 2017-12-26 DIAGNOSIS — I10 HTN (HYPERTENSION), MALIGNANT: ICD-10-CM

## 2017-12-31 RX ORDER — FUROSEMIDE 20 MG/1
TABLET ORAL
Qty: 180 TABLET | Refills: 0 | Status: SHIPPED | OUTPATIENT
Start: 2017-12-31 | End: 2018-01-23 | Stop reason: SDUPTHER

## 2018-01-23 ENCOUNTER — OFFICE VISIT (OUTPATIENT)
Dept: FAMILY MEDICINE CLINIC | Facility: CLINIC | Age: 73
End: 2018-01-23

## 2018-01-23 VITALS
WEIGHT: 174 LBS | DIASTOLIC BLOOD PRESSURE: 78 MMHG | BODY MASS INDEX: 28.99 KG/M2 | SYSTOLIC BLOOD PRESSURE: 124 MMHG | OXYGEN SATURATION: 98 % | HEART RATE: 66 BPM | HEIGHT: 65 IN | RESPIRATION RATE: 16 BRPM

## 2018-01-23 DIAGNOSIS — E78.01 FAMILIAL HYPERCHOLESTEROLEMIA: ICD-10-CM

## 2018-01-23 DIAGNOSIS — E11.9 TYPE 2 DIABETES MELLITUS WITHOUT COMPLICATION, WITHOUT LONG-TERM CURRENT USE OF INSULIN (HCC): Primary | ICD-10-CM

## 2018-01-23 DIAGNOSIS — I10 ESSENTIAL HYPERTENSION: ICD-10-CM

## 2018-01-23 LAB — HBA1C MFR BLD: 6 %

## 2018-01-23 PROCEDURE — 83036 HEMOGLOBIN GLYCOSYLATED A1C: CPT | Performed by: FAMILY MEDICINE

## 2018-01-23 PROCEDURE — 99214 OFFICE O/P EST MOD 30 MIN: CPT | Performed by: FAMILY MEDICINE

## 2018-01-23 RX ORDER — BACLOFEN 10 MG/1
10 TABLET ORAL 3 TIMES DAILY PRN
Qty: 90 TABLET | Refills: 2 | Status: SHIPPED | OUTPATIENT
Start: 2018-01-23 | End: 2019-03-14 | Stop reason: SDUPTHER

## 2018-01-23 RX ORDER — FUROSEMIDE 20 MG/1
20 TABLET ORAL
Qty: 180 TABLET | Refills: 1 | Status: SHIPPED | OUTPATIENT
Start: 2018-01-23 | End: 2019-02-12 | Stop reason: SDUPTHER

## 2018-01-23 NOTE — PROGRESS NOTES
Subjective   Leila Pierre is a 72 y.o. female.     Diabetes   She presents for her follow-up diabetic visit. She has type 2 diabetes mellitus. Her disease course has been improving. Pertinent negatives for hypoglycemia include no dizziness. Pertinent negatives for diabetes include no fatigue, no polydipsia, no polyphagia, no polyuria and no visual change. There are no hypoglycemic complications. Symptoms are improving. There are no diabetic complications. Current diabetic treatment includes oral agent (monotherapy). She is compliant with treatment most of the time. An ACE inhibitor/angiotensin II receptor blocker is being taken. Eye exam is current.   Hyperlipidemia   This is a chronic problem. The current episode started more than 1 year ago. The problem is controlled. Exacerbating diseases include diabetes. There are no known factors aggravating her hyperlipidemia. Pertinent negatives include no myalgias or shortness of breath. Current antihyperlipidemic treatment includes statins (not currently taking crestor). The current treatment provides significant improvement of lipids. Compliance problems include medication side effects.  Risk factors for coronary artery disease include hypertension, post-menopausal, a sedentary lifestyle and diabetes mellitus.   Hypertension   This is a chronic problem. The current episode started more than 1 year ago. The problem is controlled. Pertinent negatives include no palpitations, peripheral edema or shortness of breath. Risk factors for coronary artery disease include post-menopausal state, sedentary lifestyle and stress. Past treatments include central alpha agonists, calcium channel blockers, angiotensin blockers, beta blockers and diuretics. The current treatment provides moderate improvement. There are no compliance problems.  There is no history of angina, kidney disease, CAD/MI or left ventricular hypertrophy.        The following portions of the patient's history were  "reviewed and updated as appropriate: allergies, current medications, past social history and problem list.    Review of Systems   Constitutional: Negative for appetite change, diaphoresis, fatigue and unexpected weight change.   HENT: Negative for congestion and sinus pressure.    Eyes: Negative for visual disturbance.   Respiratory: Negative for cough, chest tightness and shortness of breath.    Cardiovascular: Negative for palpitations and leg swelling.   Gastrointestinal: Negative for diarrhea, nausea and vomiting.   Endocrine: Negative for polydipsia, polyphagia and polyuria.   Genitourinary: Negative for dyspareunia and hematuria.   Musculoskeletal: Negative for myalgias.   Skin: Positive for rash. Negative for color change.   Neurological: Negative for dizziness, syncope and light-headedness.       Objective   /78  Pulse 66  Resp 16  Ht 165.1 cm (65\")  Wt 78.9 kg (174 lb)  LMP  (LMP Unknown)  SpO2 98%  BMI 28.96 kg/m2  Physical Exam   Constitutional: She is oriented to person, place, and time. She appears well-developed and well-nourished. She is cooperative.   HENT:   Head: Normocephalic.   Right Ear: External ear normal.   Left Ear: External ear normal.   Nose: Nose normal.   Mouth/Throat: Oropharynx is clear and moist.   Eyes: Conjunctivae are normal. Pupils are equal, round, and reactive to light. No scleral icterus.   Neck: Neck supple. Carotid bruit is not present. No thyromegaly present.   Cardiovascular: Normal rate, regular rhythm and normal heart sounds.    Pulmonary/Chest: Effort normal and breath sounds normal.   Abdominal: There is no hepatosplenomegaly.   Musculoskeletal: Normal range of motion. She exhibits no edema or tenderness.   Neurological: She is alert and oriented to person, place, and time.   No focal deficits no lateralizing signs   Skin: Skin is warm and dry. No rash noted.   Psychiatric: She has a normal mood and affect. Cognition and memory are normal.   Nursing note " and vitals reviewed.      Assessment/Plan   Problem List Items Addressed This Visit     Hyperlipidemia    Diabetes mellitus - Primary    Relevant Medications    metFORMIN (GLUCOPHAGE) 500 MG tablet    Other Relevant Orders    POC Glycosylated Hemoglobin (Hb A1C) (Completed)    Essential hypertension    Relevant Medications    furosemide (LASIX) 20 MG tablet          New Medications Ordered This Visit   Medications   • baclofen (LIORESAL) 10 MG tablet     Sig: Take 1 tablet by mouth 3 (Three) Times a Day As Needed for Muscle Spasms.     Dispense:  90 tablet     Refill:  2   • furosemide (LASIX) 20 MG tablet     Sig: Take 1 tablet by mouth 2 (Two) Times a Day.     Dispense:  180 tablet     Refill:  1   • metFORMIN (GLUCOPHAGE) 500 MG tablet     Sig: Take 2 tablets by mouth 2 (Two) Times a Day With Meals.     Dispense:  360 tablet     Refill:  1

## 2018-02-22 DIAGNOSIS — I10 HTN (HYPERTENSION), MALIGNANT: ICD-10-CM

## 2018-02-22 DIAGNOSIS — E78.5 DYSLIPIDEMIA: ICD-10-CM

## 2018-03-06 RX ORDER — POTASSIUM CHLORIDE 750 MG/1
TABLET, EXTENDED RELEASE ORAL
Qty: 180 TABLET | Refills: 2 | Status: SHIPPED | OUTPATIENT
Start: 2018-03-06 | End: 2019-08-28 | Stop reason: SDUPTHER

## 2018-03-08 ENCOUNTER — OFFICE VISIT (OUTPATIENT)
Dept: CARDIOLOGY | Facility: CLINIC | Age: 73
End: 2018-03-08

## 2018-03-08 VITALS
DIASTOLIC BLOOD PRESSURE: 88 MMHG | HEART RATE: 66 BPM | SYSTOLIC BLOOD PRESSURE: 143 MMHG | WEIGHT: 174.8 LBS | BODY MASS INDEX: 29.12 KG/M2 | HEIGHT: 65 IN

## 2018-03-08 DIAGNOSIS — E78.2 MIXED HYPERLIPIDEMIA: ICD-10-CM

## 2018-03-08 DIAGNOSIS — I10 ESSENTIAL HYPERTENSION: Primary | ICD-10-CM

## 2018-03-08 PROCEDURE — 99213 OFFICE O/P EST LOW 20 MIN: CPT | Performed by: INTERNAL MEDICINE

## 2018-03-08 NOTE — PROGRESS NOTES
"  OFFICE FOLLOW UP     Date of Encounter:2018     Name: Leila Pierre  : 1945  Address: [address] [facility] [address]  Phone: [phone]    PCP: Ike Cifuentes MD  [address]  [facility] [address]    Leila Pierre is a 72 y.o. female.    Chief Complaint: Follow up of HTN, dyslipidemia    Problem List:   1. HTN, uncontrolled recently  a. Onset 40 years ago  b. Renal artery duplex normal with no evidence of PAMELA (10/17/2016)  c. Negative pheo evaluation  2. Dyslipidemia, with multiple statin intolerances  3. DM2  4. Hypothyroidism  5. Obstructive Sleep Apnea  a. Using oral appliance, \"snore guard\"    Allergies   Allergen Reactions   • Clarithromycin    • Decongest-Aid [Pseudoephedrine]      Gives too much energy and patient is unable to sleep   • Irbesartan    • Lescol [Fluvastatin Sodium] Diarrhea   • Lipitor [Atorvastatin] Myalgia   • Other      bandaids    • Sulfa Antibiotics    • Valsartan      Current Medications:   •  Amlodipine 5 MG by mouth 2 (Two) Times a Day.  •  aspirin 81 MG EC by mouth Daily.   •  azilsartan medoxomil 80 MG by mouth Daily.  •  baclofen 10 MG by mouth 3 (Three) Times a Day As Needed for Muscle Spasms  •  BIOTIN 5000 by mouth Daily.  •  Calcium Carbonate by mouth Daily.  •  carvedilol 25 MG BY MOUTH TWICE A DAY WITH MEALS  •  cholecalciferol 1,000 Units by mouth Daily.  •  Clonidine 0.2 MG/24HR patch, Place 1 patch on the skin 1 (One) Time Per Week.  •  famotidine 20 mg by mouth 2 (Two) Times a Day.  •  fexofenadine 180 mg by mouth Daily.  •  furosemide 20 MG by mouth 2 (Two) Times a Day.  •  levothyroxine 88 mcg by mouth daily.  •  metformin 500 MG Take 2 tablets by mouth 2 (Two) Times a Day With Meals., Disp: 360 tablet, Rfl: 1  •  Omega-3 Fatty Acids 1200 MG by mouth 2 (Two) Times a Day.  •  potassium chloride 10 MEQ CR BY MOUTH TWICE A DAY  •  rosuvastatin 5 mg by mouth Daily.    History of Present Illness:  The patient returns for " "follow-up today.  Since her last visit she stopped rosuvastatin (and she is currently on no statin) because of some GI upset as well as her feeling that it increases her glucoses significantly.  She sees Ramírez Calabrese M.D. and has apparently had recent hemoglobin A1cs in the low sixes or high fives.  She did not bring a lipid panel today but reports that her cholesterol is elevated.  She has not had angina or symptoms of heart failure.  Home blood pressures (she brings in a \"diary\") have been at the ULN per new AHA criteria. She sees GUSTAVO Calabrese M.D. For her DM.            The following portions of the patient's history were reviewed and updated as appropriate: allergies, current medications and problem list.    ROS: Pertinent positives as listed in the HPI.  All other systems reviewed and negative.    Objective:    Vitals:    03/08/18 1116 03/08/18 1121   BP: 152/94 143/88   BP Location: Right arm Right arm   Patient Position: Sitting Standing   Pulse: 63 66   Weight: 79.3 kg (174 lb 12.8 oz)    Height: 165.1 cm (65\")        Physical Exam:  GENERAL: Alert, cooperative, in no acute distress.   HEENT: Fundoscopic deferred, otherwise unremarkable.  NECK: No Jugular venous distention, adenopathy, or thyromegaly noted.   HEART: Regular rhythm, normal rate, and no murmurs, gallops, or rubs.   LUNGS: Clear to auscultation bilaterally. No wheezing, rales or ronchi.  ABDOMEN: Flat without evidence of organomegaly, masses, or tenderness.  NEUROLOGIC: No focal abnormalities involving strength or sensation are noted.   EXTREMITIES: No clubbing, cyanosis, or edema noted.     Diagnostic Data:    Lab Results   Component Value Date    HGBA1C 6.0 01/23/2018     Lab Results   Component Value Date    TSH 2.858 10/24/2017     Lab Results   Component Value Date    CHOL 231 (H) 10/24/2017    TRIG 200 (H) 10/24/2017    HDL 45 10/24/2017     (H) 10/24/2017     Lab Results   Component Value Date    GLUCOSE 120 (H) 10/24/2017    BUN 16 " 10/24/2017    CREATININE 0.80 10/24/2017    EGFRIFNONA 71 10/24/2017    BCR 20.0 10/24/2017    K 4.4 10/24/2017    CO2 28.0 10/24/2017    CALCIUM 10.0 10/24/2017    ALBUMIN 4.90 (H) 10/24/2017    LABIL2 1.7 10/24/2017    AST 24 10/24/2017    ALT 33 10/24/2017     Lab Results   Component Value Date    WBC 7.05 10/24/2017    HGB 14.7 10/24/2017    HCT 44.7 (H) 10/24/2017    MCV 93.5 10/24/2017     10/24/2017     Procedures    Assessment and Plan: #1: Her blood pressures are at the upper limits of normal to slightly elevated on a current aggressive regimen as described above.  At this time, I will not increase medications.  I have asked the patient to try to lose 10-15 pounds as I think that this would be the best and safest way to bring all blood pressures to target.  We discussed this.                                         #2: Her statin intolerances are noted and in October of last year and LDL cholesterol (the last one that I have) was 175.  I discussed this with her.  We will give her a trial of Livalo 2 mg by mouth daily at bedtime.  She is seeing Dr. Calabrese in about                                              one month and his input/check on this is appreciated.     I will see her back next year, sooner as needed.    IUziel MD, personally performed the services described as documented by the above named individual. I have made any necessary edits and it is both accurate and complete 3/8/2018  1:31 PM        Scribed for Uziel Meza MD by Keke Manning RN. 03/08/2018 11:23 AM.  IUziel MD, [facility], [facility], personally performed the services described in this documentation as scribed by the above named individual in my presence, and it is both accurate and complete. At 12:23 PM on 03/08/2018      EMR Dragon/Transcription Disclaimer:  Much of this encounter note is an electronic transcription/translation of spoken language to printed text.  The electronic translation of  spoken language may permit erroneous, or at times, nonsensical words or phrases to be inadvertently transcribed.  Although I have reviewed the note for such errors, some may still exist.

## 2018-05-08 ENCOUNTER — OFFICE VISIT (OUTPATIENT)
Dept: FAMILY MEDICINE CLINIC | Facility: CLINIC | Age: 73
End: 2018-05-08

## 2018-05-08 VITALS
RESPIRATION RATE: 16 BRPM | HEART RATE: 66 BPM | DIASTOLIC BLOOD PRESSURE: 82 MMHG | BODY MASS INDEX: 28.66 KG/M2 | WEIGHT: 172 LBS | HEIGHT: 65 IN | SYSTOLIC BLOOD PRESSURE: 126 MMHG | OXYGEN SATURATION: 98 %

## 2018-05-08 DIAGNOSIS — E78.01 FAMILIAL HYPERCHOLESTEROLEMIA: ICD-10-CM

## 2018-05-08 DIAGNOSIS — I10 ESSENTIAL HYPERTENSION: ICD-10-CM

## 2018-05-08 DIAGNOSIS — E11.9 TYPE 2 DIABETES MELLITUS WITHOUT COMPLICATION, WITHOUT LONG-TERM CURRENT USE OF INSULIN (HCC): Primary | ICD-10-CM

## 2018-05-08 LAB
ALBUMIN SERPL-MCNC: 4.8 G/DL (ref 3.2–4.8)
ALBUMIN/GLOB SERPL: 2 G/DL (ref 1.5–2.5)
ALP SERPL-CCNC: 45 U/L (ref 25–100)
ALT SERPL W P-5'-P-CCNC: 31 U/L (ref 7–40)
ANION GAP SERPL CALCULATED.3IONS-SCNC: 4 MMOL/L (ref 3–11)
ARTICHOKE IGE QN: 159 MG/DL (ref 0–130)
AST SERPL-CCNC: 23 U/L (ref 0–33)
BASOPHILS # BLD AUTO: 0.02 10*3/MM3 (ref 0–0.2)
BASOPHILS NFR BLD AUTO: 0.3 % (ref 0–1)
BILIRUB SERPL-MCNC: 0.5 MG/DL (ref 0.3–1.2)
BUN BLD-MCNC: 18 MG/DL (ref 9–23)
BUN/CREAT SERPL: 20 (ref 7–25)
CALCIUM SPEC-SCNC: 9.6 MG/DL (ref 8.7–10.4)
CHLORIDE SERPL-SCNC: 104 MMOL/L (ref 99–109)
CHOLEST SERPL-MCNC: 214 MG/DL (ref 0–200)
CO2 SERPL-SCNC: 29 MMOL/L (ref 20–31)
CREAT BLD-MCNC: 0.9 MG/DL (ref 0.6–1.3)
DEPRECATED RDW RBC AUTO: 49.1 FL (ref 37–54)
EOSINOPHIL # BLD AUTO: 0.16 10*3/MM3 (ref 0–0.3)
EOSINOPHIL NFR BLD AUTO: 2.6 % (ref 0–3)
ERYTHROCYTE [DISTWIDTH] IN BLOOD BY AUTOMATED COUNT: 14.2 % (ref 11.3–14.5)
GFR SERPL CREATININE-BSD FRML MDRD: 62 ML/MIN/1.73
GLOBULIN UR ELPH-MCNC: 2.4 GM/DL
GLUCOSE BLD-MCNC: 125 MG/DL (ref 70–100)
HBA1C MFR BLD: 5.7 %
HCT VFR BLD AUTO: 43.9 % (ref 34.5–44)
HDLC SERPL-MCNC: 41 MG/DL (ref 40–60)
HGB BLD-MCNC: 14.5 G/DL (ref 11.5–15.5)
IMM GRANULOCYTES # BLD: 0.02 10*3/MM3 (ref 0–0.03)
IMM GRANULOCYTES NFR BLD: 0.3 % (ref 0–0.6)
LYMPHOCYTES # BLD AUTO: 2.36 10*3/MM3 (ref 0.6–4.8)
LYMPHOCYTES NFR BLD AUTO: 37.8 % (ref 24–44)
MCH RBC QN AUTO: 31.4 PG (ref 27–31)
MCHC RBC AUTO-ENTMCNC: 33 G/DL (ref 32–36)
MCV RBC AUTO: 95 FL (ref 80–99)
MONOCYTES # BLD AUTO: 0.45 10*3/MM3 (ref 0–1)
MONOCYTES NFR BLD AUTO: 7.2 % (ref 0–12)
NEUTROPHILS # BLD AUTO: 3.23 10*3/MM3 (ref 1.5–8.3)
NEUTROPHILS NFR BLD AUTO: 51.8 % (ref 41–71)
PLATELET # BLD AUTO: 188 10*3/MM3 (ref 150–450)
PMV BLD AUTO: 12.7 FL (ref 6–12)
POTASSIUM BLD-SCNC: 4.4 MMOL/L (ref 3.5–5.5)
PROT SERPL-MCNC: 7.2 G/DL (ref 5.7–8.2)
RBC # BLD AUTO: 4.62 10*6/MM3 (ref 3.89–5.14)
SODIUM BLD-SCNC: 137 MMOL/L (ref 132–146)
TRIGL SERPL-MCNC: 153 MG/DL (ref 0–150)
TSH SERPL DL<=0.05 MIU/L-ACNC: 3.54 MIU/ML (ref 0.35–5.35)
WBC NRBC COR # BLD: 6.24 10*3/MM3 (ref 3.5–10.8)

## 2018-05-08 PROCEDURE — 36415 COLL VENOUS BLD VENIPUNCTURE: CPT | Performed by: FAMILY MEDICINE

## 2018-05-08 PROCEDURE — 85025 COMPLETE CBC W/AUTO DIFF WBC: CPT | Performed by: FAMILY MEDICINE

## 2018-05-08 PROCEDURE — 80061 LIPID PANEL: CPT | Performed by: FAMILY MEDICINE

## 2018-05-08 PROCEDURE — 84443 ASSAY THYROID STIM HORMONE: CPT | Performed by: FAMILY MEDICINE

## 2018-05-08 PROCEDURE — 99214 OFFICE O/P EST MOD 30 MIN: CPT | Performed by: FAMILY MEDICINE

## 2018-05-08 PROCEDURE — 80053 COMPREHEN METABOLIC PANEL: CPT | Performed by: FAMILY MEDICINE

## 2018-05-08 PROCEDURE — 83036 HEMOGLOBIN GLYCOSYLATED A1C: CPT | Performed by: FAMILY MEDICINE

## 2018-05-08 NOTE — PROGRESS NOTES
Subjective   Leila Pierre is a 73 y.o. female.     Hyperlipidemia   This is a chronic problem. The current episode started more than 1 year ago. The problem is controlled. Exacerbating diseases include diabetes. There are no known factors aggravating her hyperlipidemia. Pertinent negatives include no chest pain, myalgias or shortness of breath. Current antihyperlipidemic treatment includes statins (not currently taking crestor). The current treatment provides significant improvement of lipids. Compliance problems include medication side effects.  Risk factors for coronary artery disease include hypertension, post-menopausal, a sedentary lifestyle and diabetes mellitus.   Diabetes   She presents for her follow-up diabetic visit. She has type 2 diabetes mellitus. Her disease course has been improving. Pertinent negatives for hypoglycemia include no dizziness or headaches. Pertinent negatives for diabetes include no chest pain, no fatigue, no polydipsia, no polyphagia, no polyuria, no visual change and no weakness. There are no hypoglycemic complications. Symptoms are improving. There are no diabetic complications. Current diabetic treatment includes oral agent (monotherapy). She is compliant with treatment most of the time. An ACE inhibitor/angiotensin II receptor blocker is being taken. Eye exam is current.   Hypertension   This is a chronic problem. The current episode started more than 1 year ago. The problem is controlled. Pertinent negatives include no chest pain, headaches, palpitations, peripheral edema or shortness of breath. Risk factors for coronary artery disease include post-menopausal state, sedentary lifestyle and stress. Current antihypertension treatment includes central alpha agonists, calcium channel blockers, angiotensin blockers, beta blockers and diuretics. The current treatment provides moderate improvement. There are no compliance problems.  There is no history of angina, kidney disease,  "CAD/MI or left ventricular hypertrophy.        The following portions of the patient's history were reviewed and updated as appropriate: allergies, current medications, past social history and problem list.    Review of Systems   Constitutional: Negative for appetite change, diaphoresis, fatigue and unexpected weight change.   HENT: Negative for congestion and sore throat.    Eyes: Negative for visual disturbance.   Respiratory: Negative for cough, chest tightness and shortness of breath.    Cardiovascular: Negative for chest pain, palpitations and leg swelling.   Gastrointestinal: Negative for diarrhea, nausea and vomiting.   Endocrine: Negative for polydipsia, polyphagia and polyuria.   Genitourinary: Negative for dysuria and hematuria.   Musculoskeletal: Negative for myalgias.   Skin: Negative for color change and rash.   Neurological: Negative for dizziness, syncope, weakness, light-headedness, numbness and headaches.   Psychiatric/Behavioral: Negative.        Objective   /82   Pulse 66   Resp 16   Ht 165.1 cm (65\")   Wt 78 kg (172 lb)   LMP  (LMP Unknown)   SpO2 98%   BMI 28.62 kg/m²   Physical Exam   Constitutional: She is oriented to person, place, and time. She appears well-developed and well-nourished. She is cooperative.   HENT:   Head: Normocephalic.   Right Ear: External ear normal.   Left Ear: External ear normal.   Nose: Nose normal.   Mouth/Throat: Oropharynx is clear and moist.   Eyes: Conjunctivae are normal. Pupils are equal, round, and reactive to light. No scleral icterus.   Neck: Neck supple. Carotid bruit is not present. No thyromegaly present.   Cardiovascular: Normal rate, regular rhythm and normal heart sounds.    Pulmonary/Chest: Effort normal and breath sounds normal.   Abdominal: There is no hepatosplenomegaly.   Musculoskeletal: Normal range of motion. She exhibits no edema.   Neurological: She is alert and oriented to person, place, and time.   No focal deficits no " lateralizing signs   Skin: Skin is warm and dry. No rash noted.   Psychiatric: She has a normal mood and affect. Cognition and memory are normal.   Nursing note and vitals reviewed.      Assessment/Plan   Problem List Items Addressed This Visit     Hyperlipidemia    Relevant Orders    Comprehensive Metabolic Panel    Lipid Panel    Diabetes mellitus - Primary    Relevant Orders    POC Glycosylated Hemoglobin (Hb A1C) (Completed)    Comprehensive Metabolic Panel    Lipid Panel    TSH    CBC & Differential    CBC Auto Differential    Essential hypertension    Relevant Orders    Comprehensive Metabolic Panel    Lipid Panel      Other Visit Diagnoses    None.         No orders of the defined types were placed in this encounter.

## 2018-06-20 ENCOUNTER — TRANSCRIBE ORDERS (OUTPATIENT)
Dept: ADMINISTRATIVE | Facility: HOSPITAL | Age: 73
End: 2018-06-20

## 2018-06-20 DIAGNOSIS — Z12.31 VISIT FOR SCREENING MAMMOGRAM: Primary | ICD-10-CM

## 2018-08-08 ENCOUNTER — OFFICE VISIT (OUTPATIENT)
Dept: FAMILY MEDICINE CLINIC | Facility: CLINIC | Age: 73
End: 2018-08-08

## 2018-08-08 VITALS
OXYGEN SATURATION: 97 % | HEIGHT: 65 IN | DIASTOLIC BLOOD PRESSURE: 82 MMHG | RESPIRATION RATE: 16 BRPM | WEIGHT: 174.8 LBS | HEART RATE: 67 BPM | SYSTOLIC BLOOD PRESSURE: 124 MMHG | BODY MASS INDEX: 29.12 KG/M2

## 2018-08-08 DIAGNOSIS — E11.9 TYPE 2 DIABETES MELLITUS WITHOUT COMPLICATION, WITHOUT LONG-TERM CURRENT USE OF INSULIN (HCC): Primary | ICD-10-CM

## 2018-08-08 DIAGNOSIS — E78.01 FAMILIAL HYPERCHOLESTEROLEMIA: ICD-10-CM

## 2018-08-08 DIAGNOSIS — I10 ESSENTIAL HYPERTENSION: ICD-10-CM

## 2018-08-08 LAB — HBA1C MFR BLD: 5.9 %

## 2018-08-08 PROCEDURE — 99213 OFFICE O/P EST LOW 20 MIN: CPT | Performed by: FAMILY MEDICINE

## 2018-08-08 PROCEDURE — 83036 HEMOGLOBIN GLYCOSYLATED A1C: CPT | Performed by: FAMILY MEDICINE

## 2018-08-08 NOTE — PROGRESS NOTES
Subjective   Leila Pierre is a 73 y.o. female.     Hyperlipidemia   This is a chronic problem. The current episode started more than 1 year ago. The problem is controlled. Exacerbating diseases include diabetes. There are no known factors aggravating her hyperlipidemia. Pertinent negatives include no chest pain, myalgias or shortness of breath. Current antihyperlipidemic treatment includes statins (not currently taking crestor). The current treatment provides significant improvement of lipids. Compliance problems include medication side effects.  Risk factors for coronary artery disease include hypertension, post-menopausal, a sedentary lifestyle and diabetes mellitus.   Hypertension   This is a chronic problem. The current episode started more than 1 year ago. The problem is controlled. Pertinent negatives include no chest pain, headaches, palpitations, peripheral edema or shortness of breath. Risk factors for coronary artery disease include post-menopausal state, sedentary lifestyle and stress. Current antihypertension treatment includes central alpha agonists, calcium channel blockers, angiotensin blockers, beta blockers and diuretics. The current treatment provides moderate improvement. There are no compliance problems.  There is no history of angina, kidney disease, CAD/MI or left ventricular hypertrophy.   Diabetes   She presents for her follow-up diabetic visit. She has type 2 diabetes mellitus. Her disease course has been improving. Pertinent negatives for hypoglycemia include no dizziness or headaches. Pertinent negatives for diabetes include no chest pain, no fatigue, no polydipsia, no polyphagia, no polyuria, no visual change and no weakness. There are no hypoglycemic complications. Symptoms are improving. There are no diabetic complications. Current diabetic treatment includes oral agent (monotherapy). She is compliant with treatment most of the time. An ACE inhibitor/angiotensin II receptor blocker  "is being taken. Eye exam is current.        The following portions of the patient's history were reviewed and updated as appropriate: allergies, current medications, past social history and problem list.    Review of Systems   Constitutional: Negative for appetite change, diaphoresis, fatigue and unexpected weight change.   HENT: Negative for congestion and sore throat.    Eyes: Negative for visual disturbance.   Respiratory: Negative for cough, chest tightness and shortness of breath.    Cardiovascular: Negative for chest pain, palpitations and leg swelling.   Gastrointestinal: Negative for diarrhea, nausea and vomiting.   Endocrine: Negative for polydipsia, polyphagia and polyuria.   Genitourinary: Negative for dysuria and hematuria.   Musculoskeletal: Negative for myalgias.   Skin: Negative for color change and rash.   Neurological: Negative for dizziness, syncope, weakness, light-headedness, numbness and headaches.       Objective   /82   Pulse 67   Resp 16   Ht 165.1 cm (65\")   Wt 79.3 kg (174 lb 12.8 oz)   LMP  (LMP Unknown)   SpO2 97%   BMI 29.09 kg/m²   Physical Exam   Constitutional: She is oriented to person, place, and time. She appears well-developed and well-nourished. She is cooperative.   HENT:   Head: Normocephalic.   Right Ear: External ear normal.   Left Ear: External ear normal.   Nose: Nose normal.   Mouth/Throat: Oropharynx is clear and moist.   Eyes: Pupils are equal, round, and reactive to light. Conjunctivae are normal. No scleral icterus.   Neck: Neck supple. Carotid bruit is not present. No thyromegaly present.   Cardiovascular: Normal rate, regular rhythm and normal heart sounds.    Pulmonary/Chest: Effort normal and breath sounds normal.   Abdominal: There is no hepatosplenomegaly.   Musculoskeletal: Normal range of motion. She exhibits no edema.   Neurological: She is alert and oriented to person, place, and time.   No focal deficits no lateralizing signs   Skin: Skin is " warm and dry. No rash noted.   Psychiatric: She has a normal mood and affect. Cognition and memory are normal.   Nursing note and vitals reviewed.      Assessment/Plan   Problem List Items Addressed This Visit     Hyperlipidemia    Diabetes mellitus (CMS/HCC) - Primary    Relevant Orders    POC Glycosylated Hemoglobin (Hb A1C) (Completed)    Essential hypertension          No orders of the defined types were placed in this encounter.

## 2018-08-13 RX ORDER — AMLODIPINE BESYLATE 5 MG/1
TABLET ORAL
Qty: 180 TABLET | Refills: 1 | Status: SHIPPED | OUTPATIENT
Start: 2018-08-13 | End: 2019-02-10 | Stop reason: SDUPTHER

## 2018-08-16 ENCOUNTER — HOSPITAL ENCOUNTER (OUTPATIENT)
Dept: MAMMOGRAPHY | Facility: HOSPITAL | Age: 73
Discharge: HOME | End: 2018-08-16
Attending: FAMILY MEDICINE | Admitting: FAMILY MEDICINE

## 2018-08-16 DIAGNOSIS — Z12.31 VISIT FOR SCREENING MAMMOGRAM: ICD-10-CM

## 2018-08-16 PROCEDURE — 77067 SCR MAMMO BI INCL CAD: CPT | Performed by: RADIOLOGY

## 2018-08-16 PROCEDURE — 77063 BREAST TOMOSYNTHESIS BI: CPT | Performed by: RADIOLOGY

## 2018-08-16 PROCEDURE — 77063 BREAST TOMOSYNTHESIS BI: CPT

## 2018-08-16 PROCEDURE — 77067 SCR MAMMO BI INCL CAD: CPT

## 2018-10-19 RX ORDER — CARVEDILOL 25 MG/1
TABLET ORAL
Qty: 180 TABLET | Refills: 3 | Status: SHIPPED | OUTPATIENT
Start: 2018-10-19 | End: 2019-10-13 | Stop reason: SDUPTHER

## 2018-11-08 ENCOUNTER — OFFICE VISIT (OUTPATIENT)
Dept: FAMILY MEDICINE CLINIC | Facility: CLINIC | Age: 73
End: 2018-11-08

## 2018-11-08 ENCOUNTER — RESULTS ENCOUNTER (OUTPATIENT)
Dept: FAMILY MEDICINE CLINIC | Facility: CLINIC | Age: 73
End: 2018-11-08

## 2018-11-08 VITALS
TEMPERATURE: 98 F | WEIGHT: 174.8 LBS | DIASTOLIC BLOOD PRESSURE: 82 MMHG | HEIGHT: 65 IN | HEART RATE: 64 BPM | BODY MASS INDEX: 29.12 KG/M2 | SYSTOLIC BLOOD PRESSURE: 144 MMHG | OXYGEN SATURATION: 98 % | RESPIRATION RATE: 16 BRPM

## 2018-11-08 DIAGNOSIS — E78.01 FAMILIAL HYPERCHOLESTEROLEMIA: ICD-10-CM

## 2018-11-08 DIAGNOSIS — Z00.00 MEDICARE ANNUAL WELLNESS VISIT, SUBSEQUENT: Primary | ICD-10-CM

## 2018-11-08 DIAGNOSIS — I10 ESSENTIAL HYPERTENSION: ICD-10-CM

## 2018-11-08 DIAGNOSIS — E11.9 TYPE 2 DIABETES MELLITUS WITHOUT COMPLICATION, WITHOUT LONG-TERM CURRENT USE OF INSULIN (HCC): ICD-10-CM

## 2018-11-08 DIAGNOSIS — E55.9 VITAMIN D DEFICIENCY: ICD-10-CM

## 2018-11-08 DIAGNOSIS — Z12.11 SCREENING FOR COLON CANCER: ICD-10-CM

## 2018-11-08 LAB
25(OH)D3 SERPL-MCNC: 35.7 NG/ML
ALBUMIN SERPL-MCNC: 5.09 G/DL (ref 3.2–4.8)
ALBUMIN/GLOB SERPL: 2.4 G/DL (ref 1.5–2.5)
ALP SERPL-CCNC: 49 U/L (ref 25–100)
ALT SERPL W P-5'-P-CCNC: 51 U/L (ref 7–40)
ANION GAP SERPL CALCULATED.3IONS-SCNC: 11 MMOL/L (ref 3–11)
ARTICHOKE IGE QN: 180 MG/DL (ref 0–130)
AST SERPL-CCNC: 28 U/L (ref 0–33)
BASOPHILS # BLD AUTO: 0.02 10*3/MM3 (ref 0–0.2)
BASOPHILS NFR BLD AUTO: 0.3 % (ref 0–1)
BILIRUB BLD-MCNC: NEGATIVE MG/DL
BILIRUB SERPL-MCNC: 0.4 MG/DL (ref 0.3–1.2)
BUN BLD-MCNC: 15 MG/DL (ref 9–23)
BUN/CREAT SERPL: 17 (ref 7–25)
CALCIUM SPEC-SCNC: 10 MG/DL (ref 8.7–10.4)
CHLORIDE SERPL-SCNC: 104 MMOL/L (ref 99–109)
CHOLEST SERPL-MCNC: 224 MG/DL (ref 0–200)
CLARITY, POC: CLEAR
CO2 SERPL-SCNC: 26 MMOL/L (ref 20–31)
COLOR UR: YELLOW
CREAT BLD-MCNC: 0.88 MG/DL (ref 0.6–1.3)
DEPRECATED RDW RBC AUTO: 46.1 FL (ref 37–54)
EOSINOPHIL # BLD AUTO: 0.17 10*3/MM3 (ref 0–0.3)
EOSINOPHIL NFR BLD AUTO: 2.3 % (ref 0–3)
ERYTHROCYTE [DISTWIDTH] IN BLOOD BY AUTOMATED COUNT: 13.4 % (ref 11.3–14.5)
GFR SERPL CREATININE-BSD FRML MDRD: 63 ML/MIN/1.73
GLOBULIN UR ELPH-MCNC: 2.1 GM/DL
GLUCOSE BLD-MCNC: 119 MG/DL (ref 70–100)
GLUCOSE UR STRIP-MCNC: NEGATIVE MG/DL
HBA1C MFR BLD: 6 %
HCT VFR BLD AUTO: 45.5 % (ref 34.5–44)
HDLC SERPL-MCNC: 47 MG/DL (ref 40–60)
HGB BLD-MCNC: 14.9 G/DL (ref 11.5–15.5)
IMM GRANULOCYTES # BLD: 0.01 10*3/MM3 (ref 0–0.03)
IMM GRANULOCYTES NFR BLD: 0.1 % (ref 0–0.6)
KETONES UR QL: NEGATIVE
LEUKOCYTE EST, POC: NEGATIVE
LYMPHOCYTES # BLD AUTO: 2.6 10*3/MM3 (ref 0.6–4.8)
LYMPHOCYTES NFR BLD AUTO: 34.9 % (ref 24–44)
MCH RBC QN AUTO: 30.7 PG (ref 27–31)
MCHC RBC AUTO-ENTMCNC: 32.7 G/DL (ref 32–36)
MCV RBC AUTO: 93.6 FL (ref 80–99)
MONOCYTES # BLD AUTO: 0.69 10*3/MM3 (ref 0–1)
MONOCYTES NFR BLD AUTO: 9.3 % (ref 0–12)
NEUTROPHILS # BLD AUTO: 3.96 10*3/MM3 (ref 1.5–8.3)
NEUTROPHILS NFR BLD AUTO: 53.2 % (ref 41–71)
NITRITE UR-MCNC: NEGATIVE MG/ML
PH UR: 5.5 [PH] (ref 5–8)
PLATELET # BLD AUTO: 218 10*3/MM3 (ref 150–450)
PMV BLD AUTO: 13.3 FL (ref 6–12)
POTASSIUM BLD-SCNC: 4.6 MMOL/L (ref 3.5–5.5)
PROT SERPL-MCNC: 7.2 G/DL (ref 5.7–8.2)
PROT UR STRIP-MCNC: NEGATIVE MG/DL
RBC # BLD AUTO: 4.86 10*6/MM3 (ref 3.89–5.14)
RBC # UR STRIP: ABNORMAL /UL
SODIUM BLD-SCNC: 141 MMOL/L (ref 132–146)
SP GR UR: 1.01 (ref 1–1.03)
TRIGL SERPL-MCNC: 151 MG/DL (ref 0–150)
TSH SERPL DL<=0.05 MIU/L-ACNC: 2.9 MIU/ML (ref 0.35–5.35)
UROBILINOGEN UR QL: NORMAL
WBC NRBC COR # BLD: 7.44 10*3/MM3 (ref 3.5–10.8)

## 2018-11-08 PROCEDURE — 93000 ELECTROCARDIOGRAM COMPLETE: CPT | Performed by: FAMILY MEDICINE

## 2018-11-08 PROCEDURE — G0439 PPPS, SUBSEQ VISIT: HCPCS | Performed by: FAMILY MEDICINE

## 2018-11-08 PROCEDURE — 80053 COMPREHEN METABOLIC PANEL: CPT | Performed by: FAMILY MEDICINE

## 2018-11-08 PROCEDURE — 82306 VITAMIN D 25 HYDROXY: CPT | Performed by: FAMILY MEDICINE

## 2018-11-08 PROCEDURE — 36415 COLL VENOUS BLD VENIPUNCTURE: CPT | Performed by: FAMILY MEDICINE

## 2018-11-08 PROCEDURE — 81003 URINALYSIS AUTO W/O SCOPE: CPT | Performed by: FAMILY MEDICINE

## 2018-11-08 PROCEDURE — 83036 HEMOGLOBIN GLYCOSYLATED A1C: CPT | Performed by: FAMILY MEDICINE

## 2018-11-08 PROCEDURE — 80061 LIPID PANEL: CPT | Performed by: FAMILY MEDICINE

## 2018-11-08 PROCEDURE — 84443 ASSAY THYROID STIM HORMONE: CPT | Performed by: FAMILY MEDICINE

## 2018-11-08 PROCEDURE — 85025 COMPLETE CBC W/AUTO DIFF WBC: CPT | Performed by: FAMILY MEDICINE

## 2018-11-08 NOTE — PROGRESS NOTES
QUICK REFERENCE INFORMATION:  The ABCs of the Annual Wellness Visit    Subsequent Medicare Wellness Visit    HEALTH RISK ASSESSMENT    1945    Recent Hospitalizations:  No hospitalization(s) within the last year..        Current Medical Providers:  Patient Care Team:  Ike Cifuentes MD as PCP - General  Ike Cifuentes MD as PCP - Family Medicine        Smoking Status:  Social History     Tobacco Use   Smoking Status Never Smoker   Smokeless Tobacco Never Used       Alcohol Consumption:  Social History     Substance and Sexual Activity   Alcohol Use Yes   • Alcohol/week: 0.6 - 1.2 oz   • Types: 1 - 2 Glasses of wine per week    Comment: occasional wine       Depression Screen:   PHQ-2/PHQ-9 Depression Screening 11/12/2018   Little interest or pleasure in doing things 0   Feeling down, depressed, or hopeless 0   Total Score 0       Health Habits and Functional and Cognitive Screening:  No flowsheet data found.        Does the patient have evidence of cognitive impairment? No    Aspirin use counseling: Taking ASA appropriately as indicated      Recent Lab Results:  CMP:  Lab Results   Component Value Date     (H) 02/26/2015    BUN 15 11/08/2018    CREATININE 0.88 11/08/2018    EGFRIFNONA 63 11/08/2018    EGFRIFAFRI 77 02/26/2015    BCR 17.0 11/08/2018     11/08/2018    K 4.6 11/08/2018    CO2 26.0 11/08/2018    CALCIUM 10.0 11/08/2018    PROTENTOTREF 6.9 02/26/2015    ALBUMIN 5.09 (H) 11/08/2018    LABGLOBREF 2.0 02/26/2015    LABIL2 2.5 02/26/2015    BILITOT 0.4 11/08/2018    ALKPHOS 49 11/08/2018    AST 28 11/08/2018    ALT 51 (H) 11/08/2018     Lipid Panel:  Lab Results   Component Value Date    CHOL 224 (H) 11/08/2018    TRIG 151 (H) 11/08/2018    HDL 47 11/08/2018    VLDL 40 02/26/2015     HbA1c:  Lab Results   Component Value Date    HGBA1C 6.0 11/08/2018       Visual Acuity:  No exam data present    Age-appropriate Screening Schedule:  Refer to the list below for future screening  recommendations based on patient's age, sex and/or medical conditions. Orders for these recommended tests are listed in the plan section. The patient has been provided with a written plan.    Health Maintenance   Topic Date Due   • ZOSTER VACCINE (2 of 2) 12/30/2008   • TDAP/TD VACCINES (2 - Td) 08/15/2016   • URINE MICROALBUMIN  10/24/2018   • DIABETIC EYE EXAM  11/01/2018   • HEMOGLOBIN A1C  05/08/2019   • DIABETIC FOOT EXAM  11/08/2019   • LIPID PANEL  11/08/2019   • MAMMOGRAM  08/16/2020   • COLONOSCOPY  11/08/2028   • INFLUENZA VACCINE  Completed   • PNEUMOCOCCAL VACCINES (65+ LOW/MEDIUM RISK)  Completed        Subjective   History of Present Illness    Leila Pierre is a 73 y.o. female who presents for an Subsequent Wellness Visit.    The following portions of the patient's history were reviewed and updated as appropriate: allergies, current medications, past family history, past medical history, past social history, past surgical history and problem list.    Outpatient Medications Prior to Visit   Medication Sig Dispense Refill   • amLODIPine (NORVASC) 5 MG tablet TAKE ONE TABLET BY MOUTH TWICE A  tablet 1   • aspirin 81 MG EC tablet Take 81 mg by mouth Daily.     • azilsartan medoxomil (EDARBI) 80 MG tablet tablet Take 1 tablet by mouth Daily. 90 tablet 3   • baclofen (LIORESAL) 10 MG tablet Take 1 tablet by mouth 3 (Three) Times a Day As Needed for Muscle Spasms. 90 tablet 2   • BIOTIN 5000 PO Take 1 tablet by mouth Daily.     • Calcium Carbonate (CALCIUM 600 PO) Take 1 tablet by mouth Daily.     • carvedilol (COREG) 25 MG tablet TAKE ONE TABLET BY MOUTH TWICE A DAY WITH MEALS 180 tablet 3   • cholecalciferol (VITAMIN D3) 1000 UNITS tablet Take 1,000 Units by mouth Daily.     • CloNIDine (CATAPRES-TTS) 0.2 MG/24HR patch Place 1 patch on the skin 1 (One) Time Per Week. 12 patch 3   • famotidine (PEPCID) 20 MG tablet Take 20 mg by mouth 2 (Two) Times a Day.     • fexofenadine (ALLEGRA) 180 MG tablet  Take 180 mg by mouth Daily.     • fluocinonide (LIDEX) 0.05 % cream      • furosemide (LASIX) 20 MG tablet Take 1 tablet by mouth 2 (Two) Times a Day. 180 tablet 1   • levothyroxine (SYNTHROID, LEVOTHROID) 88 MCG tablet Take 88 mcg by mouth daily.     • metFORMIN (GLUCOPHAGE) 500 MG tablet Take 2 tablets by mouth 2 (Two) Times a Day With Meals. 360 tablet 1   • Omega-3 Fatty Acids (FISH OIL) 1200 MG capsule capsule Take 1,200 mg by mouth 2 (Two) Times a Day.     • potassium chloride (K-DUR,KLOR-CON) 10 MEQ CR tablet TAKE ONE TABLET BY MOUTH TWICE A  tablet 2   • rosuvastatin (CRESTOR) 5 MG tablet Take 5 mg by mouth Daily.       No facility-administered medications prior to visit.        Patient Active Problem List   Diagnosis   • Elevated alanine aminotransferase (ALT) level   • Hyperlipidemia   • Vitamin D deficiency   • Diabetes mellitus (CMS/HCC)   • Generalized osteoarthritis   • Eczema   • Essential hypertension   • Muscle spasm   • Mild obstructive sleep apnea   • Snoring       Advance Care Planning:  has an advance directive - a copy HAS NOT been provided    Identification of Risk Factors:  Risk factors include: cardiovascular risk.    Review of Systems   Constitutional: Negative for appetite change, diaphoresis, fatigue and unexpected weight change.   HENT: Negative for congestion and sore throat.    Eyes: Negative for visual disturbance.   Respiratory: Negative for cough, chest tightness and shortness of breath.    Cardiovascular: Negative for chest pain, palpitations and leg swelling.   Gastrointestinal: Negative for diarrhea, nausea and vomiting.   Endocrine: Negative for polydipsia, polyphagia and polyuria.   Genitourinary: Negative for dysuria and hematuria.   Musculoskeletal: Positive for arthralgias. Negative for myalgias.   Skin: Negative for color change and rash.   Neurological: Negative for dizziness, syncope, weakness, light-headedness, numbness and headaches.   Psychiatric/Behavioral:  "Negative.        Compared to one year ago, the patient feels her physical health is better.  Compared to one year ago, the patient feels her mental health is the same.    Objective     Physical Exam   Constitutional: She is oriented to person, place, and time. She appears well-developed and well-nourished. She is cooperative.   HENT:   Head: Normocephalic.   Right Ear: External ear normal.   Left Ear: External ear normal.   Nose: Nose normal.   Mouth/Throat: Oropharynx is clear and moist.   Eyes: Pupils are equal, round, and reactive to light. Conjunctivae are normal. No scleral icterus.   Neck: Neck supple. No JVD present. Carotid bruit is not present. No thyromegaly present.   Cardiovascular: Normal rate, regular rhythm and normal heart sounds.    Pulmonary/Chest: Effort normal and breath sounds normal.   Abdominal: Soft. Bowel sounds are normal. She exhibits no mass. There is no hepatosplenomegaly.   Musculoskeletal: Normal range of motion. She exhibits no edema.   Lymphadenopathy:     She has no cervical adenopathy.   Neurological: She is alert and oriented to person, place, and time.   No focal deficits no lateralizing signs   Skin: Skin is warm and dry. No rash noted.   Psychiatric: She has a normal mood and affect. Her behavior is normal. Cognition and memory are normal.   Nursing note and vitals reviewed.      Vitals:    11/08/18 0830   BP: 144/82   Pulse: 64   Resp: 16   Temp: 98 °F (36.7 °C)   SpO2: 98%   Weight: 79.3 kg (174 lb 12.8 oz)   Height: 165.1 cm (65\")       Patient's Body mass index is 29.09 kg/m². BMI is above normal parameters. Recommendations include: educational material.      Assessment/Plan   Patient Self-Management and Personalized Health Advice  The patient has been provided with information about: prevention of cardiac or vascular disease and preventive services including:   · Colorectal cancer screening, cologuard test ordered, Counseling for cardiovascular disease risk reduction, " Screening electrocardiogram.    Visit Diagnoses:    ICD-10-CM ICD-9-CM   1. Medicare annual wellness visit, subsequent Z00.00 V70.0   2. Type 2 diabetes mellitus without complication, without long-term current use of insulin (CMS/[facility]) E11.9 250.00   3. Essential hypertension I10 401.9   4. Familial hypercholesterolemia E78.01 272.0   5. Vitamin D deficiency E55.9 268.9   6. Screening for colon cancer Z12.11 V76.51       Orders Placed This Encounter   Procedures   • Comprehensive Metabolic Panel   • Lipid Panel   • TSH   • Vitamin D 25 Hydroxy   • Cologuard - Stool, Per Rectum     Standing Status:   Future     Number of Occurrences:   1     Standing Expiration Date:   11/8/2019   • CBC Auto Differential   • POC Glycosylated Hemoglobin (Hb A1C)   • POC Urinalysis Dipstick, Automated   • ECG 12 Lead     Order Specific Question:   Reason for Exam:     Answer:   htn   • SCANNED EKG   • CBC & Differential     Order Specific Question:   Manual Differential     Answer:   No       Outpatient Encounter Medications as of 11/8/2018   Medication Sig Dispense Refill   • amLODIPine (NORVASC) 5 MG tablet TAKE ONE TABLET BY MOUTH TWICE A  tablet 1   • aspirin 81 MG EC tablet Take 81 mg by mouth Daily.     • azilsartan medoxomil (EDARBI) 80 MG tablet tablet Take 1 tablet by mouth Daily. 90 tablet 3   • baclofen (LIORESAL) 10 MG tablet Take 1 tablet by mouth 3 (Three) Times a Day As Needed for Muscle Spasms. 90 tablet 2   • BIOTIN 5000 PO Take 1 tablet by mouth Daily.     • Calcium Carbonate (CALCIUM 600 PO) Take 1 tablet by mouth Daily.     • carvedilol (COREG) 25 MG tablet TAKE ONE TABLET BY MOUTH TWICE A DAY WITH MEALS 180 tablet 3   • cholecalciferol (VITAMIN D3) 1000 UNITS tablet Take 1,000 Units by mouth Daily.     • CloNIDine (CATAPRES-TTS) 0.2 MG/24HR patch Place 1 patch on the skin 1 (One) Time Per Week. 12 patch 3   • famotidine (PEPCID) 20 MG tablet Take 20 mg by mouth 2 (Two) Times a Day.     • fexofenadine  (ALLEGRA) 180 MG tablet Take 180 mg by mouth Daily.     • fluocinonide (LIDEX) 0.05 % cream      • furosemide (LASIX) 20 MG tablet Take 1 tablet by mouth 2 (Two) Times a Day. 180 tablet 1   • levothyroxine (SYNTHROID, LEVOTHROID) 88 MCG tablet Take 88 mcg by mouth daily.     • metFORMIN (GLUCOPHAGE) 500 MG tablet Take 2 tablets by mouth 2 (Two) Times a Day With Meals. 360 tablet 1   • Omega-3 Fatty Acids (FISH OIL) 1200 MG capsule capsule Take 1,200 mg by mouth 2 (Two) Times a Day.     • potassium chloride (K-DUR,KLOR-CON) 10 MEQ CR tablet TAKE ONE TABLET BY MOUTH TWICE A  tablet 2   • rosuvastatin (CRESTOR) 5 MG tablet Take 5 mg by mouth Daily.       No facility-administered encounter medications on file as of 11/8/2018.        Reviewed use of high risk medication in the elderly: yes  Reviewed for potential of harmful drug interactions in the elderly: not applicable    Follow Up:  Return in about 4 months (around 3/8/2019) for Recheck.     An After Visit Summary and PPPS with all of these plans were given to the patient.        ECG 12 Lead  Date/Time: 11/8/2018 4:27 PM  Performed by: JAMES SINGH  Authorized by: JAMES SINGH   Comparison: compared with previous ECG   Similar to previous ECG  Rhythm: sinus rhythm  Rate: normal  BPM: 62  Conduction: conduction normal  ST Segments: ST segments normal  T Waves: T waves normal  QRS axis: left  Other: no other findings  Clinical impression: normal ECG  Comments: HTN          See form

## 2018-11-29 ENCOUNTER — TELEPHONE (OUTPATIENT)
Dept: FAMILY MEDICINE CLINIC | Facility: CLINIC | Age: 73
End: 2018-11-29

## 2018-11-29 NOTE — TELEPHONE ENCOUNTER
Called informed pt that I resent the fax.  BOB POWELL    ----- Message from Angelika Moya sent at 11/29/2018  2:01 PM EST -----  Regarding: PLEASE CALL   Contact: [phone]  PLEASE CALL REGARDING [facility] SHE NEVER RECEIVED

## 2018-12-28 RX ORDER — CLONIDINE 0.2 MG/24H
PATCH, EXTENDED RELEASE TRANSDERMAL
Qty: 12 PATCH | Refills: 3 | Status: SHIPPED | OUTPATIENT
Start: 2018-12-28 | End: 2019-03-19

## 2019-01-18 DIAGNOSIS — I10 HTN (HYPERTENSION), MALIGNANT: ICD-10-CM

## 2019-01-18 DIAGNOSIS — E78.5 DYSLIPIDEMIA: ICD-10-CM

## 2019-01-18 RX ORDER — AZILSARTAN KAMEDOXOMIL 80 MG/1
TABLET ORAL
Qty: 90 TABLET | Refills: 3 | Status: SHIPPED | OUTPATIENT
Start: 2019-01-18 | End: 2019-12-27

## 2019-02-11 RX ORDER — AMLODIPINE BESYLATE 5 MG/1
TABLET ORAL
Qty: 180 TABLET | Refills: 0 | Status: SHIPPED | OUTPATIENT
Start: 2019-02-11 | End: 2019-04-08 | Stop reason: SDUPTHER

## 2019-02-12 RX ORDER — FUROSEMIDE 20 MG/1
20 TABLET ORAL
Qty: 180 TABLET | Refills: 1 | Status: SHIPPED | OUTPATIENT
Start: 2019-02-12 | End: 2019-08-08 | Stop reason: SDUPTHER

## 2019-03-14 ENCOUNTER — OFFICE VISIT (OUTPATIENT)
Dept: FAMILY MEDICINE CLINIC | Facility: CLINIC | Age: 74
End: 2019-03-14

## 2019-03-14 VITALS
DIASTOLIC BLOOD PRESSURE: 72 MMHG | OXYGEN SATURATION: 98 % | HEIGHT: 65 IN | HEART RATE: 67 BPM | RESPIRATION RATE: 16 BRPM | BODY MASS INDEX: 29.12 KG/M2 | WEIGHT: 174.8 LBS | SYSTOLIC BLOOD PRESSURE: 110 MMHG

## 2019-03-14 DIAGNOSIS — E78.01 FAMILIAL HYPERCHOLESTEROLEMIA: ICD-10-CM

## 2019-03-14 DIAGNOSIS — E11.9 TYPE 2 DIABETES MELLITUS WITHOUT COMPLICATION, WITHOUT LONG-TERM CURRENT USE OF INSULIN (HCC): Primary | ICD-10-CM

## 2019-03-14 DIAGNOSIS — I10 ESSENTIAL HYPERTENSION: ICD-10-CM

## 2019-03-14 LAB — HBA1C MFR BLD: 6.2 %

## 2019-03-14 PROCEDURE — 83036 HEMOGLOBIN GLYCOSYLATED A1C: CPT | Performed by: FAMILY MEDICINE

## 2019-03-14 PROCEDURE — 99213 OFFICE O/P EST LOW 20 MIN: CPT | Performed by: FAMILY MEDICINE

## 2019-03-14 RX ORDER — ZOSTER VACCINE RECOMBINANT, ADJUVANTED 50 MCG/0.5
KIT INTRAMUSCULAR
COMMUNITY
Start: 2018-12-31 | End: 2019-03-19

## 2019-03-14 RX ORDER — BACLOFEN 10 MG/1
10 TABLET ORAL 3 TIMES DAILY PRN
Qty: 90 TABLET | Refills: 2 | Status: SHIPPED | OUTPATIENT
Start: 2019-03-14 | End: 2020-08-03

## 2019-03-15 NOTE — PROGRESS NOTES
Subjective   Leila Pierre is a 73 y.o. female.     Diabetes   She presents for her follow-up diabetic visit. She has type 2 diabetes mellitus. Her disease course has been improving. Pertinent negatives for hypoglycemia include no dizziness or headaches. Pertinent negatives for diabetes include no chest pain, no fatigue, no polydipsia, no polyphagia, no polyuria, no visual change and no weakness. There are no hypoglycemic complications. Symptoms are improving. There are no diabetic complications. Current diabetic treatment includes oral agent (monotherapy). She is compliant with treatment most of the time. An ACE inhibitor/angiotensin II receptor blocker is being taken. Eye exam is current.   Hypertension   This is a chronic problem. The current episode started more than 1 year ago. The problem is controlled. Pertinent negatives include no chest pain, headaches, palpitations, peripheral edema or shortness of breath. Risk factors for coronary artery disease include post-menopausal state, sedentary lifestyle and stress. Current antihypertension treatment includes central alpha agonists, calcium channel blockers, angiotensin blockers, beta blockers and diuretics. The current treatment provides moderate improvement. There are no compliance problems.  There is no history of angina, kidney disease, CAD/MI or left ventricular hypertrophy.   Hyperlipidemia   This is a chronic problem. The current episode started more than 1 year ago. The problem is controlled. Exacerbating diseases include diabetes. There are no known factors aggravating her hyperlipidemia. Pertinent negatives include no chest pain, myalgias or shortness of breath. Current antihyperlipidemic treatment includes statins (not currently taking crestor). The current treatment provides significant improvement of lipids. Compliance problems include medication side effects.  Risk factors for coronary artery disease include hypertension, post-menopausal, a  "sedentary lifestyle and diabetes mellitus.        The following portions of the patient's history were reviewed and updated as appropriate: allergies, current medications, past social history and problem list.    Review of Systems   Constitutional: Negative for appetite change, diaphoresis, fatigue and unexpected weight change.   HENT: Negative for congestion and sore throat.    Eyes: Negative for visual disturbance.   Respiratory: Negative for cough, chest tightness and shortness of breath.    Cardiovascular: Negative for chest pain, palpitations and leg swelling.   Gastrointestinal: Negative for diarrhea, nausea and vomiting.   Endocrine: Negative for polydipsia, polyphagia and polyuria.   Genitourinary: Negative for dysuria and hematuria.   Musculoskeletal: Positive for arthralgias. Negative for myalgias.   Skin: Negative for color change and rash.   Neurological: Negative for dizziness, syncope, weakness, light-headedness, numbness and headaches.       Objective   /72   Pulse 67   Resp 16   Ht 165.1 cm (65\")   Wt 79.3 kg (174 lb 12.8 oz)   LMP  (LMP Unknown)   SpO2 98%   BMI 29.09 kg/m²   Physical Exam   Constitutional: She is oriented to person, place, and time. She appears well-developed and well-nourished. She is cooperative.   HENT:   Head: Normocephalic.   Right Ear: External ear normal.   Left Ear: External ear normal.   Nose: Nose normal.   Mouth/Throat: Oropharynx is clear and moist.   Eyes: Conjunctivae are normal. Pupils are equal, round, and reactive to light. No scleral icterus.   Neck: Neck supple. No JVD present. Carotid bruit is not present. No thyromegaly present.   Cardiovascular: Normal rate, regular rhythm, normal heart sounds and intact distal pulses.   Pulmonary/Chest: Effort normal and breath sounds normal.   Abdominal: There is no hepatosplenomegaly.   Musculoskeletal: Normal range of motion. She exhibits edema.   Neurological: She is alert and oriented to person, place, and " time.   No focal deficits no lateralizing signs   Skin: Skin is warm and dry. No rash noted.   Psychiatric: She has a normal mood and affect. Cognition and memory are normal.   Nursing note and vitals reviewed.      Assessment/Plan   Problem List Items Addressed This Visit        Active Problems    Hyperlipidemia    Diabetes mellitus (CMS/[facility]) - Primary    Relevant Orders    POC Glycosylated Hemoglobin (Hb A1C) (Completed)    Essential hypertension          New Medications Ordered This Visit   Medications   • baclofen (LIORESAL) 10 MG tablet     Sig: Take 1 tablet by mouth 3 (Three) Times a Day As Needed for Muscle Spasms.     Dispense:  90 tablet     Refill:  2

## 2019-03-19 ENCOUNTER — OFFICE VISIT (OUTPATIENT)
Dept: CARDIOLOGY | Facility: CLINIC | Age: 74
End: 2019-03-19

## 2019-03-19 VITALS
HEART RATE: 77 BPM | BODY MASS INDEX: 28.99 KG/M2 | WEIGHT: 174 LBS | SYSTOLIC BLOOD PRESSURE: 117 MMHG | HEIGHT: 65 IN | DIASTOLIC BLOOD PRESSURE: 72 MMHG

## 2019-03-19 DIAGNOSIS — I10 ESSENTIAL HYPERTENSION: ICD-10-CM

## 2019-03-19 DIAGNOSIS — E78.5 HYPERLIPIDEMIA, UNSPECIFIED HYPERLIPIDEMIA TYPE: Primary | ICD-10-CM

## 2019-03-19 PROCEDURE — 99213 OFFICE O/P EST LOW 20 MIN: CPT | Performed by: INTERNAL MEDICINE

## 2019-03-19 RX ORDER — RANITIDINE 150 MG/1
150 TABLET ORAL 2 TIMES DAILY
COMMUNITY
End: 2020-06-16

## 2019-03-19 NOTE — PROGRESS NOTES
"  OFFICE FOLLOW UP     Date of Encounter:2019     Name: Leila Pierre  : 1945  Address: [address] [facility] [address]  Home Phone: [phone]    PCP: Ike Cifuentes MD  [address]  [facility] [address]    Leila Pierre is a 73 y.o. female.      Chief Complaint: Follow up of HTN    Problem List:   1. Essential hypertension  a. Onset approximate age 32  b. Renal artery duplex normal with no evidence of PAMELA (10/17/2016)  c. Negative pheo evaluation  2. Dyslipidemia, with multiple statin intolerances  3. Pre-diabetes  4. Hypothyroidism  5. Obstructive Sleep Apnea  a. Using oral appliance, \"snore guard\"    Allergies:  Allergies   Allergen Reactions   • Clarithromycin    • Decongest-Aid [Pseudoephedrine]      Gives too much energy and patient is unable to sleep   • Irbesartan    • Lescol [Fluvastatin Sodium] Diarrhea   • Lipitor [Atorvastatin] Myalgia   • Other      bandaids    • Sulfa Antibiotics    • Valsartan        Current Medications:  •  amLODIPine (NORVASC) 5 MG tablet, TAKE ONE TABLET BY MOUTH TWICE A DAY  •  aspirin 81 MG EC tablet, Take 81 mg by mouth Daily  •  baclofen 10 mg by mouth As Needed for Muscle Spasms  •  BIOTIN 5000 PO, Take 1 tablet by mouth Daily  •  Calcium Carbonate (CALCIUM 600 PO), Take 1 tablet by mouth Daily.  •  carvedilol (COREG) 25 MG tablet, TAKE ONE TABLET BY MOUTH TWICE A DAY WITH MEALS  •  cholecalciferol (VITAMIN D3) 1000 UNITS tablet, Take 1,000 Units by mouth Daily  •  CloNIDine (CATAPRES-TTS) 0.2 MG/24HR patch on the skin 1 (One) Time Per Week  •  EDARBI 80 MG tablet tablet, TAKE 1 TABLET DAILY  •  fexofenadine (ALLEGRA) 180 MG tablet, Take 180 mg by mouth Daily.  •  furosemide (LASIX) 20 MG by mouth 2 (Two) Times a Day  •  levothyroxine 88 MCG tablet, Take 88 mcg by mouth daily.  •  metFORMIN 500 MG, Take 2 tablets by mouth 2 (Two) Times a Day With Meals.  •  potassium chloride (K-DUR,KLOR-CON) 10 MEQ CR BY MOUTH TWICE A DAY  • " " raNITIdine 150 mg by mouth 2 (Two) Times a Day    History of Present Illness:   She has retired with her .  She is active.  She is asymptomatic.   Both office and home BPs are noted to be \"at target\".           The following portions of the patient's history were reviewed and updated as appropriate: allergies, current medications and problem list.    ROS: Pertinent positives as listed in the HPI.  All other systems reviewed and negative.    Objective:    Vitals:    03/19/19 1053 03/19/19 1054   BP: 134/85 117/72   BP Location: Left arm Left arm   Patient Position: Sitting Standing   Pulse: 74 77   Weight: 78.9 kg (174 lb)    Height: 165.1 cm (65\")        Physical Exam:  GENERAL: Alert, cooperative, in no acute distress.   HEENT: Normocephalic, no adenopathy, no jugular venous distention  HEART: No discrete PMI is noted. Regular rhythm, normal rate, and no murmurs, gallops, or rubs.   LUNGS: Clear to auscultation bilaterally. No wheezing, rales or ronchi.  ABDOMEN: Soft, bowel sounds present, non-tender   NEUROLOGIC: No focal abnormalities involving strength or sensation are noted.   EXTREMITIES: No clubbing, cyanosis, or edema noted.     Diagnostic Data:    Lab Results   Component Value Date    HGBA1C 6.2 03/14/2019     Lab Results   Component Value Date    CHOL 224 (H) 11/08/2018    TRIG 151 (H) 11/08/2018    HDL 47 11/08/2018     (H) 11/08/2018     Lab Results   Component Value Date    TSH 2.901 11/08/2018      Lab Results   Component Value Date    GLUCOSE 119 (H) 11/08/2018    BUN 15 11/08/2018    CREATININE 0.88 11/08/2018    EGFRIFNONA 63 11/08/2018    BCR 17.0 11/08/2018    K 4.6 11/08/2018    CO2 26.0 11/08/2018    CALCIUM 10.0 11/08/2018    ALBUMIN 5.09 (H) 11/08/2018    AST 28 11/08/2018    ALT 51 (H) 11/08/2018     Procedures      Assessment and Plan:   1.  HTN: She is at the current AHA target on a multiple medicine formulary.  She is not having side effects from medicine.  She can " "follow-up with Earle Cifuentes MD and see me as needed in the future.  She understands this.  3.  HLD: I discussed this with the patient.  She does not have a diagnosis of atherosclerotic disease but is \"at risk\" is noted in the problem list above.  She has tried multiple statins and has been \"intolerant\" to all.  I have nothing else to offer in this regard.  In the absence of symptomatic atherosclerotic disease, I  do not think that we would be able to obtain a PC SK 9 inhibitor.  I discussed this with her frankly.    I, Uziel Meza MD, [facility], [facility], personally performed the services described in this documentation as scribed by the above named individual in my presence, and it is both accurate and complete. At 11:12 AM on 03/19/2019    I will see Leila Pierre back on an as needed basis.        Scribed for Uziel Meza MD by Keke Manning RN. 03/19/2019 10:57 AM.        EMR Dragon/Transcription Disclaimer:  Much of this encounter note is an electronic transcription/translation of spoken language to printed text.  The electronic translation of spoken language may permit erroneous, or at times, nonsensical words or phrases to be inadvertently transcribed.  Although I have reviewed the note for such errors, some may still exist.             "

## 2019-04-08 RX ORDER — AMLODIPINE BESYLATE 5 MG/1
TABLET ORAL
Qty: 180 TABLET | Refills: 3 | Status: SHIPPED | OUTPATIENT
Start: 2019-04-08 | End: 2020-04-16 | Stop reason: SDUPTHER

## 2019-06-27 ENCOUNTER — OFFICE VISIT (OUTPATIENT)
Dept: FAMILY MEDICINE CLINIC | Facility: CLINIC | Age: 74
End: 2019-06-27

## 2019-06-27 VITALS
HEIGHT: 65 IN | OXYGEN SATURATION: 97 % | DIASTOLIC BLOOD PRESSURE: 70 MMHG | HEART RATE: 65 BPM | SYSTOLIC BLOOD PRESSURE: 116 MMHG | WEIGHT: 173.2 LBS | BODY MASS INDEX: 28.86 KG/M2 | RESPIRATION RATE: 16 BRPM

## 2019-06-27 DIAGNOSIS — E11.9 TYPE 2 DIABETES MELLITUS WITHOUT COMPLICATION, WITHOUT LONG-TERM CURRENT USE OF INSULIN (HCC): Primary | ICD-10-CM

## 2019-06-27 DIAGNOSIS — E78.01 FAMILIAL HYPERCHOLESTEROLEMIA: ICD-10-CM

## 2019-06-27 DIAGNOSIS — I10 ESSENTIAL HYPERTENSION: ICD-10-CM

## 2019-06-27 LAB
ALBUMIN SERPL-MCNC: 4.8 G/DL (ref 3.5–5.2)
ALBUMIN/GLOB SERPL: 1.8 G/DL
ALP SERPL-CCNC: 43 U/L (ref 39–117)
ALT SERPL W P-5'-P-CCNC: 33 U/L (ref 1–33)
ANION GAP SERPL CALCULATED.3IONS-SCNC: 14.6 MMOL/L (ref 5–15)
AST SERPL-CCNC: 23 U/L (ref 1–32)
BILIRUB SERPL-MCNC: 0.4 MG/DL (ref 0.2–1.2)
BUN BLD-MCNC: 15 MG/DL (ref 8–23)
BUN/CREAT SERPL: 16.9 (ref 7–25)
CALCIUM SPEC-SCNC: 10.2 MG/DL (ref 8.6–10.5)
CHLORIDE SERPL-SCNC: 102 MMOL/L (ref 98–107)
CHOLEST SERPL-MCNC: 203 MG/DL (ref 0–200)
CO2 SERPL-SCNC: 23.4 MMOL/L (ref 22–29)
CREAT BLD-MCNC: 0.89 MG/DL (ref 0.57–1)
EXPIRATION DATE 2: NORMAL
GFR SERPL CREATININE-BSD FRML MDRD: 62 ML/MIN/1.73
GLOBULIN UR ELPH-MCNC: 2.7 GM/DL
GLUCOSE BLD-MCNC: 109 MG/DL (ref 65–99)
HBA1C MFR BLD: 5.9 %
HDLC SERPL-MCNC: 38 MG/DL (ref 40–60)
LDLC SERPL CALC-MCNC: 133 MG/DL (ref 0–100)
LDLC/HDLC SERPL: 3.49 {RATIO}
Lab: NORMAL
POTASSIUM BLD-SCNC: 4.6 MMOL/L (ref 3.5–5.2)
PROT SERPL-MCNC: 7.5 G/DL (ref 6–8.5)
SODIUM BLD-SCNC: 140 MMOL/L (ref 136–145)
TRIGL SERPL-MCNC: 161 MG/DL (ref 0–150)
VLDLC SERPL-MCNC: 32.2 MG/DL (ref 5–40)

## 2019-06-27 PROCEDURE — 80053 COMPREHEN METABOLIC PANEL: CPT | Performed by: FAMILY MEDICINE

## 2019-06-27 PROCEDURE — 99214 OFFICE O/P EST MOD 30 MIN: CPT | Performed by: FAMILY MEDICINE

## 2019-06-27 PROCEDURE — 36415 COLL VENOUS BLD VENIPUNCTURE: CPT | Performed by: FAMILY MEDICINE

## 2019-06-27 PROCEDURE — 83036 HEMOGLOBIN GLYCOSYLATED A1C: CPT | Performed by: FAMILY MEDICINE

## 2019-06-27 PROCEDURE — 80061 LIPID PANEL: CPT | Performed by: FAMILY MEDICINE

## 2019-06-27 RX ORDER — CLONIDINE 0.2 MG/24H
0.2 PATCH, EXTENDED RELEASE TRANSDERMAL WEEKLY
COMMUNITY
Start: 2019-06-14 | End: 2019-12-10 | Stop reason: SDUPTHER

## 2019-06-27 NOTE — PROGRESS NOTES
Subjective   Leila Pierre is a 74 y.o. female.     Hyperlipidemia   This is a chronic problem. The current episode started more than 1 year ago. The problem is controlled. Exacerbating diseases include diabetes. There are no known factors aggravating her hyperlipidemia. Pertinent negatives include no chest pain, myalgias or shortness of breath. Current antihyperlipidemic treatment includes statins (not currently taking crestor). The current treatment provides significant improvement of lipids. Compliance problems include medication side effects.  Risk factors for coronary artery disease include hypertension, post-menopausal, a sedentary lifestyle and diabetes mellitus.   Diabetes   She presents for her follow-up diabetic visit. She has type 2 diabetes mellitus. Her disease course has been stable. There are no hypoglycemic associated symptoms. Pertinent negatives for hypoglycemia include no dizziness or headaches. Pertinent negatives for diabetes include no chest pain, no fatigue, no polydipsia, no polyphagia, no polyuria, no visual change and no weakness. There are no hypoglycemic complications. Symptoms are improving. There are no diabetic complications. Risk factors for coronary artery disease include obesity and post-menopausal. Current diabetic treatment includes oral agent (monotherapy). She is compliant with treatment most of the time. An ACE inhibitor/angiotensin II receptor blocker is being taken. Eye exam is current.   Hypertension   This is a chronic problem. The current episode started more than 1 year ago. The problem is controlled. Pertinent negatives include no chest pain, headaches, palpitations, peripheral edema or shortness of breath. Risk factors for coronary artery disease include post-menopausal state, sedentary lifestyle and stress. Current antihypertension treatment includes central alpha agonists, calcium channel blockers, angiotensin blockers, beta blockers and diuretics. The current  "treatment provides moderate improvement. There are no compliance problems.  There is no history of angina, kidney disease, CAD/MI or left ventricular hypertrophy.        The following portions of the patient's history were reviewed and updated as appropriate: allergies, current medications, past social history and problem list.    Review of Systems   Constitutional: Negative for activity change, appetite change, diaphoresis, fatigue and unexpected weight change.   Eyes: Negative for pain and visual disturbance.   Respiratory: Negative for chest tightness and shortness of breath.    Cardiovascular: Positive for leg swelling. Negative for chest pain and palpitations.   Gastrointestinal: Negative for diarrhea, nausea and vomiting.   Endocrine: Negative for polydipsia, polyphagia and polyuria.   Genitourinary: Negative for dysuria and hematuria.   Musculoskeletal: Positive for arthralgias. Negative for myalgias.   Skin: Negative for color change.   Neurological: Negative for dizziness, syncope, weakness, light-headedness, numbness and headaches.       Objective   /70   Pulse 65   Resp 16   Ht 165.1 cm (65\")   Wt 78.6 kg (173 lb 3.2 oz)   LMP  (LMP Unknown)   SpO2 97%   BMI 28.82 kg/m²   Physical Exam   Constitutional: She is oriented to person, place, and time. She appears well-developed and well-nourished. She is cooperative.   HENT:   Head: Normocephalic.   Right Ear: External ear normal.   Left Ear: External ear normal.   Nose: Nose normal.   Mouth/Throat: Oropharynx is clear and moist.   Eyes: Conjunctivae are normal. Pupils are equal, round, and reactive to light. No scleral icterus.   Neck: Neck supple. No JVD present. Carotid bruit is not present. No thyromegaly present.   Cardiovascular: Normal rate, regular rhythm, normal heart sounds and intact distal pulses.   Pulmonary/Chest: Effort normal and breath sounds normal. She has no wheezes. She has no rales.   Abdominal: There is no " hepatosplenomegaly.   Musculoskeletal: Normal range of motion. She exhibits edema.   Trace to 1+   Neurological: She is alert and oriented to person, place, and time.   No focal deficits no lateralizing signs   Skin: Skin is warm and dry. No rash noted.   Psychiatric: She has a normal mood and affect. Cognition and memory are normal.   Nursing note and vitals reviewed.      Assessment/Plan   Problem List Items Addressed This Visit        Active Problems    Hyperlipidemia    Relevant Orders    Comprehensive Metabolic Panel    Lipid Panel    Diabetes mellitus (CMS/HCC) - Primary    Relevant Medications    metFORMIN (GLUCOPHAGE) 500 MG tablet    Other Relevant Orders    POC Glycosylated Hemoglobin (Hb A1C) (Completed)    Comprehensive Metabolic Panel    Lipid Panel    Essential hypertension    Relevant Medications    cloNIDine (CATAPRES-TTS) 0.2 MG/24HR patch    Other Relevant Orders    Comprehensive Metabolic Panel          New Medications Ordered This Visit   Medications   • metFORMIN (GLUCOPHAGE) 500 MG tablet     Sig: Take 2 tablets by mouth 2 (Two) Times a Day With Meals.     Dispense:  360 tablet     Refill:  3

## 2019-07-15 ENCOUNTER — TRANSCRIBE ORDERS (OUTPATIENT)
Dept: ADMINISTRATIVE | Facility: HOSPITAL | Age: 74
End: 2019-07-15

## 2019-07-15 DIAGNOSIS — Z12.31 VISIT FOR SCREENING MAMMOGRAM: Primary | ICD-10-CM

## 2019-07-16 ENCOUNTER — OFFICE VISIT (OUTPATIENT)
Dept: FAMILY MEDICINE CLINIC | Facility: CLINIC | Age: 74
End: 2019-07-16

## 2019-07-16 ENCOUNTER — TELEPHONE (OUTPATIENT)
Dept: FAMILY MEDICINE CLINIC | Facility: CLINIC | Age: 74
End: 2019-07-16

## 2019-07-16 VITALS
DIASTOLIC BLOOD PRESSURE: 80 MMHG | RESPIRATION RATE: 16 BRPM | SYSTOLIC BLOOD PRESSURE: 118 MMHG | WEIGHT: 175.8 LBS | BODY MASS INDEX: 29.29 KG/M2 | HEIGHT: 65 IN | OXYGEN SATURATION: 98 % | HEART RATE: 73 BPM

## 2019-07-16 DIAGNOSIS — H83.90 INNER EAR DYSFUNCTION, UNSPECIFIED LATERALITY: Primary | ICD-10-CM

## 2019-07-16 PROCEDURE — 99213 OFFICE O/P EST LOW 20 MIN: CPT | Performed by: FAMILY MEDICINE

## 2019-07-16 RX ORDER — MECLIZINE HYDROCHLORIDE 25 MG/1
25 TABLET ORAL 3 TIMES DAILY PRN
Qty: 30 TABLET | Refills: 0 | Status: SHIPPED | OUTPATIENT
Start: 2019-07-16 | End: 2019-07-23 | Stop reason: SDUPTHER

## 2019-07-16 NOTE — PROGRESS NOTES
"Subjective   Leila Pierre is a 74 y.o. female.     Dizziness   This is a new problem. The current episode started yesterday. The problem occurs intermittently. The problem has been unchanged. Associated symptoms include vertigo. Pertinent negatives include no chest pain, chills, congestion, coughing, fever, headaches, nausea, sore throat or vomiting. The symptoms are aggravated by bending. She has tried relaxation for the symptoms. The treatment provided no relief.      Symptoms worse with change in head position, noticed it was the worst when she got out of bed to go to the bathroom.  The following portions of the patient's history were reviewed and updated as appropriate: allergies, current medications, past social history and problem list.    Review of Systems   Constitutional: Negative for chills and fever.   HENT: Negative for congestion and sore throat.    Respiratory: Negative for cough and shortness of breath.    Cardiovascular: Negative for chest pain and palpitations.   Gastrointestinal: Negative for nausea and vomiting.   Neurological: Positive for dizziness and vertigo. Negative for tremors, syncope, facial asymmetry and headaches.       Objective   /80   Pulse 73   Resp 16   Ht 165.1 cm (65\")   Wt 79.7 kg (175 lb 12.8 oz)   LMP  (LMP Unknown)   SpO2 98%   BMI 29.25 kg/m²   Physical Exam   Constitutional: She is oriented to person, place, and time. She appears well-developed and well-nourished.   HENT:   Head: Normocephalic and atraumatic.   Right Ear: External ear normal.   Left Ear: External ear normal.   Nose: Nose normal.   Mouth/Throat: Oropharynx is clear and moist.   Eyes: Conjunctivae and EOM are normal. Pupils are equal, round, and reactive to light.   Neck: Neck supple.   Cardiovascular: Normal rate and regular rhythm.   Pulmonary/Chest: Effort normal and breath sounds normal.   Musculoskeletal: Normal range of motion.   Neurological: She is alert and oriented to person, place, " and time. She displays normal reflexes. No cranial nerve deficit or sensory deficit. She exhibits normal muscle tone. Coordination normal.   Nursing note and vitals reviewed.      Assessment/Plan   Problem List Items Addressed This Visit     None      Visit Diagnoses     Inner ear dysfunction, unspecified laterality    -  Primary          New Medications Ordered This Visit   Medications   • meclizine (ANTIVERT) 25 MG tablet     Sig: Take 1 tablet by mouth 3 (Three) Times a Day As Needed for dizziness.     Dispense:  30 tablet     Refill:  0     Reduce head   Movement increase hydration return to clinic if symptoms persist or worsen we discussed steroid but she declined steroid treatment at this time.

## 2019-07-16 NOTE — TELEPHONE ENCOUNTER
Spoke with pharmacy technician, they've already filled the Rx.  BOB POWELL    ----- Message from Snow Hoover sent at 7/16/2019  2:00 PM EDT -----  Regarding: MEDICATION  Contact: [phone]  PRESRIPTION WILL HAVE TO BE CALLED IN DUE TO THE COMPUTER BEING DOWN AT THE [facility] ON [facility], PLEASE LET HER KNOW.

## 2019-07-23 ENCOUNTER — OFFICE VISIT (OUTPATIENT)
Dept: FAMILY MEDICINE CLINIC | Facility: CLINIC | Age: 74
End: 2019-07-23

## 2019-07-23 VITALS
HEIGHT: 65 IN | WEIGHT: 175.8 LBS | BODY MASS INDEX: 29.29 KG/M2 | OXYGEN SATURATION: 97 % | RESPIRATION RATE: 16 BRPM | SYSTOLIC BLOOD PRESSURE: 124 MMHG | DIASTOLIC BLOOD PRESSURE: 82 MMHG | HEART RATE: 73 BPM

## 2019-07-23 DIAGNOSIS — H83.90 INNER EAR DYSFUNCTION, UNSPECIFIED LATERALITY: Primary | ICD-10-CM

## 2019-07-23 PROCEDURE — 96372 THER/PROPH/DIAG INJ SC/IM: CPT | Performed by: FAMILY MEDICINE

## 2019-07-23 PROCEDURE — 99213 OFFICE O/P EST LOW 20 MIN: CPT | Performed by: FAMILY MEDICINE

## 2019-07-23 RX ORDER — DEXAMETHASONE SODIUM PHOSPHATE 4 MG/ML
4 INJECTION, SOLUTION INTRA-ARTICULAR; INTRALESIONAL; INTRAMUSCULAR; INTRAVENOUS; SOFT TISSUE ONCE
Status: COMPLETED | OUTPATIENT
Start: 2019-07-23 | End: 2019-07-23

## 2019-07-23 RX ORDER — MECLIZINE HYDROCHLORIDE 25 MG/1
25 TABLET ORAL 3 TIMES DAILY PRN
Qty: 30 TABLET | Refills: 0 | Status: SHIPPED | OUTPATIENT
Start: 2019-07-23 | End: 2020-09-29

## 2019-07-23 RX ADMIN — DEXAMETHASONE SODIUM PHOSPHATE 4 MG: 4 INJECTION, SOLUTION INTRA-ARTICULAR; INTRALESIONAL; INTRAMUSCULAR; INTRAVENOUS; SOFT TISSUE at 11:50

## 2019-07-23 NOTE — PROGRESS NOTES
"Subjective   Leila Pierre is a 74 y.o. female.     Dizziness   This is a new (no worse no better) problem. The current episode started 1 to 4 weeks ago. The problem occurs constantly. The problem has been unchanged. Pertinent negatives include no chest pain, chills, congestion, coughing, fever, headaches or sore throat. The symptoms are aggravated by bending. She has tried rest and drinking for the symptoms. The treatment provided no relief.        The following portions of the patient's history were reviewed and updated as appropriate: allergies, current medications, past social history and problem list.    Review of Systems   Constitutional: Negative for chills and fever.   HENT: Negative for congestion, ear discharge, ear pain and sore throat.    Respiratory: Negative for cough and shortness of breath.    Cardiovascular: Negative for chest pain and palpitations.   Genitourinary: Negative for dysuria and hematuria.   Neurological: Positive for dizziness. Negative for headaches.       Objective   /82   Pulse 73   Resp 16   Ht 165.1 cm (65\")   Wt 79.7 kg (175 lb 12.8 oz)   LMP  (LMP Unknown)   SpO2 97%   BMI 29.25 kg/m²   Physical Exam   Constitutional: She is oriented to person, place, and time. She appears well-developed and well-nourished. She is cooperative.   HENT:   Head: Normocephalic.   Right Ear: External ear normal.   Left Ear: External ear normal.   Nose: Nose normal.   Mouth/Throat: Oropharynx is clear and moist.   TMs normal   Eyes: Conjunctivae and EOM are normal. Pupils are equal, round, and reactive to light. No scleral icterus.   Neck: Neck supple. Carotid bruit is not present. No thyromegaly present.   Cardiovascular: Normal rate, regular rhythm and normal heart sounds.   Pulmonary/Chest: Effort normal and breath sounds normal.   Abdominal: There is no hepatosplenomegaly.   Musculoskeletal: Normal range of motion.   Neurological: She is alert and oriented to person, place, and " time. She displays normal reflexes. No cranial nerve deficit. Coordination normal.   No focal deficits no lateralizing signs   Skin: Skin is warm and dry. No rash noted.   Psychiatric: She has a normal mood and affect. Cognition and memory are normal.   Nursing note and vitals reviewed.      Assessment/Plan   Problem List Items Addressed This Visit     None      Visit Diagnoses     Inner ear dysfunction, unspecified laterality    -  Primary    Relevant Medications    meclizine (ANTIVERT) 25 MG tablet    dexamethasone (DECADRON) injection 4 mg (Completed)          New Medications Ordered This Visit   Medications   • meclizine (ANTIVERT) 25 MG tablet     Sig: Take 1 tablet by mouth 3 (Three) Times a Day As Needed for dizziness.     Dispense:  30 tablet     Refill:  0   • dexamethasone (DECADRON) injection 4 mg     [facility] therapy evaluation for vestibular rehab.  Consider ENT evaluation if no better in 1 week.

## 2019-08-08 RX ORDER — FUROSEMIDE 20 MG/1
TABLET ORAL
Qty: 180 TABLET | Refills: 1 | Status: SHIPPED | OUTPATIENT
Start: 2019-08-08 | End: 2020-02-07

## 2019-08-29 RX ORDER — POTASSIUM CHLORIDE 750 MG/1
TABLET, EXTENDED RELEASE ORAL
Qty: 180 TABLET | Refills: 1 | Status: SHIPPED | OUTPATIENT
Start: 2019-08-29 | End: 2020-02-21

## 2019-09-25 ENCOUNTER — OFFICE VISIT (OUTPATIENT)
Dept: FAMILY MEDICINE CLINIC | Facility: CLINIC | Age: 74
End: 2019-09-25

## 2019-09-25 VITALS
SYSTOLIC BLOOD PRESSURE: 128 MMHG | OXYGEN SATURATION: 98 % | RESPIRATION RATE: 16 BRPM | DIASTOLIC BLOOD PRESSURE: 82 MMHG | HEART RATE: 68 BPM | BODY MASS INDEX: 29.59 KG/M2 | WEIGHT: 177.6 LBS | HEIGHT: 65 IN

## 2019-09-25 DIAGNOSIS — E11.9 TYPE 2 DIABETES MELLITUS WITHOUT COMPLICATION, WITHOUT LONG-TERM CURRENT USE OF INSULIN (HCC): Primary | ICD-10-CM

## 2019-09-25 DIAGNOSIS — I10 ESSENTIAL HYPERTENSION: ICD-10-CM

## 2019-09-25 DIAGNOSIS — E55.9 VITAMIN D DEFICIENCY: ICD-10-CM

## 2019-09-25 LAB
ANION GAP SERPL CALCULATED.3IONS-SCNC: 14.8 MMOL/L (ref 5–15)
BUN BLD-MCNC: 14 MG/DL (ref 8–23)
BUN/CREAT SERPL: 17.9 (ref 7–25)
CALCIUM SPEC-SCNC: 10.3 MG/DL (ref 8.6–10.5)
CHLORIDE SERPL-SCNC: 103 MMOL/L (ref 98–107)
CO2 SERPL-SCNC: 24.2 MMOL/L (ref 22–29)
CREAT BLD-MCNC: 0.78 MG/DL (ref 0.57–1)
GFR SERPL CREATININE-BSD FRML MDRD: 73 ML/MIN/1.73
GLUCOSE BLD-MCNC: 135 MG/DL (ref 65–99)
HBA1C MFR BLD: 5.8 %
POTASSIUM BLD-SCNC: 4.8 MMOL/L (ref 3.5–5.2)
SODIUM BLD-SCNC: 142 MMOL/L (ref 136–145)

## 2019-09-25 PROCEDURE — 82306 VITAMIN D 25 HYDROXY: CPT | Performed by: FAMILY MEDICINE

## 2019-09-25 PROCEDURE — 99214 OFFICE O/P EST MOD 30 MIN: CPT | Performed by: FAMILY MEDICINE

## 2019-09-25 PROCEDURE — 80048 BASIC METABOLIC PNL TOTAL CA: CPT | Performed by: FAMILY MEDICINE

## 2019-09-25 PROCEDURE — 83036 HEMOGLOBIN GLYCOSYLATED A1C: CPT | Performed by: FAMILY MEDICINE

## 2019-09-25 NOTE — PROGRESS NOTES
Subjective   Leila Pierre is a 74 y.o. female.     Hyperlipidemia   This is a chronic problem. The current episode started more than 1 year ago. The problem is controlled. Exacerbating diseases include diabetes. There are no known factors aggravating her hyperlipidemia. Pertinent negatives include no chest pain, myalgias or shortness of breath. Current antihyperlipidemic treatment includes statins (not currently taking crestor). The current treatment provides significant improvement of lipids. Compliance problems include medication side effects.  Risk factors for coronary artery disease include hypertension, post-menopausal, a sedentary lifestyle and diabetes mellitus.   Diabetes   She presents for her follow-up diabetic visit. She has type 2 diabetes mellitus. Her disease course has been stable. There are no hypoglycemic associated symptoms. Pertinent negatives for hypoglycemia include no dizziness or headaches. Pertinent negatives for diabetes include no chest pain, no fatigue, no polydipsia, no polyphagia, no polyuria, no visual change and no weakness. There are no hypoglycemic complications. Symptoms are stable. There are no diabetic complications. Risk factors for coronary artery disease include obesity, post-menopausal and sedentary lifestyle. Current diabetic treatment includes oral agent (monotherapy). She is compliant with treatment most of the time. An ACE inhibitor/angiotensin II receptor blocker is being taken. Eye exam is current.   Hypertension   This is a chronic problem. The current episode started more than 1 year ago. The problem is controlled. Pertinent negatives include no chest pain, headaches, palpitations, peripheral edema or shortness of breath. Risk factors for coronary artery disease include post-menopausal state, sedentary lifestyle and stress. Current antihypertension treatment includes central alpha agonists, calcium channel blockers, angiotensin blockers, beta blockers and  "diuretics. The current treatment provides moderate improvement. There are no compliance problems.  There is no history of angina, kidney disease, CAD/MI or left ventricular hypertrophy.        The following portions of the patient's history were reviewed and updated as appropriate: allergies, current medications, past social history and problem list.    Review of Systems   Constitutional: Negative for activity change and fatigue.   HENT: Negative for congestion and sore throat.    Eyes: Negative for pain and visual disturbance.   Respiratory: Negative for chest tightness and shortness of breath.    Cardiovascular: Negative for chest pain, palpitations and leg swelling.   Gastrointestinal: Negative for diarrhea, nausea and vomiting.   Endocrine: Negative for polydipsia, polyphagia and polyuria.   Genitourinary: Negative for dysuria and hematuria.   Musculoskeletal: Negative for myalgias.   Neurological: Negative for dizziness, syncope, weakness and headaches.       Objective   /82   Pulse 68   Resp 16   Ht 165.1 cm (65\")   Wt 80.6 kg (177 lb 9.6 oz)   LMP  (LMP Unknown)   SpO2 98%   BMI 29.55 kg/m²   Physical Exam   Constitutional: She is oriented to person, place, and time. She appears well-developed and well-nourished. She is cooperative.   HENT:   Head: Normocephalic.   Right Ear: External ear normal.   Left Ear: External ear normal.   Nose: Nose normal.   Mouth/Throat: Oropharynx is clear and moist.   Eyes: Conjunctivae are normal. Pupils are equal, round, and reactive to light. No scleral icterus.   Neck: Neck supple. No JVD present. Carotid bruit is not present. No thyromegaly present.   Cardiovascular: Normal rate, regular rhythm, normal heart sounds and intact distal pulses.   Pulmonary/Chest: Effort normal and breath sounds normal. She has no wheezes.   Abdominal: There is no hepatosplenomegaly.   Musculoskeletal: Normal range of motion. She exhibits no edema.   Lymphadenopathy:     She has no " cervical adenopathy.   Neurological: She is alert and oriented to person, place, and time.   No focal deficits no lateralizing signs   Skin: Skin is warm and dry. No rash noted.   Psychiatric: She has a normal mood and affect. Cognition and memory are normal.   Nursing note and vitals reviewed.      Assessment/Plan   Problem List Items Addressed This Visit        Active Problems    Vitamin D deficiency    Relevant Orders    Vitamin D 25 Hydroxy    Diabetes mellitus (CMS/HCC) - Primary    Relevant Orders    POC Glycosylated Hemoglobin (Hb A1C) (Completed)    Basic Metabolic Panel    Essential hypertension    Relevant Orders    Basic Metabolic Panel          No orders of the defined types were placed in this encounter.    Continue current medications A1c is quite acceptable, check on flu shot soon.  Continued follow-up with ENT regarding inner ear dysfunction.

## 2019-09-26 LAB — 25(OH)D3 SERPL-MCNC: 56.8 NG/ML (ref 30–100)

## 2019-10-14 RX ORDER — CARVEDILOL 25 MG/1
TABLET ORAL
Qty: 180 TABLET | Refills: 2 | Status: SHIPPED | OUTPATIENT
Start: 2019-10-14 | End: 2020-09-29

## 2019-10-14 RX ORDER — CARVEDILOL 25 MG/1
TABLET ORAL
Qty: 180 TABLET | Refills: 2 | Status: SHIPPED | OUTPATIENT
Start: 2019-10-14 | End: 2021-01-04 | Stop reason: SDUPTHER

## 2019-10-15 ENCOUNTER — HOSPITAL ENCOUNTER (OUTPATIENT)
Dept: MAMMOGRAPHY | Facility: HOSPITAL | Age: 74
Discharge: HOME | End: 2019-10-15
Admitting: FAMILY MEDICINE

## 2019-10-15 DIAGNOSIS — Z12.31 VISIT FOR SCREENING MAMMOGRAM: ICD-10-CM

## 2019-10-15 PROCEDURE — 77067 SCR MAMMO BI INCL CAD: CPT

## 2019-10-15 PROCEDURE — 77063 BREAST TOMOSYNTHESIS BI: CPT | Performed by: RADIOLOGY

## 2019-10-15 PROCEDURE — 77063 BREAST TOMOSYNTHESIS BI: CPT

## 2019-10-15 PROCEDURE — 77067 SCR MAMMO BI INCL CAD: CPT | Performed by: RADIOLOGY

## 2019-12-02 RX ORDER — CLONIDINE 0.2 MG/24H
PATCH, EXTENDED RELEASE TRANSDERMAL
Qty: 4 PATCH | Refills: 11 | Status: SHIPPED | OUTPATIENT
Start: 2019-12-02 | End: 2019-12-06 | Stop reason: SDUPTHER

## 2019-12-06 ENCOUNTER — OFFICE VISIT (OUTPATIENT)
Dept: FAMILY MEDICINE CLINIC | Facility: CLINIC | Age: 74
End: 2019-12-06

## 2019-12-06 VITALS
HEIGHT: 65 IN | RESPIRATION RATE: 16 BRPM | OXYGEN SATURATION: 96 % | SYSTOLIC BLOOD PRESSURE: 138 MMHG | WEIGHT: 176.4 LBS | HEART RATE: 67 BPM | BODY MASS INDEX: 29.39 KG/M2 | DIASTOLIC BLOOD PRESSURE: 82 MMHG

## 2019-12-06 DIAGNOSIS — E78.01 FAMILIAL HYPERCHOLESTEROLEMIA: ICD-10-CM

## 2019-12-06 DIAGNOSIS — I10 ESSENTIAL HYPERTENSION: Primary | ICD-10-CM

## 2019-12-06 PROCEDURE — 99213 OFFICE O/P EST LOW 20 MIN: CPT | Performed by: FAMILY MEDICINE

## 2019-12-09 NOTE — PROGRESS NOTES
Subjective   Leila Pierre is a 74 y.o. female.      Diabetes    She presents for her follow-up diabetic visit. She has type 2 diabetes mellitus. Her disease course has been stable. There are no hypoglycemic associated symptoms. Pertinent negatives for hypoglycemia include no dizziness or headaches. Pertinent negatives for diabetes include no chest pain, no fatigue, no polydipsia, no polyphagia, no polyuria, no visual change and no weakness. There are no hypoglycemic complications. Symptoms are stable. There are no diabetic complications. Risk factors for coronary artery disease include obesity, post-menopausal and sedentary lifestyle. Current diabetic treatment includes oral agent (monotherapy). She is compliant with treatment most of the time. Her weight is stable. An ACE inhibitor/angiotensin II receptor blocker is being taken. She  does not see a podiatrist. Eye exam is current.    Hypertension    This is a chronic problem. The current episode started more than 1 year ago. The problem is controlled. Pertinent negatives include no chest pain, headaches, palpitations, peripheral edema or shortness of breath. Risk factors for coronary artery disease include post-menopausal state, sedentary lifestyle and stress. Current antihypertension treatment includes central alpha agonists, calcium channel blockers, angiotensin blockers, beta blockers and diuretics. The current treatment provides moderate improvement. There are no compliance problems.  There is no history of angina, kidney disease, CAD/MI or left ventricular hypertrophy.    Hyperlipidemia    This is a chronic problem. The current episode started more than 1 year ago. The problem is controlled. Exacerbating diseases include diabetes. There are no known factors aggravating her hyperlipidemia. Pertinent negatives include no chest pain, myalgias or shortness of breath. Current antihyperlipidemic treatment includes statins (not currently taking crestor). The  "current treatment provides significant improvement of lipids. Compliance problems include medication side effects.  Risk factors for coronary artery disease include hypertension, post-menopausal, a sedentary lifestyle and diabetes mellitus.        The following portions of the patient's history were reviewed and updated as appropriate: allergies, current medications, past social history and problem list.    Review of Systems   Constitutional: Negative for activity change and fatigue.   Eyes: Negative for pain and visual disturbance.   Respiratory: Negative for chest tightness and shortness of breath.    Cardiovascular: Negative for chest pain, palpitations and leg swelling.   Gastrointestinal: Negative for diarrhea, nausea and vomiting.   Endocrine: Negative for polydipsia, polyphagia and polyuria.   Genitourinary: Negative for dysuria and hematuria.   Musculoskeletal: Positive for arthralgias. Negative for myalgias.   Skin: Positive for rash. Negative for color change.   Neurological: Negative for dizziness, syncope, weakness and headaches.       Objective   /82   Pulse 67   Resp 16   Ht 165.1 cm (65\")   Wt 80 kg (176 lb 6.4 oz)   LMP  (LMP Unknown)   SpO2 96%   BMI 29.35 kg/m²    Physical Exam   Constitutional: She is oriented to person, place, and time. She appears well-developed and well-nourished. She is cooperative.   HENT:   Head: Normocephalic.   Right Ear: External ear normal.   Left Ear: External ear normal.   Nose: Nose normal.   Mouth/Throat: Oropharynx is clear and moist.   Eyes: Pupils are equal, round, and reactive to light. Conjunctivae are normal. No scleral icterus.   Neck: Neck supple. Carotid bruit is not present. No thyromegaly present.   Cardiovascular: Normal rate, regular rhythm, normal heart sounds and intact distal pulses.   Pulmonary/Chest: Effort normal and breath sounds normal.   Abdominal: There is no hepatosplenomegaly.   Musculoskeletal: Normal range of motion. She " exhibits edema.   Neurological: She is alert and oriented to person, place, and time.   No focal deficits no lateralizing signs   Skin: Skin is warm and dry. No rash noted.   Psychiatric: She has a normal mood and affect. Her behavior is normal. Judgment and thought content normal. Cognition and memory are normal.   Nursing note and vitals reviewed.      Assessment/Plan   Problem List Items Addressed This Visit        Active Problems    Hyperlipidemia    Essential hypertension - Primary      Occasions and close monitoring of blood pressure A1c is acceptable.No orders of the defined types were placed in this encounter.    Will be seeing endocrinology in 2 weeks we will have it checked again at that time.

## 2019-12-10 RX ORDER — CLONIDINE 0.2 MG/24H
1 PATCH, EXTENDED RELEASE TRANSDERMAL WEEKLY
Qty: 4 PATCH | Refills: 12 | Status: SHIPPED | OUTPATIENT
Start: 2019-12-10 | End: 2020-03-12 | Stop reason: SDUPTHER

## 2019-12-11 PROCEDURE — 85027 COMPLETE CBC AUTOMATED: CPT | Performed by: FAMILY MEDICINE

## 2019-12-11 PROCEDURE — 80053 COMPREHEN METABOLIC PANEL: CPT | Performed by: FAMILY MEDICINE

## 2019-12-11 PROCEDURE — 80061 LIPID PANEL: CPT | Performed by: FAMILY MEDICINE

## 2019-12-11 PROCEDURE — 84443 ASSAY THYROID STIM HORMONE: CPT | Performed by: FAMILY MEDICINE

## 2019-12-27 DIAGNOSIS — I10 HTN (HYPERTENSION), MALIGNANT: ICD-10-CM

## 2019-12-27 DIAGNOSIS — E78.5 DYSLIPIDEMIA: ICD-10-CM

## 2019-12-27 RX ORDER — AZILSARTAN KAMEDOXOMIL 80 MG/1
TABLET ORAL
Qty: 90 TABLET | Refills: 4 | Status: SHIPPED | OUTPATIENT
Start: 2019-12-27 | End: 2021-03-22

## 2020-01-10 ENCOUNTER — TELEPHONE (OUTPATIENT)
Dept: FAMILY MEDICINE CLINIC | Facility: CLINIC | Age: 75
End: 2020-01-10

## 2020-01-10 NOTE — TELEPHONE ENCOUNTER
Patient called and stated that her diabetes provider needs a copy of [facility] to [facility].    [facility] Phone # [phone] Fax # [phone]    Patient Callback # [phone]

## 2020-02-07 RX ORDER — FUROSEMIDE 20 MG/1
TABLET ORAL
Qty: 180 TABLET | Refills: 0 | Status: SHIPPED | OUTPATIENT
Start: 2020-02-07 | End: 2020-05-05

## 2020-02-21 RX ORDER — POTASSIUM CHLORIDE 750 MG/1
TABLET, EXTENDED RELEASE ORAL
Qty: 180 TABLET | Refills: 0 | Status: SHIPPED | OUTPATIENT
Start: 2020-02-21 | End: 2020-05-25 | Stop reason: SDUPTHER

## 2020-03-12 ENCOUNTER — OFFICE VISIT (OUTPATIENT)
Dept: FAMILY MEDICINE CLINIC | Facility: CLINIC | Age: 75
End: 2020-03-12

## 2020-03-12 VITALS
OXYGEN SATURATION: 97 % | DIASTOLIC BLOOD PRESSURE: 82 MMHG | RESPIRATION RATE: 16 BRPM | HEART RATE: 65 BPM | WEIGHT: 174.6 LBS | HEIGHT: 65 IN | BODY MASS INDEX: 29.09 KG/M2 | SYSTOLIC BLOOD PRESSURE: 128 MMHG

## 2020-03-12 DIAGNOSIS — E78.01 FAMILIAL HYPERCHOLESTEROLEMIA: ICD-10-CM

## 2020-03-12 DIAGNOSIS — I10 ESSENTIAL HYPERTENSION: Primary | ICD-10-CM

## 2020-03-12 PROCEDURE — 99213 OFFICE O/P EST LOW 20 MIN: CPT | Performed by: FAMILY MEDICINE

## 2020-03-12 RX ORDER — CLONIDINE 0.2 MG/24H
1 PATCH, EXTENDED RELEASE TRANSDERMAL WEEKLY
Qty: 12 PATCH | Refills: 3 | Status: SHIPPED | OUTPATIENT
Start: 2020-03-12 | End: 2021-01-26 | Stop reason: SDUPTHER

## 2020-03-12 RX ORDER — LEVOTHYROXINE SODIUM 0.1 MG/1
100 TABLET ORAL DAILY
COMMUNITY
End: 2020-09-29

## 2020-03-13 NOTE — PROGRESS NOTES
"Subjective   Leila Pierre is a 74 y.o. female.     Hypertension   This is a chronic problem. The current episode started more than 1 year ago. The problem is unchanged. The problem is controlled. Pertinent negatives include no chest pain, headaches, malaise/fatigue, palpitations, peripheral edema or shortness of breath. Risk factors for coronary artery disease include sedentary lifestyle and post-menopausal state. Current antihypertension treatment includes beta blockers, ACE inhibitors, diuretics and alpha 1 blockers. The current treatment provides significant improvement. There are no compliance problems.  There is no history of angina, kidney disease or CAD/MI.   Hyperlipidemia   This is a chronic problem. The problem is controlled. Pertinent negatives include no chest pain, myalgias or shortness of breath. Current antihyperlipidemic treatment includes diet change. The current treatment provides moderate improvement of lipids. There are no compliance problems.         The following portions of the patient's history were reviewed and updated as appropriate: allergies, current medications, past social history and problem list.    Review of Systems   Constitutional: Negative for activity change, fatigue and malaise/fatigue.   Eyes: Negative for pain and visual disturbance.   Respiratory: Negative for chest tightness and shortness of breath.    Cardiovascular: Negative for chest pain, palpitations and leg swelling.   Gastrointestinal: Negative for diarrhea, nausea and vomiting.   Genitourinary: Negative for dysuria and hematuria.   Musculoskeletal: Positive for arthralgias. Negative for myalgias.   Skin: Positive for rash.   Neurological: Negative for dizziness, syncope and headaches.       Objective   /82   Pulse 65   Resp 16   Ht 165.1 cm (65\")   Wt 79.2 kg (174 lb 9.6 oz)   LMP  (LMP Unknown)   SpO2 97%   BMI 29.05 kg/m²   Physical Exam   Constitutional: She is oriented to person, place, and " time. She appears well-developed and well-nourished. She is cooperative.   HENT:   Head: Normocephalic.   Right Ear: External ear normal.   Left Ear: External ear normal.   Nose: Nose normal.   Mouth/Throat: Oropharynx is clear and moist.   Eyes: Pupils are equal, round, and reactive to light. Conjunctivae are normal. No scleral icterus.   Neck: Neck supple. No JVD present. Carotid bruit is not present. No thyromegaly present.   Cardiovascular: Normal rate, regular rhythm and normal heart sounds.   Pulmonary/Chest: Effort normal and breath sounds normal.   Abdominal: There is no hepatosplenomegaly.   Musculoskeletal: Normal range of motion. She exhibits no edema or tenderness.   Neurological: She is alert and oriented to person, place, and time.   No focal deficits no lateralizing signs   Skin: Skin is warm and dry. No rash noted.   Psychiatric: She has a normal mood and affect. Cognition and memory are normal.   Nursing note and vitals reviewed.      Assessment/Plan   Problem List Items Addressed This Visit        Active Problems    Hyperlipidemia    Essential hypertension - Primary    Relevant Medications    cloNIDine (CATAPRES-TTS) 0.2 MG/24HR patch          New Medications Ordered This Visit   Medications   • cloNIDine (CATAPRES-TTS) 0.2 MG/24HR patch     Sig: Place 1 patch on the skin as directed by provider 1 (One) Time Per Week.     Dispense:  12 patch     Refill:  3

## 2020-04-16 RX ORDER — AMLODIPINE BESYLATE 5 MG/1
TABLET ORAL
Qty: 180 TABLET | Refills: 2 | OUTPATIENT
Start: 2020-04-16

## 2020-04-16 RX ORDER — AMLODIPINE BESYLATE 5 MG/1
5 TABLET ORAL 2 TIMES DAILY
Qty: 180 TABLET | Refills: 1 | Status: SHIPPED | OUTPATIENT
Start: 2020-04-16 | End: 2020-10-12

## 2020-05-05 RX ORDER — FUROSEMIDE 20 MG/1
TABLET ORAL
Qty: 180 TABLET | Refills: 0 | Status: SHIPPED | OUTPATIENT
Start: 2020-05-05 | End: 2020-08-01

## 2020-05-22 RX ORDER — POTASSIUM CHLORIDE 750 MG/1
TABLET, EXTENDED RELEASE ORAL
Qty: 180 TABLET | Refills: 0 | OUTPATIENT
Start: 2020-05-22

## 2020-05-25 RX ORDER — POTASSIUM CHLORIDE 750 MG/1
10 TABLET, EXTENDED RELEASE ORAL 2 TIMES DAILY
Qty: 180 TABLET | Refills: 1 | Status: SHIPPED | OUTPATIENT
Start: 2020-05-25 | End: 2020-11-25

## 2020-06-16 ENCOUNTER — OFFICE VISIT (OUTPATIENT)
Dept: FAMILY MEDICINE CLINIC | Facility: CLINIC | Age: 75
End: 2020-06-16

## 2020-06-16 ENCOUNTER — RESULTS ENCOUNTER (OUTPATIENT)
Dept: FAMILY MEDICINE CLINIC | Facility: CLINIC | Age: 75
End: 2020-06-16

## 2020-06-16 VITALS
SYSTOLIC BLOOD PRESSURE: 132 MMHG | HEART RATE: 64 BPM | OXYGEN SATURATION: 99 % | DIASTOLIC BLOOD PRESSURE: 72 MMHG | BODY MASS INDEX: 28.99 KG/M2 | HEIGHT: 65 IN | WEIGHT: 174 LBS | TEMPERATURE: 98.5 F | RESPIRATION RATE: 18 BRPM

## 2020-06-16 DIAGNOSIS — E11.9 TYPE 2 DIABETES MELLITUS WITHOUT COMPLICATION, WITHOUT LONG-TERM CURRENT USE OF INSULIN (HCC): ICD-10-CM

## 2020-06-16 DIAGNOSIS — Z12.11 COLON CANCER SCREENING: Primary | ICD-10-CM

## 2020-06-16 DIAGNOSIS — E55.9 VITAMIN D DEFICIENCY: ICD-10-CM

## 2020-06-16 DIAGNOSIS — Z12.11 COLON CANCER SCREENING: ICD-10-CM

## 2020-06-16 DIAGNOSIS — E78.01 FAMILIAL HYPERCHOLESTEROLEMIA: ICD-10-CM

## 2020-06-16 DIAGNOSIS — I10 ESSENTIAL HYPERTENSION: ICD-10-CM

## 2020-06-16 DIAGNOSIS — Z00.00 MEDICARE ANNUAL WELLNESS VISIT, SUBSEQUENT: Primary | ICD-10-CM

## 2020-06-16 LAB
BILIRUB BLD-MCNC: NEGATIVE MG/DL
CLARITY, POC: CLEAR
COLOR UR: YELLOW
GLUCOSE UR STRIP-MCNC: NEGATIVE MG/DL
HBA1C MFR BLD: 6.4 %
KETONES UR QL: NEGATIVE
LEUKOCYTE EST, POC: NEGATIVE
NITRITE UR-MCNC: NEGATIVE MG/ML
PH UR: 6 [PH] (ref 5–8)
POC CREATININE URINE: 200
POC MICROALBUMIN URINE: 150
PROT UR STRIP-MCNC: NEGATIVE MG/DL
RBC # UR STRIP: ABNORMAL /UL
SP GR UR: 1.02 (ref 1–1.03)
TSH SERPL DL<=0.05 MIU/L-ACNC: 4.57 UIU/ML (ref 0.27–4.2)
UROBILINOGEN UR QL: NORMAL

## 2020-06-16 PROCEDURE — 82306 VITAMIN D 25 HYDROXY: CPT | Performed by: FAMILY MEDICINE

## 2020-06-16 PROCEDURE — 81003 URINALYSIS AUTO W/O SCOPE: CPT | Performed by: FAMILY MEDICINE

## 2020-06-16 PROCEDURE — 83036 HEMOGLOBIN GLYCOSYLATED A1C: CPT | Performed by: FAMILY MEDICINE

## 2020-06-16 PROCEDURE — 84443 ASSAY THYROID STIM HORMONE: CPT | Performed by: FAMILY MEDICINE

## 2020-06-16 PROCEDURE — 80053 COMPREHEN METABOLIC PANEL: CPT | Performed by: FAMILY MEDICINE

## 2020-06-16 PROCEDURE — 82044 UR ALBUMIN SEMIQUANTITATIVE: CPT | Performed by: FAMILY MEDICINE

## 2020-06-16 PROCEDURE — 93000 ELECTROCARDIOGRAM COMPLETE: CPT | Performed by: FAMILY MEDICINE

## 2020-06-16 PROCEDURE — 85027 COMPLETE CBC AUTOMATED: CPT | Performed by: FAMILY MEDICINE

## 2020-06-16 PROCEDURE — G0439 PPPS, SUBSEQ VISIT: HCPCS | Performed by: FAMILY MEDICINE

## 2020-06-16 PROCEDURE — 80061 LIPID PANEL: CPT | Performed by: FAMILY MEDICINE

## 2020-06-16 NOTE — PROGRESS NOTES
The ABCs of the Annual Wellness Visit  Subsequent Medicare Wellness Visit    Chief Complaint   Patient presents with   • Annual Exam       Subjective   History of Present Illness:  Leila Pierre is a 75 y.o. female who presents for a Subsequent Medicare Wellness Visit.    HEALTH RISK ASSESSMENT    Recent Hospitalizations:  No hospitalization(s) within the last year.    Current Medical Providers:  Patient Care Team:  Ike Cifuentes MD as PCP - General  Ike Cifuentes MD as PCP - Family Medicine    Smoking Status:  Social History     Tobacco Use   Smoking Status Never Smoker   Smokeless Tobacco Never Used       Alcohol Consumption:  Social History     Substance and Sexual Activity   Alcohol Use Yes   • Alcohol/week: 1.0 - 2.0 standard drinks   • Types: 1 - 2 Glasses of wine per week    Comment: occasional wine       Depression Screen:   PHQ-2/PHQ-9 Depression Screening 6/16/2020   Little interest or pleasure in doing things 0   Feeling down, depressed, or hopeless 0   Total Score 0       Fall Risk Screen:  STEADI Fall Risk Assessment has not been completed.    Health Habits and Functional and Cognitive Screening:  No flowsheet data found.      Does the patient have evidence of cognitive impairment? No    Asprin use counseling:Taking ASA appropriately as indicated    Age-appropriate Screening Schedule:  Refer to the list below for future screening recommendations based on patient's age, sex and/or medical conditions. Orders for these recommended tests are listed in the plan section. The patient has been provided with a written plan.    Health Maintenance   Topic Date Due   • TDAP/TD VACCINES (3 - Td) 08/15/2016   • DIABETIC EYE EXAM  11/27/2019   • INFLUENZA VACCINE  08/01/2020   • LIPID PANEL  12/11/2020   • HEMOGLOBIN A1C  12/16/2020   • DIABETIC FOOT EXAM  06/16/2021   • URINE MICROALBUMIN  06/16/2021   • MAMMOGRAM  10/15/2021   • COLONOSCOPY  11/08/2028   • ZOSTER VACCINE  Completed          The  following portions of the patient's history were reviewed and updated as appropriate: allergies, current medications, past family history, past medical history, past social history, past surgical history and problem list.    Outpatient Medications Prior to Visit   Medication Sig Dispense Refill   • amLODIPine (NORVASC) 5 MG tablet Take 1 tablet by mouth 2 (Two) Times a Day. 180 tablet 1   • aspirin 81 MG EC tablet Take 81 mg by mouth Daily.     • baclofen (LIORESAL) 10 MG tablet Take 1 tablet by mouth 3 (Three) Times a Day As Needed for Muscle Spasms. (Patient taking differently: Take 10 mg by mouth As Needed for Muscle Spasms.) 90 tablet 2   • BIOTIN 5000 PO Take 1 tablet by mouth Daily.     • Calcium Carbonate (CALCIUM 600 PO) Take 1 tablet by mouth Daily.     • carvedilol (COREG) 25 MG tablet TAKE ONE TABLET BY MOUTH TWICE A DAY WITH MEALS 180 tablet 2   • carvedilol (COREG) 25 MG tablet TAKE ONE TABLET BY MOUTH TWICE A DAY WITH MEALS 180 tablet 2   • cholecalciferol (VITAMIN D3) 1000 UNITS tablet Take 1,000 Units by mouth Daily.     • cloNIDine (CATAPRES-TTS) 0.2 MG/24HR patch Place 1 patch on the skin as directed by provider 1 (One) Time Per Week. 12 patch 3   • EDARBI 80 MG tablet tablet TAKE 1 TABLET DAILY 90 tablet 4   • fexofenadine (ALLEGRA) 180 MG tablet Take 180 mg by mouth Daily.     • furosemide (LASIX) 20 MG tablet TAKE ONE TABLET BY MOUTH TWICE A  tablet 0   • levothyroxine (SYNTHROID, LEVOTHROID) 100 MCG tablet Take 100 mcg by mouth Daily.     • meclizine (ANTIVERT) 25 MG tablet Take 1 tablet by mouth 3 (Three) Times a Day As Needed for dizziness. 30 tablet 0   • metFORMIN (GLUCOPHAGE) 500 MG tablet Take 2 tablets by mouth 2 (Two) Times a Day With Meals. 360 tablet 3   • potassium chloride (K-DUR,KLOR-CON) 10 MEQ CR tablet Take 1 tablet by mouth 2 (Two) Times a Day. 180 tablet 1   • raNITIdine (ZANTAC) 150 MG tablet Take 150 mg by mouth 2 (Two) Times a Day.       No facility-administered  medications prior to visit.        Patient Active Problem List   Diagnosis   • Elevated alanine aminotransferase (ALT) level   • Hyperlipidemia   • Vitamin D deficiency   • Type 2 diabetes mellitus without complication, without long-term current use of insulin (CMS/[facility])   • Generalized osteoarthritis   • Eczema   • Essential hypertension   • Muscle spasm   • Mild obstructive sleep apnea   • Snoring       Advanced Care Planning:  ACP discussion was held with the patient during this visit. Patient has an advance directive (not in EMR), copy requested.    Review of Systems   Constitutional: Negative for activity change and unexpected weight change.   HENT: Negative for congestion and sore throat.    Eyes: Negative for pain and visual disturbance.   Respiratory: Negative for cough and shortness of breath.    Cardiovascular: Negative for chest pain, palpitations and leg swelling.   Gastrointestinal: Negative for diarrhea, nausea and vomiting.        Occasional heartburn   Endocrine: Negative for cold intolerance and heat intolerance.   Genitourinary: Negative for dysuria and hematuria.   Musculoskeletal: Positive for arthralgias and myalgias. Negative for joint swelling.   Skin: Negative for color change and rash.   Allergic/Immunologic: Positive for environmental allergies. Negative for food allergies.   Neurological: Negative for syncope and headaches.   Hematological: Negative for adenopathy. Does not bruise/bleed easily.   Psychiatric/Behavioral: Negative for dysphoric mood and sleep disturbance. The patient is not nervous/anxious.        Compared to one year ago, the patient feels her physical health is the same.  Compared to one year ago, the patient feels her mental health is the same.    Reviewed chart for potential of high risk medication in the elderly: yes  Reviewed chart for potential of harmful drug interactions in the elderly:not applicable    Objective         Vitals:    06/16/20 0928   BP: 132/72   Pulse:  "64   Resp: 18   Temp: 98.5 °F (36.9 °C)   SpO2: 99%   Weight: 78.9 kg (174 lb)   Height: 165.1 cm (65\")       Body mass index is 28.96 kg/m².  Discussed the patient's BMI with her. The BMI is above average; BMI management plan is completed  .    Physical Exam   Constitutional: She is oriented to person, place, and time. She appears well-developed and well-nourished. She is cooperative.   HENT:   Head: Normocephalic and atraumatic.   Eyes: Pupils are equal, round, and reactive to light. Conjunctivae are normal. No scleral icterus.   Neck: Neck supple. No JVD present. Carotid bruit is not present. No thyromegaly present.   Cardiovascular: Normal rate, regular rhythm, normal heart sounds and intact distal pulses.   Pulmonary/Chest: Effort normal and breath sounds normal. She has no wheezes. She has no rales.   Abdominal: Soft. Bowel sounds are normal. She exhibits no mass. There is no hepatosplenomegaly.   Musculoskeletal: Normal range of motion. She exhibits no edema or tenderness.   Neurological: She is alert and oriented to person, place, and time. She displays normal reflexes. No cranial nerve deficit. Coordination normal.   No focal deficits no lateralizing signs   Skin: Skin is warm and dry. No rash noted.   Psychiatric: She has a normal mood and affect. Her behavior is normal. Judgment and thought content normal. Cognition and memory are normal.   Nursing note and vitals reviewed.      Lab Results   Component Value Date    HGBA1C 6.4 06/16/2020        Assessment/Plan   Medicare Risks and Personalized Health Plan  CMS Preventative Services Quick Reference  Cardiovascular risk  Immunizations Discussed/Encouraged (specific immunizations; Td )    The above risks/problems have been discussed with the patient.  Pertinent information has been shared with the patient in the After Visit Summary.  Follow up plans and orders are seen below in the Assessment/Plan Section.    Diagnoses and all orders for this visit:    1. " Medicare annual wellness visit, subsequent (Primary)  -     Comprehensive Metabolic Panel  -     Lipid Panel  -     TSH  -     CBC (No Diff)  -     POC Microalbumin  -     POC Urinalysis Dipstick, Automated  -     ECG 12 Lead    2. Vitamin D deficiency  -     Vitamin D 25 Hydroxy    3. Type 2 diabetes mellitus without complication, without long-term current use of insulin (CMS/[facility])  -     POC Glycosylated Hemoglobin (Hb A1C)  -     Comprehensive Metabolic Panel    4. Essential hypertension  -     Comprehensive Metabolic Panel  -     ECG 12 Lead    5. Familial hypercholesterolemia  -     Lipid Panel      Follow Up:  No follow-ups on file.     An After Visit Summary and PPPS were given to the patient.         ECG 12 Lead  Date/Time: 6/16/2020 5:31 PM  Performed by: Ike Cifuentes MD  Authorized by: Iek Cifuentes MD   Comparison: compared with previous ECG   Similar to previous ECG  Rhythm: sinus rhythm  Rate: normal  BPM: 61  Conduction: conduction normal  ST Segments: ST segments normal  T Waves: T waves normal  QRS axis: normal  Other: no other findings    Clinical impression: normal ECG        see form

## 2020-06-17 LAB
25(OH)D3 SERPL-MCNC: 53.9 NG/ML (ref 30–100)
ALBUMIN SERPL-MCNC: 4.8 G/DL (ref 3.5–5.2)
ALBUMIN/GLOB SERPL: 1.9 G/DL
ALP SERPL-CCNC: 41 U/L (ref 39–117)
ALT SERPL W P-5'-P-CCNC: 34 U/L (ref 1–33)
ANION GAP SERPL CALCULATED.3IONS-SCNC: 12.7 MMOL/L (ref 5–15)
AST SERPL-CCNC: 24 U/L (ref 1–32)
BILIRUB SERPL-MCNC: 0.5 MG/DL (ref 0.2–1.2)
BUN BLD-MCNC: 15 MG/DL (ref 8–23)
BUN/CREAT SERPL: 18.8 (ref 7–25)
CALCIUM SPEC-SCNC: 10 MG/DL (ref 8.6–10.5)
CHLORIDE SERPL-SCNC: 103 MMOL/L (ref 98–107)
CHOLEST SERPL-MCNC: 204 MG/DL (ref 0–200)
CO2 SERPL-SCNC: 22.3 MMOL/L (ref 22–29)
CREAT BLD-MCNC: 0.8 MG/DL (ref 0.57–1)
DEPRECATED RDW RBC AUTO: 44.3 FL (ref 37–54)
ERYTHROCYTE [DISTWIDTH] IN BLOOD BY AUTOMATED COUNT: 13.2 % (ref 12.3–15.4)
GFR SERPL CREATININE-BSD FRML MDRD: 70 ML/MIN/1.73
GLOBULIN UR ELPH-MCNC: 2.5 GM/DL
GLUCOSE BLD-MCNC: 127 MG/DL (ref 65–99)
HCT VFR BLD AUTO: 42 % (ref 34–46.6)
HDLC SERPL-MCNC: 37 MG/DL (ref 40–60)
HGB BLD-MCNC: 14.4 G/DL (ref 12–15.9)
LDLC SERPL CALC-MCNC: 126 MG/DL (ref 0–100)
LDLC/HDLC SERPL: 3.41 {RATIO}
MCH RBC QN AUTO: 31.4 PG (ref 26.6–33)
MCHC RBC AUTO-ENTMCNC: 34.3 G/DL (ref 31.5–35.7)
MCV RBC AUTO: 91.7 FL (ref 79–97)
PLATELET # BLD AUTO: 191 10*3/MM3 (ref 140–450)
PMV BLD AUTO: 13.3 FL (ref 6–12)
POTASSIUM BLD-SCNC: 4.6 MMOL/L (ref 3.5–5.2)
PROT SERPL-MCNC: 7.3 G/DL (ref 6–8.5)
RBC # BLD AUTO: 4.58 10*6/MM3 (ref 3.77–5.28)
SODIUM BLD-SCNC: 138 MMOL/L (ref 136–145)
TRIGL SERPL-MCNC: 204 MG/DL (ref 0–150)
VLDLC SERPL-MCNC: 40.8 MG/DL (ref 5–40)
WBC NRBC COR # BLD: 6.51 10*3/MM3 (ref 3.4–10.8)

## 2020-07-09 ENCOUNTER — TRANSCRIBE ORDERS (OUTPATIENT)
Dept: ADMINISTRATIVE | Facility: HOSPITAL | Age: 75
End: 2020-07-09

## 2020-07-09 DIAGNOSIS — Z12.31 VISIT FOR SCREENING MAMMOGRAM: Primary | ICD-10-CM

## 2020-08-01 RX ORDER — FUROSEMIDE 20 MG/1
TABLET ORAL
Qty: 60 TABLET | Refills: 0 | Status: SHIPPED | OUTPATIENT
Start: 2020-08-01 | End: 2020-08-29

## 2020-08-03 ENCOUNTER — OFFICE VISIT (OUTPATIENT)
Dept: FAMILY MEDICINE CLINIC | Facility: CLINIC | Age: 75
End: 2020-08-03

## 2020-08-03 VITALS
DIASTOLIC BLOOD PRESSURE: 78 MMHG | SYSTOLIC BLOOD PRESSURE: 136 MMHG | RESPIRATION RATE: 18 BRPM | BODY MASS INDEX: 28.32 KG/M2 | WEIGHT: 170 LBS | TEMPERATURE: 98.3 F | OXYGEN SATURATION: 99 % | HEART RATE: 93 BPM | HEIGHT: 65 IN

## 2020-08-03 DIAGNOSIS — M54.9 ACUTE UPPER BACK PAIN: Primary | ICD-10-CM

## 2020-08-03 PROCEDURE — 99213 OFFICE O/P EST LOW 20 MIN: CPT | Performed by: FAMILY MEDICINE

## 2020-08-03 RX ORDER — CYCLOBENZAPRINE HCL 10 MG
10 TABLET ORAL 3 TIMES DAILY PRN
Qty: 30 TABLET | Refills: 0 | Status: SHIPPED | OUTPATIENT
Start: 2020-08-03 | End: 2020-09-29

## 2020-08-03 NOTE — PROGRESS NOTES
"Subjective   Leila Pierre is a 75 y.o. female.     Back Pain   This is a new (Patient was sitting in her recliner had a sudden onset of upper back pain with tension radiating bilaterally) problem. The current episode started in the past 7 days. The problem occurs every several days. The problem has been rapidly worsening since onset. The pain is present in the thoracic spine. The quality of the pain is described as stabbing. The pain does not radiate. The pain is at a severity of 10/10. The pain is worse during the night. The symptoms are aggravated by standing. Stiffness is present at night. Pertinent negatives include no abdominal pain, bladder incontinence, bowel incontinence, chest pain, dysuria, fever, headaches, leg pain, numbness, paresis, paresthesias, pelvic pain, perianal numbness, tingling, weakness or weight loss. Risk factors include history of cancer, lack of exercise and obesity. Treatments tried: ER visit had a negative cardiac work-up. The treatment provided moderate relief.        The following portions of the patient's history were reviewed and updated as appropriate: allergies, current medications, past social history and problem list.    Review of Systems   Constitutional: Negative for fever and weight loss.   HENT: Negative for congestion and sore throat.    Respiratory: Negative for cough and shortness of breath.    Cardiovascular: Negative for chest pain.   Gastrointestinal: Negative for abdominal pain and bowel incontinence.   Endocrine: Negative for cold intolerance.   Genitourinary: Negative for bladder incontinence, dysuria and pelvic pain.   Musculoskeletal: Positive for back pain and myalgias.   Neurological: Negative for tingling, weakness, numbness, headaches and paresthesias.       Objective   /78   Pulse 93   Temp 98.3 °F (36.8 °C)   Resp 18   Ht 165.1 cm (65\")   Wt 77.1 kg (170 lb)   LMP  (LMP Unknown)   SpO2 99%   BMI 28.29 kg/m²   Physical Exam "   Constitutional: She is oriented to person, place, and time. She appears well-developed and well-nourished.   HENT:   Head: Normocephalic and atraumatic.   Eyes: Pupils are equal, round, and reactive to light. Conjunctivae are normal.   Neck: Neck supple. No JVD present.   Cardiovascular: Normal rate and regular rhythm.   Pulmonary/Chest: Effort normal and breath sounds normal. No respiratory distress. She has no wheezes. She has no rales. She exhibits tenderness.   Musculoskeletal: She exhibits tenderness.   Tender just above the bra line in the upper back radiates laterally breath sounds are clear and equal   Neurological: She is alert and oriented to person, place, and time.   Skin: Skin is warm and dry.   Psychiatric: She has a normal mood and affect.   Nursing note and vitals reviewed.      Assessment/Plan   Problem List Items Addressed This Visit     None      Visit Diagnoses     Acute upper back pain    -  Primary    Relevant Orders    CT thoracic spine wo contrast          New Medications Ordered This Visit   Medications   • cyclobenzaprine (FLEXERIL) 10 MG tablet     Sig: Take 1 tablet by mouth 3 (Three) Times a Day As Needed for Muscle Spasms.     Dispense:  30 tablet     Refill:  0     Schedule CT need to rule out thoracic compression fracture, continue warm compresses PRN naproxen or Tylenol

## 2020-08-13 ENCOUNTER — HOSPITAL ENCOUNTER (OUTPATIENT)
Dept: CT IMAGING | Facility: HOSPITAL | Age: 75
Discharge: HOME | End: 2020-08-13
Admitting: FAMILY MEDICINE

## 2020-08-13 DIAGNOSIS — M54.9 ACUTE UPPER BACK PAIN: ICD-10-CM

## 2020-08-13 PROCEDURE — 72128 CT CHEST SPINE W/O DYE: CPT

## 2020-08-28 RX ORDER — FUROSEMIDE 20 MG/1
TABLET ORAL
Qty: 60 TABLET | Refills: 0 | OUTPATIENT
Start: 2020-08-28

## 2020-08-29 RX ORDER — FUROSEMIDE 20 MG/1
TABLET ORAL
Qty: 60 TABLET | Refills: 0 | Status: SHIPPED | OUTPATIENT
Start: 2020-08-29 | End: 2020-08-31 | Stop reason: SDUPTHER

## 2020-08-31 ENCOUNTER — TELEPHONE (OUTPATIENT)
Dept: FAMILY MEDICINE CLINIC | Facility: CLINIC | Age: 75
End: 2020-08-31

## 2020-08-31 RX ORDER — FUROSEMIDE 20 MG/1
20 TABLET ORAL 2 TIMES DAILY
Qty: 180 TABLET | Refills: 1 | Status: SHIPPED | OUTPATIENT
Start: 2020-08-31 | End: 2021-03-24

## 2020-09-29 ENCOUNTER — OFFICE VISIT (OUTPATIENT)
Dept: FAMILY MEDICINE CLINIC | Facility: CLINIC | Age: 75
End: 2020-09-29

## 2020-09-29 VITALS
HEIGHT: 65 IN | BODY MASS INDEX: 28.32 KG/M2 | SYSTOLIC BLOOD PRESSURE: 122 MMHG | OXYGEN SATURATION: 98 % | HEART RATE: 68 BPM | DIASTOLIC BLOOD PRESSURE: 68 MMHG | RESPIRATION RATE: 18 BRPM | WEIGHT: 170 LBS | TEMPERATURE: 98.7 F

## 2020-09-29 DIAGNOSIS — E11.9 TYPE 2 DIABETES MELLITUS WITHOUT COMPLICATION, WITHOUT LONG-TERM CURRENT USE OF INSULIN (HCC): Primary | ICD-10-CM

## 2020-09-29 DIAGNOSIS — M77.8 TENDINITIS OF RIGHT SHOULDER: ICD-10-CM

## 2020-09-29 DIAGNOSIS — I10 ESSENTIAL HYPERTENSION: ICD-10-CM

## 2020-09-29 LAB
EXPIRATION DATE: NORMAL
HBA1C MFR BLD: 5.8 %
Lab: NORMAL

## 2020-09-29 PROCEDURE — 83036 HEMOGLOBIN GLYCOSYLATED A1C: CPT | Performed by: FAMILY MEDICINE

## 2020-09-29 PROCEDURE — 99214 OFFICE O/P EST MOD 30 MIN: CPT | Performed by: FAMILY MEDICINE

## 2020-09-29 RX ORDER — BACLOFEN 10 MG/1
10 TABLET ORAL 3 TIMES DAILY
Qty: 90 TABLET | Refills: 1 | Status: SHIPPED | OUTPATIENT
Start: 2020-09-29 | End: 2021-01-26 | Stop reason: SDUPTHER

## 2020-09-29 RX ORDER — METHYLPREDNISOLONE 4 MG/1
TABLET ORAL
Qty: 1 EACH | Refills: 0 | Status: SHIPPED | OUTPATIENT
Start: 2020-09-29 | End: 2020-12-03

## 2020-09-29 RX ORDER — LEVOTHYROXINE SODIUM 112 UG/1
TABLET ORAL
COMMUNITY
Start: 2020-08-20 | End: 2021-08-05 | Stop reason: DRUGHIGH

## 2020-09-29 NOTE — PROGRESS NOTES
Subjective   Leila Pierre is a 75 y.o. female.     Diabetes  She presents for her follow-up diabetic visit. She has type 2 diabetes mellitus. Her disease course has been stable. There are no hypoglycemic associated symptoms. Pertinent negatives for hypoglycemia include no dizziness or headaches. Pertinent negatives for diabetes include no chest pain, no fatigue, no polydipsia, no polyphagia, no polyuria, no visual change and no weakness. There are no hypoglycemic complications. Symptoms are stable. There are no diabetic complications. Risk factors for coronary artery disease include obesity, post-menopausal and sedentary lifestyle. Current diabetic treatment includes oral agent (monotherapy). She is compliant with treatment most of the time. Her weight is stable. An ACE inhibitor/angiotensin II receptor blocker is being taken. She does not see a podiatrist.Eye exam is current.   Hypertension  This is a chronic problem. The current episode started more than 1 year ago. The problem is unchanged. The problem is controlled. Pertinent negatives include no chest pain, headaches, palpitations, peripheral edema or shortness of breath. Risk factors for coronary artery disease include post-menopausal state, sedentary lifestyle and stress. Current antihypertension treatment includes central alpha agonists, calcium channel blockers, angiotensin blockers, beta blockers and diuretics. The current treatment provides moderate improvement. There are no compliance problems.  There is no history of angina, kidney disease, CAD/MI or left ventricular hypertrophy.   Shoulder Injury   Incident location: Pain in right shoulder in certain positions when she tries to reach behind her back is the worst and when she lays on her right side in bed. There was no injury mechanism. The quality of the pain is described as aching. The pain does not radiate. The pain is moderate. Pertinent negatives include no chest pain, numbness or tingling.  "She has tried rest and NSAIDs for the symptoms. The treatment provided mild relief.        The following portions of the patient's history were reviewed and updated as appropriate: allergies, current medications, past social history and problem list.    Review of Systems   Constitutional: Negative for appetite change, diaphoresis, fatigue and unexpected weight change.   Eyes: Negative for visual disturbance.   Respiratory: Negative for chest tightness and shortness of breath.    Cardiovascular: Negative for chest pain, palpitations and leg swelling.   Gastrointestinal: Negative for diarrhea, nausea and vomiting.   Endocrine: Negative for polydipsia, polyphagia and polyuria.   Genitourinary: Negative for dysuria and hematuria.   Musculoskeletal: Positive for arthralgias.   Skin: Negative for color change.   Neurological: Negative for dizziness, tingling, weakness, light-headedness, numbness and headaches.       Objective   /68   Pulse 68   Temp 98.7 °F (37.1 °C)   Resp 18   Ht 165.1 cm (65\")   Wt 77.1 kg (170 lb)   LMP  (LMP Unknown)   SpO2 98%   BMI 28.29 kg/m²   Physical Exam  Vitals signs and nursing note reviewed.   Constitutional:       Appearance: Normal appearance. She is well-developed.   HENT:      Head: Normocephalic.      Right Ear: External ear normal.      Left Ear: External ear normal.      Nose: Nose normal.   Eyes:      General: No scleral icterus.     Conjunctiva/sclera: Conjunctivae normal.      Pupils: Pupils are equal, round, and reactive to light.   Neck:      Musculoskeletal: Neck supple.      Thyroid: No thyromegaly.      Vascular: No carotid bruit.   Cardiovascular:      Rate and Rhythm: Normal rate and regular rhythm.      Pulses: Normal pulses.      Heart sounds: Normal heart sounds.   Pulmonary:      Effort: Pulmonary effort is normal.      Breath sounds: Normal breath sounds.   Musculoskeletal: Normal range of motion.         General: Tenderness present.      Comments: " Tenderness over right shoulder joint line but normal range of motion abduction and strength are normal peripheral pulse intact sensation normal   Skin:     General: Skin is warm and dry.      Findings: No rash.   Neurological:      Mental Status: She is alert and oriented to person, place, and time.      Comments: No focal deficits no lateralizing signs   Psychiatric:         Mood and Affect: Mood normal.         Behavior: Behavior is cooperative.         Assessment/Plan   Problem List Items Addressed This Visit        Active Problems    Type 2 diabetes mellitus without complication, without long-term current use of insulin (CMS/[facility]) - Primary    Relevant Orders    POC Glycosylated Hemoglobin (Hb A1C) (Completed)    Essential hypertension      Other Visit Diagnoses     Tendinitis of right shoulder        Relevant Medications    methylPREDNISolone (MEDROL) 4 MG dose pack          New Medications Ordered This Visit   Medications   • methylPREDNISolone (MEDROL) 4 MG dose pack     Sig: Take as directed on package instructions.     Dispense:  1 each     Refill:  0       If Medrol Dosepak is not effective I recommend she see orthopedics for further evaluation.  A1c is very good continue current regimen for blood pressure and diabetes.

## 2020-10-12 RX ORDER — AMLODIPINE BESYLATE 5 MG/1
TABLET ORAL
Qty: 180 TABLET | Refills: 1 | Status: SHIPPED | OUTPATIENT
Start: 2020-10-12 | End: 2021-04-09

## 2020-10-22 ENCOUNTER — HOSPITAL ENCOUNTER (OUTPATIENT)
Dept: MAMMOGRAPHY | Facility: HOSPITAL | Age: 75
Discharge: HOME | End: 2020-10-22
Admitting: FAMILY MEDICINE

## 2020-10-22 DIAGNOSIS — Z12.31 VISIT FOR SCREENING MAMMOGRAM: ICD-10-CM

## 2020-10-22 PROCEDURE — 77063 BREAST TOMOSYNTHESIS BI: CPT | Performed by: RADIOLOGY

## 2020-10-22 PROCEDURE — 77067 SCR MAMMO BI INCL CAD: CPT | Performed by: RADIOLOGY

## 2020-10-22 PROCEDURE — 77067 SCR MAMMO BI INCL CAD: CPT

## 2020-10-22 PROCEDURE — 77063 BREAST TOMOSYNTHESIS BI: CPT

## 2020-11-25 RX ORDER — POTASSIUM CHLORIDE 750 MG/1
TABLET, EXTENDED RELEASE ORAL
Qty: 180 TABLET | Refills: 0 | Status: SHIPPED | OUTPATIENT
Start: 2020-11-25 | End: 2021-02-24

## 2020-12-03 ENCOUNTER — OFFICE VISIT (OUTPATIENT)
Dept: ENDOCRINOLOGY | Facility: CLINIC | Age: 75
End: 2020-12-03

## 2020-12-03 ENCOUNTER — LAB (OUTPATIENT)
Dept: LAB | Facility: HOSPITAL | Age: 75
End: 2020-12-03

## 2020-12-03 VITALS
HEART RATE: 60 BPM | HEIGHT: 66 IN | BODY MASS INDEX: 27.8 KG/M2 | SYSTOLIC BLOOD PRESSURE: 128 MMHG | TEMPERATURE: 97.3 F | OXYGEN SATURATION: 100 % | WEIGHT: 173 LBS | DIASTOLIC BLOOD PRESSURE: 74 MMHG | RESPIRATION RATE: 16 BRPM

## 2020-12-03 DIAGNOSIS — E03.9 HYPOTHYROIDISM, ADULT: ICD-10-CM

## 2020-12-03 DIAGNOSIS — I10 ESSENTIAL HYPERTENSION: ICD-10-CM

## 2020-12-03 DIAGNOSIS — E11.9 TYPE 2 DIABETES MELLITUS WITHOUT COMPLICATION, WITHOUT LONG-TERM CURRENT USE OF INSULIN (HCC): Primary | ICD-10-CM

## 2020-12-03 DIAGNOSIS — E04.2 NONTOXIC MULTINODULAR GOITER: ICD-10-CM

## 2020-12-03 LAB — TSH SERPL DL<=0.05 MIU/L-ACNC: 2.25 UIU/ML (ref 0.27–4.2)

## 2020-12-03 PROCEDURE — 84443 ASSAY THYROID STIM HORMONE: CPT

## 2020-12-03 PROCEDURE — 99214 OFFICE O/P EST MOD 30 MIN: CPT | Performed by: INTERNAL MEDICINE

## 2020-12-03 PROCEDURE — 76536 US EXAM OF HEAD AND NECK: CPT | Performed by: INTERNAL MEDICINE

## 2020-12-03 RX ORDER — FAMOTIDINE 20 MG/1
20 TABLET, FILM COATED ORAL 2 TIMES DAILY
COMMUNITY

## 2020-12-03 NOTE — PROGRESS NOTES
"     Office Note      Date: 2020  Patient Name: Leila Pierre  MRN: [id number]  : 1945    Chief Complaint   Patient presents with   • Diabetes     Type 2 DM follow up       History of Present Illness:   Leila Pierre is a 75 y.o. female who presents for Diabetes type 2. Diagnosed in: . Treated in past with oral agents. Current treatments: metformin. Number of insulin shots per day: none. Checks blood sugar 1 times a day. Has low blood sugar: no. Aspirin use: Yes. Statin use: No - intolerant. ACE-I/ARB use: Yes. Changes in health since last visit: shoulder injections. Last eye exam .    She remains on T4 112mcg qd. She is taking this correctly. She isn't taking any interfering meds concurrently. She denies any sxs of hypo- or hyperthyroidism at this time. She hasn't noted any change in the size of her neck. She denies any compressive sxs    Subjective      Diabetic Complications:  Eyes: No  Kidneys: No  Feet: No  Heart: No    Diet and Exercise:  Meals per day: 3  Minutes of exercise per week: 0 mins.    Review of Systems:   Review of Systems   Constitutional: Negative.    Cardiovascular: Negative.    Gastrointestinal: Negative.    Endocrine: Negative.        The following portions of the patient's history were reviewed and updated as appropriate: allergies, current medications, past family history, past medical history, past social history, past surgical history and problem list.    Objective       Visit Vitals  /74   Pulse 60   Temp 97.3 °F (36.3 °C) (Infrared)   Resp 16   Ht 166.4 cm (65.5\")   Wt 78.5 kg (173 lb)   LMP  (LMP Unknown)   SpO2 100%   BMI 28.35 kg/m²       Physical Exam:  Physical Exam  Constitutional:       Appearance: Normal appearance.   Neurological:      Mental Status: She is alert.         Labs:    HbA1c  Lab Results   Component Value Date    HGBA1C 5.8 2020       CMP  Lab Results   Component Value Date    GLUCOSE 127 (H) 2020    BUN 15 2020 "    CREATININE 0.80 06/16/2020    EGFRIFNONA 70 06/16/2020    EGFRIFAFRI 77 02/26/2015    BCR 18.8 06/16/2020    K 4.6 06/16/2020    CO2 22.3 06/16/2020    CALCIUM 10.0 06/16/2020    PROTENTOTREF 6.9 02/26/2015    LABIL2 2.5 02/26/2015    AST 24 06/16/2020    ALT 34 (H) 06/16/2020        Lipid Panel  Lab Results   Component Value Date    CHLPL 223 (H) 09/01/2015    HDL 37 (L) 06/16/2020     (H) 06/16/2020    TRIG 204 (H) 06/16/2020        TSH  Lab Results   Component Value Date    TSH 4.570 (H) 06/16/2020        Hemoglobin A1C  Lab Results   Component Value Date    HGBA1C 5.8 09/29/2020        Microalbumin/Creatinine  No results found for: MALBCRERATIO, CREATINIURIN, MICROALBUR        Assessment / Plan      Assessment & Plan:  Problem List Items Addressed This Visit        Cardiovascular and Mediastinum    Essential hypertension    Current Assessment & Plan     BP okay.         Relevant Medications    carvedilol (COREG) 25 MG tablet    EDARBI 80 MG tablet tablet    cloNIDine (CATAPRES-TTS) 0.2 MG/24HR patch    furosemide (LASIX) 20 MG tablet    amLODIPine (NORVASC) 5 MG tablet       Endocrine    Type 2 diabetes mellitus without complication, without long-term current use of insulin (CMS/HCC) - Primary    Relevant Medications    metFORMIN (GLUCOPHAGE) 500 MG tablet    Nontoxic multinodular goiter    Current Assessment & Plan     A neck u/s was performed today.  This revealed a predominantly cystic nodule in the left thyroid lobe.  This was stable in size at 1.8cm compared to u/s from 1/2/2018.    Plan for another u/s in 3 years.         Relevant Medications    carvedilol (COREG) 25 MG tablet    levothyroxine (SYNTHROID, LEVOTHROID) 112 MCG tablet    Other Relevant Orders    US Thyroid (Completed)    Hypothyroidism, adult    Current Assessment & Plan     Check TSH today.         Relevant Medications    carvedilol (COREG) 25 MG tablet    levothyroxine (SYNTHROID, LEVOTHROID) 112 MCG tablet    Other Relevant  Orders    TSH           Return in about 3 months (around 3/3/2021) for Recheck with A1c and TSH.    Ramírez Calabrese MD   12/03/2020

## 2020-12-03 NOTE — ASSESSMENT & PLAN NOTE
A neck u/s was performed today.  This revealed a predominantly cystic nodule in the left thyroid lobe.  This was stable in size at 1.8cm compared to u/s from 1/2/2018.    Plan for another u/s in 3 years.

## 2021-01-04 RX ORDER — CARVEDILOL 25 MG/1
TABLET ORAL
Qty: 180 TABLET | Refills: 0 | OUTPATIENT
Start: 2021-01-04

## 2021-01-04 RX ORDER — CARVEDILOL 25 MG/1
25 TABLET ORAL 2 TIMES DAILY WITH MEALS
Qty: 180 TABLET | Refills: 1 | Status: SHIPPED | OUTPATIENT
Start: 2021-01-04 | End: 2021-06-30

## 2021-01-06 RX ORDER — BLOOD SUGAR DIAGNOSTIC
STRIP MISCELLANEOUS
Qty: 100 EACH | Refills: 1 | Status: SHIPPED | OUTPATIENT
Start: 2021-01-06 | End: 2021-07-06

## 2021-01-26 ENCOUNTER — OFFICE VISIT (OUTPATIENT)
Dept: FAMILY MEDICINE CLINIC | Facility: CLINIC | Age: 76
End: 2021-01-26

## 2021-01-26 VITALS
HEIGHT: 66 IN | TEMPERATURE: 98.4 F | BODY MASS INDEX: 27.32 KG/M2 | SYSTOLIC BLOOD PRESSURE: 130 MMHG | HEART RATE: 67 BPM | WEIGHT: 170 LBS | OXYGEN SATURATION: 98 % | DIASTOLIC BLOOD PRESSURE: 72 MMHG

## 2021-01-26 DIAGNOSIS — E11.9 TYPE 2 DIABETES MELLITUS WITHOUT COMPLICATION, WITHOUT LONG-TERM CURRENT USE OF INSULIN (HCC): ICD-10-CM

## 2021-01-26 DIAGNOSIS — E78.5 DYSLIPIDEMIA: ICD-10-CM

## 2021-01-26 DIAGNOSIS — I10 ESSENTIAL HYPERTENSION: Primary | ICD-10-CM

## 2021-01-26 DIAGNOSIS — I10 HTN (HYPERTENSION), MALIGNANT: ICD-10-CM

## 2021-01-26 DIAGNOSIS — E78.01 FAMILIAL HYPERCHOLESTEROLEMIA: ICD-10-CM

## 2021-01-26 LAB
ALBUMIN SERPL-MCNC: 4.7 G/DL (ref 3.5–5.2)
ALBUMIN/GLOB SERPL: 2.1 G/DL
ALP SERPL-CCNC: 43 U/L (ref 39–117)
ALT SERPL W P-5'-P-CCNC: 24 U/L (ref 1–33)
ANION GAP SERPL CALCULATED.3IONS-SCNC: 12.3 MMOL/L (ref 5–15)
AST SERPL-CCNC: 19 U/L (ref 1–32)
BILIRUB SERPL-MCNC: 0.5 MG/DL (ref 0–1.2)
BUN SERPL-MCNC: 12 MG/DL (ref 8–23)
BUN/CREAT SERPL: 18.5 (ref 7–25)
CALCIUM SPEC-SCNC: 9.8 MG/DL (ref 8.6–10.5)
CHLORIDE SERPL-SCNC: 105 MMOL/L (ref 98–107)
CHOLEST SERPL-MCNC: 192 MG/DL (ref 0–200)
CO2 SERPL-SCNC: 24.7 MMOL/L (ref 22–29)
CREAT SERPL-MCNC: 0.65 MG/DL (ref 0.57–1)
EXPIRATION DATE: ABNORMAL
GFR SERPL CREATININE-BSD FRML MDRD: 89 ML/MIN/1.73
GLOBULIN UR ELPH-MCNC: 2.2 GM/DL
GLUCOSE SERPL-MCNC: 123 MG/DL (ref 65–99)
HBA1C MFR BLD: 6.1 %
HDLC SERPL-MCNC: 38 MG/DL (ref 40–60)
LDLC SERPL CALC-MCNC: 130 MG/DL (ref 0–100)
LDLC/HDLC SERPL: 3.36 {RATIO}
Lab: ABNORMAL
POTASSIUM SERPL-SCNC: 4.3 MMOL/L (ref 3.5–5.2)
PROT SERPL-MCNC: 6.9 G/DL (ref 6–8.5)
SODIUM SERPL-SCNC: 142 MMOL/L (ref 136–145)
TRIGL SERPL-MCNC: 131 MG/DL (ref 0–150)
VLDLC SERPL-MCNC: 24 MG/DL (ref 5–40)

## 2021-01-26 PROCEDURE — 83036 HEMOGLOBIN GLYCOSYLATED A1C: CPT | Performed by: FAMILY MEDICINE

## 2021-01-26 PROCEDURE — 99214 OFFICE O/P EST MOD 30 MIN: CPT | Performed by: FAMILY MEDICINE

## 2021-01-26 PROCEDURE — 80053 COMPREHEN METABOLIC PANEL: CPT | Performed by: FAMILY MEDICINE

## 2021-01-26 PROCEDURE — 80061 LIPID PANEL: CPT | Performed by: FAMILY MEDICINE

## 2021-01-26 RX ORDER — BACLOFEN 10 MG/1
10 TABLET ORAL 3 TIMES DAILY
Qty: 90 TABLET | Refills: 2 | Status: SHIPPED | OUTPATIENT
Start: 2021-01-26 | End: 2021-06-28

## 2021-01-26 RX ORDER — CLONIDINE 0.2 MG/24H
1 PATCH, EXTENDED RELEASE TRANSDERMAL WEEKLY
Qty: 12 PATCH | Refills: 3 | Status: SHIPPED | OUTPATIENT
Start: 2021-01-26 | End: 2021-12-28

## 2021-01-27 NOTE — PROGRESS NOTES
Chief Complaint  Diabetes and Vitamin D Deficiency    Subjective          Leila Pierre presents to [facility] for   Diabetes  She presents for her follow-up diabetic visit. She has type 2 diabetes mellitus. Her disease course has been stable. There are no hypoglycemic associated symptoms. Pertinent negatives for hypoglycemia include no dizziness or headaches. Pertinent negatives for diabetes include no fatigue, no polydipsia, no polyphagia, no polyuria, no visual change and no weakness. There are no hypoglycemic complications. Symptoms are stable. There are no diabetic complications. Risk factors for coronary artery disease include obesity, post-menopausal and sedentary lifestyle. Current diabetic treatment includes oral agent (monotherapy). She is compliant with treatment most of the time. Her weight is stable. An ACE inhibitor/angiotensin II receptor blocker is being taken. She does not see a podiatrist.Eye exam is current.   Hypertension  This is a chronic problem. The current episode started more than 1 year ago. The problem is unchanged. The problem is controlled. Pertinent negatives include no headaches, palpitations, peripheral edema or shortness of breath. Risk factors for coronary artery disease include post-menopausal state, sedentary lifestyle and stress. Current antihypertension treatment includes central alpha agonists, calcium channel blockers, angiotensin blockers, beta blockers and diuretics. The current treatment provides moderate improvement. There are no compliance problems.  There is no history of angina, kidney disease, CAD/MI or left ventricular hypertrophy.   Hyperlipidemia  This is a chronic problem. The problem is controlled. Pertinent negatives include no shortness of breath. Current antihyperlipidemic treatment includes diet change. The current treatment provides moderate improvement of lipids. There are no compliance problems.  Risk factors for  "coronary artery disease include hypertension, post-menopausal and dyslipidemia.       Objective   Vital Signs:   /72   Pulse 67   Temp 98.4 °F (36.9 °C)   Ht 166.4 cm (65.5\")   Wt 77.1 kg (170 lb)   SpO2 98%   BMI 27.86 kg/m²     Physical Exam  Vitals signs and nursing note reviewed.   Constitutional:       Appearance: Normal appearance. She is well-developed.   HENT:      Head: Normocephalic.      Right Ear: External ear normal.      Left Ear: External ear normal.      Nose: Nose normal.   Eyes:      General: No scleral icterus.     Conjunctiva/sclera: Conjunctivae normal.      Pupils: Pupils are equal, round, and reactive to light.   Neck:      Musculoskeletal: Neck supple.      Thyroid: No thyromegaly.      Vascular: No carotid bruit.   Cardiovascular:      Rate and Rhythm: Normal rate and regular rhythm.   Pulmonary:      Effort: Pulmonary effort is normal.      Breath sounds: Normal breath sounds.   Abdominal:      Comments: Small umbilical hernia   Musculoskeletal: Normal range of motion.   Skin:     General: Skin is warm and dry.      Findings: No rash.   Neurological:      General: No focal deficit present.      Mental Status: She is alert and oriented to person, place, and time.      Comments: No focal deficits no lateralizing signs   Psychiatric:         Mood and Affect: Mood normal.         Behavior: Behavior is cooperative.        Result Review :                 Assessment and Plan    Problem List Items Addressed This Visit        Active Problems    Hyperlipidemia    Relevant Orders    Comprehensive Metabolic Panel (Completed)    Lipid Panel (Completed)    Type 2 diabetes mellitus without complication, without long-term current use of insulin (CMS/[facility])    Relevant Orders    POC Glycosylated Hemoglobin (Hb A1C) (Completed)    Essential hypertension - Primary    Relevant Medications    cloNIDine (CATAPRES-TTS) 0.2 MG/24HR patch    Other Relevant Orders    Comprehensive Metabolic Panel " (Completed)      Other Visit Diagnoses     HTN (hypertension), malignant        Relevant Medications    cloNIDine (CATAPRES-TTS) 0.2 MG/24HR patch    Dyslipidemia              Follow Up   Return in about 3 months (around 4/26/2021) for Diabetes with A1C.  Patient was given instructions and counseling regarding her condition or for health maintenance advice. Please see specific information pulled into the AVS if appropriate.

## 2021-02-24 RX ORDER — POTASSIUM CHLORIDE 750 MG/1
TABLET, EXTENDED RELEASE ORAL
Qty: 180 TABLET | Refills: 0 | Status: SHIPPED | OUTPATIENT
Start: 2021-02-24 | End: 2021-05-24

## 2021-03-19 ENCOUNTER — OFFICE VISIT (OUTPATIENT)
Dept: ENDOCRINOLOGY | Facility: CLINIC | Age: 76
End: 2021-03-19

## 2021-03-19 VITALS
WEIGHT: 172 LBS | HEART RATE: 68 BPM | TEMPERATURE: 97.5 F | DIASTOLIC BLOOD PRESSURE: 78 MMHG | SYSTOLIC BLOOD PRESSURE: 134 MMHG | HEIGHT: 65 IN | BODY MASS INDEX: 28.66 KG/M2

## 2021-03-19 DIAGNOSIS — E11.9 TYPE 2 DIABETES MELLITUS WITHOUT COMPLICATION, WITHOUT LONG-TERM CURRENT USE OF INSULIN (HCC): Primary | ICD-10-CM

## 2021-03-19 DIAGNOSIS — E03.9 HYPOTHYROIDISM, ADULT: ICD-10-CM

## 2021-03-19 DIAGNOSIS — I10 ESSENTIAL HYPERTENSION: ICD-10-CM

## 2021-03-19 PROCEDURE — 99214 OFFICE O/P EST MOD 30 MIN: CPT | Performed by: INTERNAL MEDICINE

## 2021-03-19 RX ORDER — LANCETS
EACH MISCELLANEOUS
Qty: 102 EACH | Refills: 3 | Status: SHIPPED | OUTPATIENT
Start: 2021-03-19 | End: 2022-12-06 | Stop reason: SDUPTHER

## 2021-03-19 NOTE — PROGRESS NOTES
"     Office Note      Date: 2021  Patient Name: Leila Pierre  MRN: [id number]  : 1945    Chief Complaint   Patient presents with   • Diabetes   • Hypothyroidism       History of Present Illness:   Leila Pierre is a 75 y.o. female who presents for Diabetes type 2. Diagnosed in: . Treated in past with oral agents. Current treatments: metformin. Number of insulin shots per day: none. Checks blood sugar 1 times a day. Has low blood sugar: no. Aspirin use: Yes. Statin use: No - intolerant. ACE-I/ARB use: Yes. Changes in health since last visit: none. Last eye exam .     She remains on T4 112mcg qd. She is taking this correctly. She isn't taking any interfering meds concurrently. She denies any sxs of hypo- or hyperthyroidism at this time. She hasn't noted any change in the size of her neck. She denies any compressive sxs.    Subjective      Diabetic Complications:  Eyes: No  Kidneys: No  Feet: No  Heart: No    Diet and Exercise:  Meals per day: 3  Minutes of exercise per week: 0 mins.    Review of Systems:   Review of Systems   Constitutional: Negative.    Cardiovascular: Negative.    Gastrointestinal: Negative.    Endocrine: Negative.        The following portions of the patient's history were reviewed and updated as appropriate: allergies, current medications, past family history, past medical history, past social history, past surgical history and problem list.    Objective       Visit Vitals  /78 (BP Location: Right arm, Patient Position: Sitting, Cuff Size: Adult)   Pulse 68   Temp 97.5 °F (36.4 °C) (Infrared)   Ht 165.1 cm (65\")   Wt 78 kg (172 lb)   LMP  (LMP Unknown)   BMI 28.62 kg/m²       Physical Exam:  Physical Exam  Constitutional:       Appearance: Normal appearance.   Neurological:      Mental Status: She is alert.         Labs:    HbA1c  Lab Results   Component Value Date    HGBA1C 6.1 2021       CMP  Lab Results   Component Value Date    GLUCOSE 123 (H) " 01/26/2021    BUN 12 01/26/2021    CREATININE 0.65 01/26/2021    EGFRIFNONA 89 01/26/2021    EGFRIFAFRI 77 02/26/2015    BCR 18.5 01/26/2021    K 4.3 01/26/2021    CO2 24.7 01/26/2021    CALCIUM 9.8 01/26/2021    PROTENTOTREF 6.9 02/26/2015    LABIL2 2.5 02/26/2015    AST 19 01/26/2021    ALT 24 01/26/2021        Lipid Panel  Lab Results   Component Value Date    CHLPL 223 (H) 09/01/2015    HDL 38 (L) 01/26/2021     (H) 01/26/2021    TRIG 131 01/26/2021        TSH  Lab Results   Component Value Date    TSH 2.250 12/03/2020        Hemoglobin A1C  Lab Results   Component Value Date    HGBA1C 6.1 01/26/2021        Microalbumin/Creatinine  No results found for: MALBCRERATIO, CREATINIURIN, MICROALBUR        Assessment / Plan      Assessment & Plan:  Diagnoses and all orders for this visit:    1. Type 2 diabetes mellitus without complication, without long-term current use of insulin (CMS/[facility]) (Primary)  Assessment & Plan:  Diabetes is unchanged.   Continue current treatment regimen.  Diabetes will be reassessed in 3 months.    Recent A1c okay at 6.1%.      2. Essential hypertension  Assessment & Plan:  Hypertension is unchanged.  Continue current treatment regimen.  Blood pressure will be reassessed at the next regular appointment.      3. Hypothyroidism, adult  Assessment & Plan:  Last TSH at goal.  Continue current T4 dose.      Other orders  -     Lancets (accu-chek multiclix) lancets; PRN  Dispense: 102 each; Refill: 3      Return in about 3 months (around 6/19/2021) for Recheck with A1c, TSH, microalbumin.    Ramírez Calabrese MD   03/19/2021

## 2021-03-21 DIAGNOSIS — I10 HTN (HYPERTENSION), MALIGNANT: ICD-10-CM

## 2021-03-21 DIAGNOSIS — E78.5 DYSLIPIDEMIA: ICD-10-CM

## 2021-03-22 RX ORDER — AZILSARTAN KAMEDOXOMIL 80 MG/1
TABLET ORAL
Qty: 90 TABLET | Refills: 0 | Status: SHIPPED | OUTPATIENT
Start: 2021-03-22 | End: 2021-04-01 | Stop reason: SDUPTHER

## 2021-03-24 RX ORDER — FUROSEMIDE 20 MG/1
TABLET ORAL
Qty: 180 TABLET | Refills: 0 | Status: SHIPPED | OUTPATIENT
Start: 2021-03-24 | End: 2021-06-28

## 2021-04-01 DIAGNOSIS — I10 HTN (HYPERTENSION), MALIGNANT: ICD-10-CM

## 2021-04-01 DIAGNOSIS — E78.5 DYSLIPIDEMIA: ICD-10-CM

## 2021-04-01 NOTE — TELEPHONE ENCOUNTER
Pharmacy would not dispense without clarification of why other ARBs were not tolerated or contrainidcated. She has been on Edarbi since 1/2019 without side effects and has achieved BP control on medication.   Refill X1 , then defer to PCP for further refills.   Pharmacy note tagged to refill.

## 2021-04-09 RX ORDER — AMLODIPINE BESYLATE 5 MG/1
TABLET ORAL
Qty: 180 TABLET | Refills: 0 | Status: SHIPPED | OUTPATIENT
Start: 2021-04-09 | End: 2021-07-10

## 2021-05-03 ENCOUNTER — OFFICE VISIT (OUTPATIENT)
Dept: FAMILY MEDICINE CLINIC | Facility: CLINIC | Age: 76
End: 2021-05-03

## 2021-05-03 VITALS
HEART RATE: 65 BPM | DIASTOLIC BLOOD PRESSURE: 70 MMHG | BODY MASS INDEX: 28.66 KG/M2 | OXYGEN SATURATION: 98 % | SYSTOLIC BLOOD PRESSURE: 102 MMHG | HEIGHT: 65 IN | TEMPERATURE: 98 F | WEIGHT: 172 LBS

## 2021-05-03 DIAGNOSIS — I10 ESSENTIAL HYPERTENSION: ICD-10-CM

## 2021-05-03 DIAGNOSIS — E11.9 TYPE 2 DIABETES MELLITUS WITHOUT COMPLICATION, WITHOUT LONG-TERM CURRENT USE OF INSULIN (HCC): Primary | ICD-10-CM

## 2021-05-03 LAB
EXPIRATION DATE: ABNORMAL
HBA1C MFR BLD: 6.1 %
Lab: ABNORMAL

## 2021-05-03 PROCEDURE — 99213 OFFICE O/P EST LOW 20 MIN: CPT | Performed by: FAMILY MEDICINE

## 2021-05-03 PROCEDURE — 83036 HEMOGLOBIN GLYCOSYLATED A1C: CPT | Performed by: FAMILY MEDICINE

## 2021-05-04 NOTE — PROGRESS NOTES
"Chief Complaint  Hypertension and Diabetes    Subjective          Leila Pierre presents to [facility]  Hypertension  This is a chronic problem. The current episode started more than 1 year ago. The problem is unchanged. The problem is controlled. Pertinent negatives include no headaches, palpitations or peripheral edema. Risk factors for coronary artery disease include post-menopausal state, sedentary lifestyle and stress. Current antihypertension treatment includes central alpha agonists, calcium channel blockers, angiotensin blockers, beta blockers and diuretics. The current treatment provides significant improvement. There are no compliance problems.  There is no history of angina, kidney disease, CAD/MI or left ventricular hypertrophy.   Diabetes  She presents for her follow-up diabetic visit. She has type 2 diabetes mellitus. Her disease course has been stable. There are no hypoglycemic associated symptoms. Pertinent negatives for hypoglycemia include no dizziness or headaches. Pertinent negatives for diabetes include no fatigue, no polydipsia, no polyphagia, no polyuria, no visual change and no weakness. There are no hypoglycemic complications. Symptoms are stable. There are no diabetic complications. Risk factors for coronary artery disease include obesity, post-menopausal and sedentary lifestyle. Current diabetic treatment includes oral agent (monotherapy). She is compliant with treatment most of the time. Her weight is stable. An ACE inhibitor/angiotensin II receptor blocker is being taken. She does not see a podiatrist.Eye exam is current.       Objective   Vital Signs:   /70   Pulse 65   Temp 98 °F (36.7 °C)   Ht 165.1 cm (65\")   Wt 78 kg (172 lb)   SpO2 98%   BMI 28.62 kg/m²     Physical Exam  Vitals and nursing note reviewed.   Constitutional:       Appearance: Normal appearance. She is well-developed.   HENT:      Head: Normocephalic.      Right Ear: " External ear normal.      Left Ear: External ear normal.      Nose: Nose normal.   Eyes:      General: No scleral icterus.     Conjunctiva/sclera: Conjunctivae normal.      Pupils: Pupils are equal, round, and reactive to light.   Neck:      Thyroid: No thyromegaly.      Vascular: No carotid bruit.   Cardiovascular:      Rate and Rhythm: Normal rate and regular rhythm.      Heart sounds: Normal heart sounds.   Pulmonary:      Effort: Pulmonary effort is normal.      Breath sounds: Normal breath sounds.   Musculoskeletal:         General: Normal range of motion.      Cervical back: Neck supple.   Skin:     General: Skin is warm and dry.      Findings: No rash.   Neurological:      General: No focal deficit present.      Mental Status: She is alert and oriented to person, place, and time.      Comments: No focal deficits no lateralizing signs   Psychiatric:         Mood and Affect: Mood normal.         Behavior: Behavior is cooperative.        Result Review :                 Assessment and Plan    Diagnoses and all orders for this visit:    1. Type 2 diabetes mellitus without complication, without long-term current use of insulin (CMS/[facility]) (Primary)  -     POC Glycosylated Hemoglobin (Hb A1C)    2. Essential hypertension        Follow Up   Return for Keep Appt..  Patient was given instructions and counseling regarding her condition or for health maintenance advice. Please see specific information pulled into the AVS if appropriate.

## 2021-05-24 RX ORDER — POTASSIUM CHLORIDE 750 MG/1
TABLET, EXTENDED RELEASE ORAL
Qty: 180 TABLET | Refills: 1 | Status: SHIPPED | OUTPATIENT
Start: 2021-05-24 | End: 2021-11-18

## 2021-06-12 ENCOUNTER — OFFICE VISIT (OUTPATIENT)
Dept: FAMILY MEDICINE CLINIC | Facility: CLINIC | Age: 76
End: 2021-06-12

## 2021-06-12 VITALS
BODY MASS INDEX: 28.79 KG/M2 | SYSTOLIC BLOOD PRESSURE: 118 MMHG | DIASTOLIC BLOOD PRESSURE: 78 MMHG | HEART RATE: 72 BPM | OXYGEN SATURATION: 96 % | WEIGHT: 172.8 LBS | HEIGHT: 65 IN | TEMPERATURE: 98.7 F

## 2021-06-12 DIAGNOSIS — S40.862A TICK BITE OF LEFT UPPER ARM, INITIAL ENCOUNTER: Primary | ICD-10-CM
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
## 2021-06-12 DIAGNOSIS — B96.89 BACTERIAL SKIN INFECTION: ICD-10-CM

## 2021-06-12 DIAGNOSIS — L08.9 BACTERIAL SKIN INFECTION: ICD-10-CM

## 2021-06-12 PROCEDURE — 99213 OFFICE O/P EST LOW 20 MIN: CPT | Performed by: NURSE PRACTITIONER

## 2021-06-12 RX ORDER — DOXYCYCLINE 100 MG/1
100 CAPSULE ORAL 2 TIMES DAILY
Qty: 20 CAPSULE | Refills: 0 | Status: SHIPPED | OUTPATIENT
Start: 2021-06-12 | End: 2021-06-22

## 2021-06-14 NOTE — PATIENT INSTRUCTIONS
Tick Bite Information, Adult  Ticks are insects that draw blood for food. Most ticks live in shrubs and grassy and wooded areas. They climb onto people and animals that brush against the leaves and grasses that they rest on. Then they bite, attaching themselves to the skin. Most ticks are harmless, but some ticks may carry germs that can spread to a person through a bite and cause a disease. To reduce your risk of getting a disease from a tick bite, make sure you:  · Take steps to prevent tick bites.  · Check for ticks after being outdoors where ticks live.  · Watch for symptoms of disease if a tick attached to you or if you suspect a tick bite.  How can I prevent tick bites?  Take these steps to help prevent tick bites when you go outdoors in an area where ticks live:  Use insect repellent  · Use insect repellent that has DEET (20% or higher), picaridin, or [id number] in it. Follow the instructions on the label. Use these products on:  ? Bare skin.  ? The top of your boots.  ? Your pant legs.  ? Your sleeve cuffs.  · For insect repellent that contains permethrin, follow the instructions on the label. Use these products on:  ? Clothing.  ? Boots.  ? Outdoor gear.  ? Tents.  When you are outside  · Wear protective clothing. Long sleeves and long pants offer the best protection from ticks.  · Wear light-colored clothing so you can see ticks more easily.  · Tuck your pant legs into your socks.  · If you go walking on a trail, stay in the middle of the trail so your skin, hair, and clothing do not touch the bushes.  · Avoid walking through areas with long grass.  · Check for ticks on your clothing, hair, and skin often while you are outside, and check again before you go inside. Make sure to check the scalp, neck, armpits, waist, groin, and joint areas. These are the spots where ticks attach themselves most often.  When you go indoors  · Check your clothing for ticks. Tumble dry clothes in a dryer on high heat for at least  10 minutes. If clothes are damp, additional time may be needed. If clothes require washing, use hot water.  · Examine gear and pets.  · Shower soon after being outdoors.  · Check your body for ticks. Conduct a full body check using a mirror.  What is the proper way to remove a tick?  If you find a tick on your body, remove it as soon as possible. Removing a tick sooner can prevent germs from passing to your body. Do not remove the tick with your bare fingers. To remove a tick that is crawling on your skin but has not bitten, use either of these methods:  · Go outdoors and brush the tick off.  · Remove the tick with tape or a lint roller.  To remove a tick that is attached to your skin:  1. Wash your hands. If you have latex gloves, put them on.  2. Use fine-tipped tweezers, curved forceps, or a tick-removal tool to gently grasp the tick as close to your skin and the tick's head as possible.  3. Gently pull with a steady, upward, even pressure until the tick lets go.  4. When removing the tick:  ? Take care to keep the tick's head attached to its body.  ? Do not twist or jerk the tick. This can make the tick's head or mouth parts break off and remain in the skin.  ? Do not squeeze or crush the tick's body. This could force disease-carrying fluids from the tick into your body.  Do not try to remove a tick with heat, alcohol, petroleum jelly, or fingernail polish. Using these methods can cause the tick to salivate and regurgitate into your bloodstream, increasing your risk of getting a disease.  What should I do after removing a tick?  · Dispose of the tick. Do not crush a tick with your fingers.  · Clean the bite area and your hands with soap and water, rubbing alcohol, or an iodine scrub.  · If an antiseptic cream or ointment is available, apply a small amount to the bite site.  · Wash and disinfect any instruments that you used to remove the tick.  How should I dispose of a tick?  To dispose of a live tick, use one  of these methods:  · Place it in rubbing alcohol.  · Place it in a sealed bag or container.  · Wrap it tightly in tape.  · Flush it down the toilet.  Contact a health care provider if:  · You have symptoms of a disease after a tick bite. Symptoms of a tick-borne disease can occur from moments after the tick bites to 30 days after a tick is removed. Symptoms include:  ? Fever or chills.  ? Any of these signs in the bite area:  § A red rash that makes a [place] (bull's-eye rash) in the bite area.  § Redness and swelling.  ? Headache.  ? Muscle, joint, or bone pain.  ? Abnormal tiredness.  ? Numbness in your legs or difficulty walking or moving your legs.  ? Tender, swollen lymph glands.  · A part of a tick breaks off and gets stuck in your skin.  Get help right away if:  · You are not able to remove a tick.  · You experience muscle weakness or paralysis.  · Your symptoms get worse or you experience new symptoms.  · You find an engorged tick on your skin and you are in an area where disease from ticks is a high risk.  Summary  · Ticks may carry germs that can spread to a person through a bite and cause a disease.  · Wear protective clothing and use insect repellent to prevent tick bites. Follow the instructions on the label.  · If you find a tick on your body, remove it as soon as possible. If the tick is attached, do not try to remove with heat, alcohol, petroleum jelly, or fingernail polish.  · Remove the attached tick using fine-tipped tweezers, curved forceps, or a tick-removal tool. Gently pull with steady, upward, even pressure until the tick lets go. Do not twist or jerk the tick. Do not squeeze or crush the tick's body.  · If you have symptoms of a disease after being bitten by a tick, contact a health care provider.  This information is not intended to replace advice given to you by your health care provider. Make sure you discuss any questions you have with your health care provider.  Document Revised:  12/14/2020 Document Reviewed: 12/14/2020  [employer] Patient Education © 2021 [employer]  Doxycycline tablets or capsules  What is this medicine?  DOXYCYCLINE (dox padilla rain) is a tetracycline antibiotic. It kills certain bacteria or stops their growth. It is used to treat many kinds of infections, like dental, skin, respiratory, and urinary tract infections. It also treats acne, Lyme disease, malaria, and certain sexually transmitted infections.  This medicine may be used for other purposes; ask your health care provider or pharmacist if you have questions.  COMMON BRAND NAME(S): Acticlate, Adoxa, Adoxa CK, Adoxa Felipe, Adoxa TT, Alodox, Avidoxy, Doxal, LYMEPAK, Mondoxyne NL, Monodox, Morgidox 1x, Morgidox 1x Kit, Morgidox 2x, Morgidox 2x Kit, NutriDox, Ocudox, Okebo, Periostat, TARGADOX, Vibra-Tabs, Vibramycin  What should I tell my health care provider before I take this medicine?  They need to know if you have any of these conditions:  · liver disease  · long exposure to sunlight like working outdoors  · stomach problems like colitis  · an unusual or allergic reaction to doxycycline, tetracycline antibiotics, other medicines, foods, dyes, or preservatives  · pregnant or trying to get pregnant  · breast-feeding  How should I use this medicine?  Take this medicine by mouth with a full glass of water. Follow the directions on the prescription label. It is best to take this medicine without food, but if it upsets your stomach take it with food. Take your medicine at regular intervals. Do not take your medicine more often than directed. Take all of your medicine as directed even if you think you are better. Do not skip doses or stop your medicine early.  Talk to your pediatrician regarding the use of this medicine in children. While this drug may be prescribed for selected conditions, precautions do apply.  Overdosage: If you think you have taken too much of this medicine contact a poison control center or emergency  room at once.  NOTE: This medicine is only for you. Do not share this medicine with others.  What if I miss a dose?  If you miss a dose, take it as soon as you can. If it is almost time for your next dose, take only that dose. Do not take double or extra doses.  What may interact with this medicine?  · antacids  · barbiturates  · birth control pills  · bismuth subsalicylate  · carbamazepine  · methoxyflurane  · other antibiotics  · phenytoin  · vitamins that contain iron  · warfarin  This list may not describe all possible interactions. Give your health care provider a list of all the medicines, herbs, non-prescription drugs, or dietary supplements you use. Also tell them if you smoke, drink alcohol, or use illegal drugs. Some items may interact with your medicine.  What should I watch for while using this medicine?  Tell your doctor or health care professional if your symptoms do not improve.  Do not treat diarrhea with over the counter products. Contact your doctor if you have diarrhea that lasts more than 2 days or if it is severe and watery.  Do not take this medicine just before going to bed. It may not dissolve properly when you lay down and can cause pain in your throat. Drink plenty of fluids while taking this medicine to also help reduce irritation in your throat.  This medicine can make you more sensitive to the sun. Keep out of the sun. If you cannot avoid being in the sun, wear protective clothing and use sunscreen. Do not use sun lamps or tanning beds/booths.  Birth control pills may not work properly while you are taking this medicine. Talk to your doctor about using an extra method of birth control.  If you are being treated for a sexually transmitted infection, avoid sexual contact until you have finished your treatment. Your sexual partner may also need treatment.  Avoid antacids, aluminum, calcium, magnesium, and iron products for 4 hours before and 2 hours after taking a dose of this medicine.  If  you are using this medicine to prevent malaria, you should still protect yourself from contact with mosquitos. Stay in screened-in areas, use mosquito nets, keep your body covered, and use an insect repellent.  What side effects may I notice from receiving this medicine?  Side effects that you should report to your doctor or health care professional as soon as possible:  · allergic reactions like skin rash, itching or hives, swelling of the face, lips, or tongue  · difficulty breathing  · fever  · itching in the rectal or genital area  · pain on swallowing  · rash, fever, and swollen lymph nodes  · redness, blistering, peeling or loosening of the skin, including inside the mouth  · severe stomach pain or cramps  · unusual bleeding or bruising  · unusually weak or tired  · yellowing of the eyes or skin  Side effects that usually do not require medical attention (report to your doctor or health care professional if they continue or are bothersome):  · diarrhea  · loss of appetite  · nausea, vomiting  This list may not describe all possible side effects. Call your doctor for medical advice about side effects. You may report side effects to FDA at 0-842-FDA-8500.  Where should I keep my medicine?  Keep out of the reach of children.  Store at room temperature, below 30 degrees C (86 degrees F). Protect from light. Keep container tightly closed. Throw away any unused medicine after the expiration date. Taking this medicine after the expiration date can make you seriously ill.  NOTE: This sheet is a summary. It may not cover all possible information. If you have questions about this medicine, talk to your doctor, pharmacist, or health care provider.  © 2021 [employer] (2020-03-19 13:44:53)  Mupirocin skin cream or ointment  What is this medicine?  MUPIROCIN (myoo PEER oh sin) is an antibiotic. It is used on the skin to treat skin infections.  This medicine may be used for other purposes; ask your health care  provider or pharmacist if you have questions.  COMMON BRAND NAME(S): Bactroban, Centany, Centany AT  What should I tell my health care provider before I take this medicine?  They need to know if you have any of these conditions:  · an unusual or allergic reaction to mupirocin, polyethylene glycol (PEG), or other topical antibiotic medicine  · pregnant or trying to get pregnant  · breast-feeding  How should I use this medicine?  This medicine is for external use only. Follow the directions on the prescription label. Wash your hands before and after use. Before applying, wash the affected area with mild soap and water and pat dry. Apply a small amount to the affected area and rub gently. You can cover the area with a gauze dressing. Do not get this medicine in your eyes. If you do, rinse out with plenty of cool tap water. Do not use your medicine more often than directed. Finish the full course of medicine prescribed by your doctor or health care professional even if you think your condition is better. Do not use over large areas of burnt skin.  Talk to your pediatrician regarding the use of this medicine in children. Special care may be needed.  Overdosage: If you think you have taken too much of this medicine contact a poison control center or emergency room at once.  NOTE: This medicine is only for you. Do not share this medicine with others.  What if I miss a dose?  If you miss a dose, take it as soon as you can. If it is almost time for your next dose, take only that dose. Do not take double or extra doses.  What may interact with this medicine?  Interactions are not expected. Do not use any other skin products on the affected area without telling your doctor or health care professional.  This list may not describe all possible interactions. Give your health care provider a list of all the medicines, herbs, non-prescription drugs, or dietary supplements you use. Also tell them if you smoke, drink alcohol, or use  illegal drugs. Some items may interact with your medicine.  What should I watch for while using this medicine?  Tell your doctor or health care professional if your skin condition does not begin to improve within 3 to 5 days.  What side effects may I notice from receiving this medicine?  Side effects that you should report to your doctor or health care professional as soon as possible:  · skin rash, redness, continued swelling, burning, itching, stinging, or pain  Side effects that usually do not require medical attention (report to your doctor or health care professional if they continue or are bothersome):  · dry skin, itching  This list may not describe all possible side effects. Call your doctor for medical advice about side effects. You may report side effects to FDA at 0-380-HPC-4013.  Where should I keep my medicine?  Keep out of the reach of children.  Store at room temperature between 20 and 25 degrees C (68 and 77 degrees F). Throw away any unused medicine after the expiration date.  NOTE: This sheet is a summary. It may not cover all possible information. If you have questions about this medicine, talk to your doctor, pharmacist, or health care provider.  © 2021 [employer] Standard (2009-07-06 14:38:18)

## 2021-06-14 NOTE — PROGRESS NOTES
Follow Up Office Note     Patient Name: Leila Pierre  : 1945   MRN: [id number]     Chief Complaint:    Chief Complaint   Patient presents with   • Tick Removal     tick bite       History of Present Illness: Leila Pierre is a 76 y.o. female who presents today with c/o tick bite left upper arm. Patient states that she though that she had a skin tag on her left upper arm and then realized that it was an embedded tick. She was able to get the tick out, but believes it was attached for 3-4 days. She has a raised, red skin lesion where the tick was attached.      Subjective      Review of Systems:   Review of Systems   Constitutional: Negative for activity change, appetite change, chills, diaphoresis, fatigue, fever and unexpected weight change.   Respiratory: Negative.    Cardiovascular: Negative.    Gastrointestinal: Negative.    Musculoskeletal: Negative.    Skin: Positive for wound.   Neurological: Negative.         Past Medical History:   Past Medical History:   Diagnosis Date   • Arthritis    • Diabetes mellitus (CMS/HCC)    • GERD (gastroesophageal reflux disease)    • Hyperlipidemia    • Hypertension    • Hypothyroidism    • Hypothyroidism    • Melanoma (CMS/HCC)    • Melanoma in situ of right lower leg (CMS/HCC)        • Non-toxic multinodular goiter    • Overweight (BMI 25.0-29.9)    • Thyroid disorder    • Type 2 diabetes mellitus without complication (CMS/HCC)          Medications:     Current Outpatient Medications:   •  amLODIPine (NORVASC) 5 MG tablet, TAKE ONE TABLET BY MOUTH TWICE A DAY, Disp: 180 tablet, Rfl: 0  •  aspirin 81 MG EC tablet, Take 81 mg by mouth Daily., Disp: , Rfl:   •  azilsartan medoxomil (Edarbi) 80 MG tablet tablet, Take 1 tablet by mouth Daily., Disp: 90 tablet, Rfl: 0  •  baclofen (LIORESAL) 10 MG tablet, Take 1 tablet by mouth 3 (Three) Times a Day., Disp: 90 tablet, Rfl: 2  •  BIOTIN 5000 PO, Take 1 tablet by mouth Daily., Disp: , Rfl:   •  Calcium  Carbonate (CALCIUM 600 PO), Take 1 tablet by mouth Daily., Disp: , Rfl:   •  carvedilol (COREG) 25 MG tablet, Take 1 tablet by mouth 2 (Two) Times a Day With Meals., Disp: 180 tablet, Rfl: 1  •  cholecalciferol (VITAMIN D3) 1000 UNITS tablet, Take 1,000 Units by mouth Daily., Disp: , Rfl:   •  cloNIDine (CATAPRES-TTS) 0.2 MG/24HR patch, Place 1 patch on the skin as directed by provider 1 (One) Time Per Week., Disp: 12 patch, Rfl: 3  •  famotidine (PEPCID) 20 MG tablet, Take 20 mg by mouth 2 (Two) Times a Day., Disp: , Rfl:   •  fexofenadine (ALLEGRA) 180 MG tablet, Take 180 mg by mouth Daily., Disp: , Rfl:   •  furosemide (LASIX) 20 MG tablet, TAKE ONE TABLET BY MOUTH TWICE A DAY, Disp: 180 tablet, Rfl: 0  •  glucose blood (Accu-Chek Guide) test strip, Use as instructed daily, Disp: 100 each, Rfl: 1  •  Lancets (accu-chek multiclix) lancets, PRN, Disp: 102 each, Rfl: 3  •  levothyroxine (SYNTHROID, LEVOTHROID) 112 MCG tablet, , Disp: , Rfl:   •  metFORMIN (GLUCOPHAGE) 500 MG tablet, TAKE TWO TABLETS BY MOUTH TWICE A DAY WITH MEALS, Disp: 360 tablet, Rfl: 3  •  potassium chloride (K-DUR,KLOR-CON) 10 MEQ CR tablet, TAKE ONE TABLET BY MOUTH TWICE A DAY, Disp: 180 tablet, Rfl: 1  •  doxycycline (MONODOX) 100 MG capsule, Take 1 capsule by mouth 2 (Two) Times a Day for 10 days., Disp: 20 capsule, Rfl: 0  •  mupirocin (Bactroban) 2 % ointment, Apply  topically to the appropriate area as directed 3 (Three) Times a Day., Disp: 30 g, Rfl: 0    Allergies:   Allergies   Allergen Reactions   • Clarithromycin    • Decongest-Aid [Pseudoephedrine]      Gives too much energy and patient is unable to sleep   • Irbesartan    • Lescol [Fluvastatin Sodium] Diarrhea   • Lipitor [Atorvastatin] Myalgia   • Other      bandaids    • Sulfa Antibiotics Unknown (See Comments)   • Valsartan          Objective     Physical Exam:  Vital Signs:   Vitals:    06/12/21 1356   BP: 118/78   Pulse: 72   Temp: 98.7 °F (37.1 °C)   SpO2: 96%   Weight: 78.4  "kg (172 lb 12.8 oz)   Height: 165.1 cm (65\")     Body mass index is 28.76 kg/m².     Physical Exam  Vitals and nursing note reviewed.   Constitutional:       General: She is not in acute distress.     Appearance: Normal appearance. She is well-developed. She is not ill-appearing, toxic-appearing or diaphoretic.   HENT:      Head: Normocephalic and atraumatic.   Cardiovascular:      Rate and Rhythm: Normal rate and regular rhythm.      Heart sounds: Normal heart sounds.   Pulmonary:      Effort: Pulmonary effort is normal. No respiratory distress.      Breath sounds: Normal breath sounds. No stridor. No wheezing.   Skin:     General: Skin is warm and dry.      Findings: Lesion present.      Comments: Erythematous, raised skin lesion left upper arm. Area mildly tender to palpation. No evidence of retained tick body parts. No drainage.    Neurological:      General: No focal deficit present.      Mental Status: She is alert and oriented to person, place, and time.   Psychiatric:         Mood and Affect: Mood normal.         Behavior: Behavior normal. Behavior is cooperative.         Thought Content: Thought content normal.         Judgment: Judgment normal.         Assessment / Plan      Assessment/Plan:   Diagnoses and all orders for this visit:    1. Tick bite of left upper arm, initial encounter (Primary)  -     doxycycline (MONODOX) 100 MG capsule; Take 1 capsule by mouth 2 (Two) Times a Day for 10 days.  Dispense: 20 capsule; Refill: 0  -     mupirocin (Bactroban) 2 % ointment; Apply  topically to the appropriate area as directed 3 (Three) Times a Day.  Dispense: 30 g; Refill: 0    2. Bacterial skin infection  -     doxycycline (MONODOX) 100 MG capsule; Take 1 capsule by mouth 2 (Two) Times a Day for 10 days.  Dispense: 20 capsule; Refill: 0  -     mupirocin (Bactroban) 2 % ointment; Apply  topically to the appropriate area as directed 3 (Three) Times a Day.  Dispense: 30 g; Refill: 0       Follow Up:   PRN and at " next scheduled appointment(s) with PCP.    Discussed the nature of the medical condition(s) risks, complications, implications, management, safe and proper use of medications. Encouraged medication compliance, and keeping scheduled follow up appointments with me and any other providers.      RTC if symptoms fail to improve, to ER if symptoms worsen.      RODRI Michale  [facility] [place]       Please note that portions of this note may have been completed with a voice recognition program. Efforts were made to edit the dictations, but occasionally words are mistranscribed.

## 2021-06-22 ENCOUNTER — OFFICE VISIT (OUTPATIENT)
Dept: FAMILY MEDICINE CLINIC | Facility: CLINIC | Age: 76
End: 2021-06-22

## 2021-06-22 VITALS
HEIGHT: 65 IN | TEMPERATURE: 97.5 F | WEIGHT: 169.6 LBS | BODY MASS INDEX: 28.26 KG/M2 | DIASTOLIC BLOOD PRESSURE: 72 MMHG | HEART RATE: 62 BPM | OXYGEN SATURATION: 97 % | SYSTOLIC BLOOD PRESSURE: 130 MMHG

## 2021-06-22 DIAGNOSIS — E11.9 TYPE 2 DIABETES MELLITUS WITHOUT COMPLICATION, WITHOUT LONG-TERM CURRENT USE OF INSULIN (HCC): ICD-10-CM

## 2021-06-22 DIAGNOSIS — Z00.00 MEDICARE ANNUAL WELLNESS VISIT, SUBSEQUENT: Primary | ICD-10-CM

## 2021-06-22 DIAGNOSIS — N95.9 POST MENOPAUSAL PROBLEMS: ICD-10-CM

## 2021-06-22 DIAGNOSIS — E78.01 FAMILIAL HYPERCHOLESTEROLEMIA: ICD-10-CM

## 2021-06-22 DIAGNOSIS — I10 ESSENTIAL HYPERTENSION: ICD-10-CM

## 2021-06-22 DIAGNOSIS — E55.9 VITAMIN D DEFICIENCY: ICD-10-CM

## 2021-06-22 DIAGNOSIS — E03.9 HYPOTHYROIDISM, ADULT: ICD-10-CM

## 2021-06-22 LAB
25(OH)D3 SERPL-MCNC: 41.4 NG/ML
ALBUMIN SERPL-MCNC: 4.9 G/DL (ref 3.5–5.2)
ALBUMIN/GLOB SERPL: 2.5 G/DL
ALP SERPL-CCNC: 44 U/L (ref 39–117)
ALT SERPL W P-5'-P-CCNC: 27 U/L (ref 1–33)
ANION GAP SERPL CALCULATED.3IONS-SCNC: 11.5 MMOL/L (ref 5–15)
AST SERPL-CCNC: 21 U/L (ref 1–32)
BILIRUB BLD-MCNC: NEGATIVE MG/DL
BILIRUB SERPL-MCNC: 0.6 MG/DL (ref 0–1.2)
BUN SERPL-MCNC: 12 MG/DL (ref 8–23)
BUN/CREAT SERPL: 16.4 (ref 7–25)
CALCIUM SPEC-SCNC: 9.5 MG/DL (ref 8.6–10.5)
CHLORIDE SERPL-SCNC: 106 MMOL/L (ref 98–107)
CHOLEST SERPL-MCNC: 200 MG/DL (ref 0–200)
CLARITY, POC: CLEAR
CO2 SERPL-SCNC: 23.5 MMOL/L (ref 22–29)
COLOR UR: YELLOW
CREAT SERPL-MCNC: 0.73 MG/DL (ref 0.57–1)
DEPRECATED RDW RBC AUTO: 42.9 FL (ref 37–54)
ERYTHROCYTE [DISTWIDTH] IN BLOOD BY AUTOMATED COUNT: 12.9 % (ref 12.3–15.4)
EXPIRATION DATE: NORMAL
GFR SERPL CREATININE-BSD FRML MDRD: 78 ML/MIN/1.73
GLOBULIN UR ELPH-MCNC: 2 GM/DL
GLUCOSE SERPL-MCNC: 118 MG/DL (ref 65–99)
GLUCOSE UR STRIP-MCNC: NEGATIVE MG/DL
HCT VFR BLD AUTO: 41.7 % (ref 34–46.6)
HDLC SERPL-MCNC: 41 MG/DL (ref 40–60)
HGB BLD-MCNC: 14.4 G/DL (ref 12–15.9)
KETONES UR QL: NEGATIVE
LDLC SERPL CALC-MCNC: 133 MG/DL (ref 0–100)
LDLC/HDLC SERPL: 3.16 {RATIO}
LEUKOCYTE EST, POC: NEGATIVE
Lab: 7004
MCH RBC QN AUTO: 31.7 PG (ref 26.6–33)
MCHC RBC AUTO-ENTMCNC: 34.5 G/DL (ref 31.5–35.7)
MCV RBC AUTO: 91.9 FL (ref 79–97)
NITRITE UR-MCNC: NEGATIVE MG/ML
PH UR: 5.5 [PH] (ref 5–8)
PLATELET # BLD AUTO: 188 10*3/MM3 (ref 140–450)
PMV BLD AUTO: 13.4 FL (ref 6–12)
POC CREATININE URINE: ABNORMAL
POC MICROALBUMIN URINE: ABNORMAL
POTASSIUM SERPL-SCNC: 4.5 MMOL/L (ref 3.5–5.2)
PROT SERPL-MCNC: 6.9 G/DL (ref 6–8.5)
PROT UR STRIP-MCNC: NEGATIVE MG/DL
RBC # BLD AUTO: 4.54 10*6/MM3 (ref 3.77–5.28)
RBC # UR STRIP: NEGATIVE /UL
SODIUM SERPL-SCNC: 141 MMOL/L (ref 136–145)
SP GR UR: 1.02 (ref 1–1.03)
TRIGL SERPL-MCNC: 147 MG/DL (ref 0–150)
TSH SERPL DL<=0.05 MIU/L-ACNC: 4.75 UIU/ML (ref 0.27–4.2)
UROBILINOGEN UR QL: NORMAL
VLDLC SERPL-MCNC: 26 MG/DL (ref 5–40)
WBC # BLD AUTO: 6.71 10*3/MM3 (ref 3.4–10.8)

## 2021-06-22 PROCEDURE — 1170F FXNL STATUS ASSESSED: CPT | Performed by: FAMILY MEDICINE

## 2021-06-22 PROCEDURE — 93000 ELECTROCARDIOGRAM COMPLETE: CPT | Performed by: FAMILY MEDICINE

## 2021-06-22 PROCEDURE — 80061 LIPID PANEL: CPT | Performed by: FAMILY MEDICINE

## 2021-06-22 PROCEDURE — 80053 COMPREHEN METABOLIC PANEL: CPT | Performed by: FAMILY MEDICINE

## 2021-06-22 PROCEDURE — 90714 TD VACC NO PRESV 7 YRS+ IM: CPT | Performed by: FAMILY MEDICINE

## 2021-06-22 PROCEDURE — 82306 VITAMIN D 25 HYDROXY: CPT | Performed by: FAMILY MEDICINE

## 2021-06-22 PROCEDURE — 1159F MED LIST DOCD IN RCRD: CPT | Performed by: FAMILY MEDICINE

## 2021-06-22 PROCEDURE — 90471 IMMUNIZATION ADMIN: CPT | Performed by: FAMILY MEDICINE

## 2021-06-22 PROCEDURE — 85027 COMPLETE CBC AUTOMATED: CPT | Performed by: FAMILY MEDICINE

## 2021-06-22 PROCEDURE — 81003 URINALYSIS AUTO W/O SCOPE: CPT | Performed by: FAMILY MEDICINE

## 2021-06-22 PROCEDURE — 84443 ASSAY THYROID STIM HORMONE: CPT | Performed by: FAMILY MEDICINE

## 2021-06-22 PROCEDURE — G0439 PPPS, SUBSEQ VISIT: HCPCS | Performed by: FAMILY MEDICINE

## 2021-06-22 PROCEDURE — 82044 UR ALBUMIN SEMIQUANTITATIVE: CPT | Performed by: FAMILY MEDICINE

## 2021-06-28 RX ORDER — BACLOFEN 10 MG/1
TABLET ORAL
Qty: 90 TABLET | Refills: 1 | Status: SHIPPED | OUTPATIENT
Start: 2021-06-28 | End: 2021-09-22

## 2021-06-28 RX ORDER — FUROSEMIDE 20 MG/1
TABLET ORAL
Qty: 180 TABLET | Refills: 0 | Status: SHIPPED | OUTPATIENT
Start: 2021-06-28 | End: 2021-09-23

## 2021-06-30 RX ORDER — CARVEDILOL 25 MG/1
TABLET ORAL
Qty: 180 TABLET | Refills: 3 | Status: SHIPPED | OUTPATIENT
Start: 2021-06-30 | End: 2022-06-21

## 2021-07-06 RX ORDER — BLOOD SUGAR DIAGNOSTIC
STRIP MISCELLANEOUS
Qty: 100 EACH | Refills: 1 | Status: SHIPPED | OUTPATIENT
Start: 2021-07-06 | End: 2022-01-03

## 2021-07-10 RX ORDER — AMLODIPINE BESYLATE 5 MG/1
TABLET ORAL
Qty: 180 TABLET | Refills: 1 | Status: SHIPPED | OUTPATIENT
Start: 2021-07-10 | End: 2022-01-08

## 2021-07-29 ENCOUNTER — TRANSCRIBE ORDERS (OUTPATIENT)
Dept: ADMINISTRATIVE | Facility: HOSPITAL | Age: 76
End: 2021-07-29

## 2021-07-29 DIAGNOSIS — Z12.31 VISIT FOR SCREENING MAMMOGRAM: Primary | ICD-10-CM

## 2021-08-05 ENCOUNTER — OFFICE VISIT (OUTPATIENT)
Dept: ENDOCRINOLOGY | Facility: CLINIC | Age: 76
End: 2021-08-05

## 2021-08-05 VITALS
BODY MASS INDEX: 27.16 KG/M2 | WEIGHT: 163 LBS | HEIGHT: 65 IN | DIASTOLIC BLOOD PRESSURE: 68 MMHG | HEART RATE: 67 BPM | OXYGEN SATURATION: 99 % | SYSTOLIC BLOOD PRESSURE: 142 MMHG

## 2021-08-05 DIAGNOSIS — E11.9 TYPE 2 DIABETES MELLITUS WITHOUT COMPLICATION, WITHOUT LONG-TERM CURRENT USE OF INSULIN (HCC): Primary | ICD-10-CM

## 2021-08-05 DIAGNOSIS — E03.9 HYPOTHYROIDISM, ADULT: ICD-10-CM

## 2021-08-05 DIAGNOSIS — I10 ESSENTIAL HYPERTENSION: ICD-10-CM

## 2021-08-05 LAB
EXPIRATION DATE: NORMAL
EXPIRATION DATE: NORMAL
GLUCOSE BLDC GLUCOMTR-MCNC: 78 MG/DL (ref 70–130)
HBA1C MFR BLD: 5.7 %
Lab: NORMAL
Lab: NORMAL

## 2021-08-05 PROCEDURE — 83036 HEMOGLOBIN GLYCOSYLATED A1C: CPT | Performed by: INTERNAL MEDICINE

## 2021-08-05 PROCEDURE — 82947 ASSAY GLUCOSE BLOOD QUANT: CPT | Performed by: INTERNAL MEDICINE

## 2021-08-05 PROCEDURE — 99214 OFFICE O/P EST MOD 30 MIN: CPT | Performed by: INTERNAL MEDICINE

## 2021-08-05 RX ORDER — LEVOTHYROXINE SODIUM 0.12 MG/1
125 TABLET ORAL DAILY
Qty: 90 TABLET | Refills: 3 | Status: SHIPPED | OUTPATIENT
Start: 2021-08-05 | End: 2022-07-11

## 2021-08-05 NOTE — ASSESSMENT & PLAN NOTE
Hypertension is unchanged.  SBP a bit elevated today.  Continue current treatment regimen.  Monitor BP at home.  Blood pressure will be reassessed at the next regular appointment.

## 2021-08-05 NOTE — PROGRESS NOTES
"     Office Note      Date: 2021  Patient Name: Leila Pierre  MRN: [id number]  : 1945    Chief Complaint   Patient presents with   • Diabetes       History of Present Illness:   Leila Pierre is a 76 y.o. female who presents for Diabetes type 2. Diagnosed in: . Treated in past with oral agents. Current treatments: metformin. Number of insulin shots per day: none. Checks blood sugar 1 times a day. Has low blood sugar: no. Aspirin use: Yes. Statin use: No - intolerant. ACE-I/ARB use: Yes. Changes in health since last visit: none. Last eye exam .     She remains on T4 112mcg qd. She is taking this correctly. She isn't taking any interfering meds concurrently. She denies any sxs of hypo- or hyperthyroidism at this time. She hasn't noted any change in the size of her neck. She denies any compressive sxs.    Subjective      Diabetic Complications:  Eyes: No  Kidneys: No  Feet: No  Heart: No    Diet and Exercise:  Meals per day: 3  Minutes of exercise per week: 0 mins.    Review of Systems:   Review of Systems   Constitutional: Negative.    Cardiovascular: Negative.    Gastrointestinal: Negative.    Endocrine: Negative.        The following portions of the patient's history were reviewed and updated as appropriate: allergies, current medications, past family history, past medical history, past social history, past surgical history and problem list.    Objective       Visit Vitals  /68 (BP Location: Left arm, Patient Position: Sitting, Cuff Size: Adult)   Pulse 67   Ht 165.1 cm (65\")   Wt 73.9 kg (163 lb)   LMP  (LMP Unknown)   SpO2 99%   BMI 27.12 kg/m²       Physical Exam:  Physical Exam  Constitutional:       Appearance: Normal appearance.   Neurological:      Mental Status: She is alert.         Labs:    HbA1c  Lab Results   Component Value Date    HGBA1C 5.7 2021       CMP  Lab Results   Component Value Date    GLUCOSE 118 (H) 2021    BUN 12 2021    CREATININE " 0.73 06/22/2021    EGFRIFNONA 78 06/22/2021    EGFRIFAFRI 77 02/26/2015    BCR 16.4 06/22/2021    K 4.5 06/22/2021    CO2 23.5 06/22/2021    CALCIUM 9.5 06/22/2021    PROTENTOTREF 6.9 02/26/2015    LABIL2 2.5 02/26/2015    AST 21 06/22/2021    ALT 27 06/22/2021        Lipid Panel  Lab Results   Component Value Date    CHLPL 223 (H) 09/01/2015    HDL 41 06/22/2021     (H) 06/22/2021    TRIG 147 06/22/2021        TSH  Lab Results   Component Value Date    TSH 4.750 (H) 06/22/2021        Hemoglobin A1C  Lab Results   Component Value Date    HGBA1C 5.7 08/05/2021        Microalbumin/Creatinine  No results found for: MALBCRERATIO, CREATINIURIN, MICROALBUR        Assessment / Plan      Assessment & Plan:  Diagnoses and all orders for this visit:    1. Type 2 diabetes mellitus without complication, without long-term current use of insulin (CMS/[facility]) (Primary)  Assessment & Plan:  Diabetes is improving with treatment.   Continue current treatment regimen.  Diabetes will be reassessed in 3 months.    Orders:  -     POC Glycosylated Hemoglobin (Hb A1C)  -     POC Glucose, Blood    2. Essential hypertension  Assessment & Plan:  Hypertension is unchanged.  SBP a bit elevated today.  Continue current treatment regimen.  Monitor BP at home.  Blood pressure will be reassessed at the next regular appointment.      3. Hypothyroidism, adult  Assessment & Plan:  Recent TSH above goal. Increase T4 dose.        Other orders  -     levothyroxine (SYNTHROID, LEVOTHROID) 125 MCG tablet; Take 1 tablet by mouth Daily.  Dispense: 90 tablet; Refill: 3      Return in about 3 months (around 11/5/2021) for Recheck with A1c, TSH.    Ramírez Calabrese MD   08/05/2021

## 2021-08-22 DIAGNOSIS — I10 HTN (HYPERTENSION), MALIGNANT: ICD-10-CM

## 2021-08-22 DIAGNOSIS — E78.5 DYSLIPIDEMIA: ICD-10-CM

## 2021-08-23 RX ORDER — AZILSARTAN KAMEDOXOMIL 80 MG/1
TABLET ORAL
Qty: 90 TABLET | Refills: 3 | Status: SHIPPED | OUTPATIENT
Start: 2021-08-23 | End: 2022-01-17 | Stop reason: SDUPTHER

## 2021-08-27 NOTE — TELEPHONE ENCOUNTER
Sees PCP only, will defer further refills.   Lab Results   Component Value Date    GLUCOSE 118 (H) 06/22/2021    BUN 12 06/22/2021    CREATININE 0.73 06/22/2021    EGFRIFNONA 78 06/22/2021    EGFRIFAFRI 77 02/26/2015    BCR 16.4 06/22/2021    K 4.5 06/22/2021    CO2 23.5 06/22/2021    CALCIUM 9.5 06/22/2021    PROTENTOTREF 6.9 02/26/2015    ALBUMIN 4.90 06/22/2021    LABIL2 2.5 02/26/2015    AST 21 06/22/2021    ALT 27 06/22/2021       within normal limits

## 2021-09-17 ENCOUNTER — HOSPITAL ENCOUNTER (OUTPATIENT)
Dept: BONE DENSITY | Facility: HOSPITAL | Age: 76
Discharge: HOME | End: 2021-09-17
Admitting: FAMILY MEDICINE

## 2021-09-17 DIAGNOSIS — N95.9 POST MENOPAUSAL PROBLEMS: ICD-10-CM

## 2021-09-17 PROCEDURE — 77080 DXA BONE DENSITY AXIAL: CPT

## 2021-09-22 RX ORDER — BACLOFEN 10 MG/1
TABLET ORAL
Qty: 90 TABLET | Refills: 1 | Status: SHIPPED | OUTPATIENT
Start: 2021-09-22 | End: 2021-12-21

## 2021-09-23 RX ORDER — FUROSEMIDE 20 MG/1
TABLET ORAL
Qty: 180 TABLET | Refills: 0 | Status: SHIPPED | OUTPATIENT
Start: 2021-09-23 | End: 2021-12-21

## 2021-10-25 ENCOUNTER — OFFICE VISIT (OUTPATIENT)
Dept: FAMILY MEDICINE CLINIC | Facility: CLINIC | Age: 76
End: 2021-10-25

## 2021-10-25 VITALS
HEIGHT: 65 IN | WEIGHT: 168.6 LBS | SYSTOLIC BLOOD PRESSURE: 104 MMHG | RESPIRATION RATE: 16 BRPM | BODY MASS INDEX: 28.09 KG/M2 | TEMPERATURE: 97.7 F | HEART RATE: 64 BPM | DIASTOLIC BLOOD PRESSURE: 62 MMHG | OXYGEN SATURATION: 96 %

## 2021-10-25 DIAGNOSIS — E78.01 FAMILIAL HYPERCHOLESTEROLEMIA: ICD-10-CM

## 2021-10-25 DIAGNOSIS — I10 ESSENTIAL HYPERTENSION: ICD-10-CM

## 2021-10-25 DIAGNOSIS — Z12.11 COLON CANCER SCREENING: ICD-10-CM

## 2021-10-25 DIAGNOSIS — E11.9 TYPE 2 DIABETES MELLITUS WITHOUT COMPLICATION, WITHOUT LONG-TERM CURRENT USE OF INSULIN (HCC): Primary | ICD-10-CM

## 2021-10-25 PROCEDURE — 99214 OFFICE O/P EST MOD 30 MIN: CPT | Performed by: FAMILY MEDICINE

## 2021-10-25 NOTE — PROGRESS NOTES
Chief Complaint  Hyperlipidemia and Hypertension    Subjective          Leila Pierre presents to [facility]  Hyperlipidemia  This is a chronic problem. The problem is controlled. Current antihyperlipidemic treatment includes diet change and exercise. The current treatment provides moderate improvement of lipids. There are no compliance problems.    Hypertension  This is a chronic problem. The current episode started more than 1 year ago. The problem is unchanged. The problem is controlled. Pertinent negatives include no headaches or palpitations. Risk factors for coronary artery disease include post-menopausal state, sedentary lifestyle and stress. Current antihypertension treatment includes central alpha agonists, calcium channel blockers, angiotensin blockers, beta blockers and diuretics. The current treatment provides significant improvement. There are no compliance problems.  There is no history of angina, kidney disease, CAD/MI or left ventricular hypertrophy.   Diabetes  She presents for her follow-up diabetic visit. She has type 2 diabetes mellitus. Her disease course has been stable. There are no hypoglycemic associated symptoms. Pertinent negatives for hypoglycemia include no dizziness or headaches. Pertinent negatives for diabetes include no fatigue, no polydipsia, no polyphagia, no polyuria, no visual change and no weakness. There are no hypoglycemic complications. Symptoms are stable. There are no diabetic complications. Risk factors for coronary artery disease include obesity, post-menopausal and sedentary lifestyle. Current diabetic treatment includes oral agent (monotherapy). She is compliant with treatment most of the time. Her weight is stable. An ACE inhibitor/angiotensin II receptor blocker is being taken. She does not see a podiatrist.Eye exam is current.       Objective   Vital Signs:   /62   Pulse 64   Temp 97.7 °F (36.5 °C)   Resp 16   Ht 165.1  "cm (65\")   Wt 76.5 kg (168 lb 9.6 oz)   SpO2 96%   BMI 28.06 kg/m²     Physical Exam  Vitals and nursing note reviewed.   Constitutional:       Appearance: Normal appearance. She is well-developed.   HENT:      Head: Normocephalic and atraumatic.      Right Ear: External ear normal.      Left Ear: External ear normal.      Nose: Nose normal.   Eyes:      General: No scleral icterus.     Conjunctiva/sclera: Conjunctivae normal.      Pupils: Pupils are equal, round, and reactive to light.   Neck:      Thyroid: No thyromegaly.      Vascular: No carotid bruit.   Cardiovascular:      Rate and Rhythm: Normal rate and regular rhythm.      Pulses: Normal pulses.      Heart sounds: Normal heart sounds.   Pulmonary:      Effort: Pulmonary effort is normal.      Breath sounds: Normal breath sounds.   Musculoskeletal:         General: Normal range of motion.      Cervical back: Neck supple.      Right lower leg: No edema.      Left lower leg: No edema.   Skin:     General: Skin is warm and dry.      Findings: No rash.   Neurological:      General: No focal deficit present.      Mental Status: She is alert and oriented to person, place, and time.      Comments: No focal deficits no lateralizing signs   Psychiatric:         Mood and Affect: Mood normal.         Behavior: Behavior is cooperative.        Result Review :{Labs  Result Review  Imaging  Med Tab  Media  Procedures :23}                 Assessment and Plan    Diagnoses and all orders for this visit:    1. Type 2 diabetes mellitus without complication, without long-term current use of insulin (HCC) (Primary)  -     Cancel: POC Glycosylated Hemoglobin (Hb A1C)  -     metFORMIN (GLUCOPHAGE) 500 MG tablet; Take 2 tablets by mouth 2 (Two) Times a Day With Meals.  Dispense: 360 tablet; Refill: 3    2. Essential hypertension    3. Colon cancer screening  -     Cologuard - Stool, Per Rectum; Future    4. Familial hypercholesterolemia        Follow Up   Return in about 4 " months (around 2/25/2022) for Recheck, fasting appt.  Patient was given instructions and counseling regarding her condition or for health maintenance advice. Please see specific information pulled into the AVS if appropriate.

## 2021-10-26 ENCOUNTER — HOSPITAL ENCOUNTER (OUTPATIENT)
Dept: MAMMOGRAPHY | Facility: HOSPITAL | Age: 76
Discharge: HOME | End: 2021-10-26

## 2021-10-26 DIAGNOSIS — Z12.31 VISIT FOR SCREENING MAMMOGRAM: ICD-10-CM

## 2021-11-18 RX ORDER — POTASSIUM CHLORIDE 750 MG/1
TABLET, EXTENDED RELEASE ORAL
Qty: 180 TABLET | Refills: 1 | Status: SHIPPED | OUTPATIENT
Start: 2021-11-18 | End: 2022-05-15

## 2021-12-07 ENCOUNTER — HOSPITAL ENCOUNTER (OUTPATIENT)
Dept: MAMMOGRAPHY | Facility: HOSPITAL | Age: 76
Discharge: HOME | End: 2021-12-07
Admitting: FAMILY MEDICINE

## 2021-12-07 PROCEDURE — 77067 SCR MAMMO BI INCL CAD: CPT

## 2021-12-07 PROCEDURE — 77067 SCR MAMMO BI INCL CAD: CPT | Performed by: RADIOLOGY

## 2021-12-07 PROCEDURE — 77063 BREAST TOMOSYNTHESIS BI: CPT

## 2021-12-07 PROCEDURE — 77063 BREAST TOMOSYNTHESIS BI: CPT | Performed by: RADIOLOGY

## 2021-12-21 RX ORDER — BACLOFEN 10 MG/1
TABLET ORAL
Qty: 90 TABLET | Refills: 0 | Status: SHIPPED | OUTPATIENT
Start: 2021-12-21 | End: 2022-01-31

## 2021-12-21 RX ORDER — FUROSEMIDE 20 MG/1
TABLET ORAL
Qty: 180 TABLET | Refills: 0 | Status: SHIPPED | OUTPATIENT
Start: 2021-12-21 | End: 2022-03-17

## 2021-12-28 ENCOUNTER — LAB (OUTPATIENT)
Dept: LAB | Facility: HOSPITAL | Age: 76
End: 2021-12-28

## 2021-12-28 ENCOUNTER — OFFICE VISIT (OUTPATIENT)
Dept: ENDOCRINOLOGY | Facility: CLINIC | Age: 76
End: 2021-12-28

## 2021-12-28 VITALS
DIASTOLIC BLOOD PRESSURE: 60 MMHG | BODY MASS INDEX: 28.66 KG/M2 | HEIGHT: 65 IN | OXYGEN SATURATION: 96 % | HEART RATE: 72 BPM | SYSTOLIC BLOOD PRESSURE: 142 MMHG | WEIGHT: 172 LBS

## 2021-12-28 DIAGNOSIS — E03.9 HYPOTHYROIDISM, ADULT: ICD-10-CM

## 2021-12-28 DIAGNOSIS — I10 ESSENTIAL HYPERTENSION: ICD-10-CM

## 2021-12-28 DIAGNOSIS — E11.9 TYPE 2 DIABETES MELLITUS WITHOUT COMPLICATION, WITHOUT LONG-TERM CURRENT USE OF INSULIN (HCC): Primary | ICD-10-CM

## 2021-12-28 LAB
EXPIRATION DATE: ABNORMAL
EXPIRATION DATE: NORMAL
GLUCOSE BLDC GLUCOMTR-MCNC: 185 MG/DL (ref 70–130)
HBA1C MFR BLD: 6.1 %
Lab: ABNORMAL
Lab: NORMAL

## 2021-12-28 PROCEDURE — 82947 ASSAY GLUCOSE BLOOD QUANT: CPT | Performed by: INTERNAL MEDICINE

## 2021-12-28 PROCEDURE — 99214 OFFICE O/P EST MOD 30 MIN: CPT | Performed by: INTERNAL MEDICINE

## 2021-12-28 PROCEDURE — 3044F HG A1C LEVEL LT 7.0%: CPT | Performed by: INTERNAL MEDICINE

## 2021-12-28 PROCEDURE — 84443 ASSAY THYROID STIM HORMONE: CPT

## 2021-12-28 PROCEDURE — 83036 HEMOGLOBIN GLYCOSYLATED A1C: CPT | Performed by: INTERNAL MEDICINE

## 2021-12-28 RX ORDER — CLONIDINE 0.2 MG/24H
PATCH, EXTENDED RELEASE TRANSDERMAL
Qty: 12 PATCH | Refills: 3 | Status: SHIPPED | OUTPATIENT
Start: 2021-12-28 | End: 2022-11-21

## 2021-12-28 NOTE — ASSESSMENT & PLAN NOTE
Hypertension is unchanged.  SBP a bit elevated.  Continue current treatment regimen.  Monitor BP at home.  Blood pressure will be reassessed at the next regular appointment.

## 2021-12-28 NOTE — ASSESSMENT & PLAN NOTE
Diabetes is worsening. A1c has crept up but still okay at 6.1%.  Continue current treatment regimen.  Diabetes will be reassessed in 3 months.

## 2021-12-28 NOTE — PROGRESS NOTES
"     Office Note      Date: 2021  Patient Name: Leila Pierre  MRN: [id number]  : 1945    Chief Complaint   Patient presents with   • Diabetes     Type II       History of Present Illness:   Leila Pierre is a 76 y.o. female who presents for Diabetes type 2. Diagnosed in: . Treated in past with oral agents. Current treatments: metformin. Number of insulin shots per day: none. Checks blood sugar 1 times a day. Has low blood sugar: no. Aspirin use: Yes. Statin use: No - intolerant. ACE-I/ARB use: Yes. Changes in health since last visit: none. Last eye exam 2021.     She remains on T4 125mcg qd. She is taking this correctly. She isn't taking any interfering meds concurrently. She denies any sxs of hypo- or hyperthyroidism at this time. She hasn't noted any change in the size of her neck. She denies any compressive sxs.    Subjective      Diabetic Complications:  Eyes: No  Kidneys: No  Feet: No  Heart: No    Diet and Exercise:  Meals per day: 3  Minutes of exercise per week: 0 mins.    Review of Systems:   Review of Systems   Constitutional: Negative.    Cardiovascular: Negative.    Gastrointestinal: Negative.    Endocrine: Negative.        The following portions of the patient's history were reviewed and updated as appropriate: allergies, current medications, past family history, past medical history, past social history, past surgical history and problem list.    Objective       Visit Vitals  /60 (BP Location: Left arm, Patient Position: Sitting, Cuff Size: Adult)   Pulse 72   Ht 165.1 cm (65\")   Wt 78 kg (172 lb)   LMP  (LMP Unknown)   SpO2 96%   BMI 28.62 kg/m²       Physical Exam:  Physical Exam  Constitutional:       Appearance: Normal appearance.   Neurological:      Mental Status: She is alert.         Labs:    HbA1c  Lab Results   Component Value Date    HGBA1C 6.1 2021       CMP  Lab Results   Component Value Date    GLUCOSE 118 (H) 2021    BUN 12 2021    " CREATININE 0.73 06/22/2021    EGFRIFNONA 78 06/22/2021    EGFRIFAFRI 77 02/26/2015    BCR 16.4 06/22/2021    K 4.5 06/22/2021    CO2 23.5 06/22/2021    CALCIUM 9.5 06/22/2021    PROTENTOTREF 6.9 02/26/2015    LABIL2 2.5 02/26/2015    AST 21 06/22/2021    ALT 27 06/22/2021        Lipid Panel  Lab Results   Component Value Date    CHLPL 223 (H) 09/01/2015    HDL 41 06/22/2021     (H) 06/22/2021    TRIG 147 06/22/2021        TSH  Lab Results   Component Value Date    TSH 4.750 (H) 06/22/2021        Hemoglobin A1C  Lab Results   Component Value Date    HGBA1C 6.1 12/28/2021        Microalbumin/Creatinine  No results found for: MALBCRERATIO, CREATINIURIN, MICROALBUR        Assessment / Plan      Assessment & Plan:  Diagnoses and all orders for this visit:    1. Type 2 diabetes mellitus without complication, without long-term current use of insulin (HCC) (Primary)  Assessment & Plan:  Diabetes is worsening. A1c has crept up but still okay at 6.1%.  Continue current treatment regimen.  Diabetes will be reassessed in 3 months.    Orders:  -     POC Glycosylated Hemoglobin (Hb A1C)  -     POC Glucose, Blood    2. Essential hypertension  Assessment & Plan:  Hypertension is unchanged.  SBP a bit elevated.  Continue current treatment regimen.  Monitor BP at home.  Blood pressure will be reassessed at the next regular appointment.      3. Hypothyroidism, adult  Assessment & Plan:  Continue T4.  Check TSH today.    Orders:  -     TSH; Future      Return in about 3 months (around 3/28/2022) for Recheck with A1c.    Ramírez Calabrese MD   12/28/2021

## 2021-12-29 LAB — TSH SERPL DL<=0.05 MIU/L-ACNC: 0.9 UIU/ML (ref 0.27–4.2)

## 2022-01-03 RX ORDER — BLOOD SUGAR DIAGNOSTIC
STRIP MISCELLANEOUS
Qty: 100 EACH | Refills: 1 | Status: SHIPPED | OUTPATIENT
Start: 2022-01-03 | End: 2022-06-30

## 2022-01-05 ENCOUNTER — OFFICE VISIT (OUTPATIENT)
Dept: FAMILY MEDICINE CLINIC | Facility: CLINIC | Age: 77
End: 2022-01-05

## 2022-01-05 VITALS
TEMPERATURE: 97.1 F | DIASTOLIC BLOOD PRESSURE: 82 MMHG | SYSTOLIC BLOOD PRESSURE: 140 MMHG | BODY MASS INDEX: 28.16 KG/M2 | OXYGEN SATURATION: 95 % | WEIGHT: 169 LBS | HEIGHT: 65 IN | HEART RATE: 98 BPM | RESPIRATION RATE: 16 BRPM

## 2022-01-05 DIAGNOSIS — R60.0 LOCALIZED EDEMA: Primary | ICD-10-CM

## 2022-01-05 PROCEDURE — 99213 OFFICE O/P EST LOW 20 MIN: CPT | Performed by: FAMILY MEDICINE

## 2022-01-06 NOTE — PROGRESS NOTES
"Chief Complaint  Lower Extremity Issue    Subjective          Leila Pierre presents to [facility]  Leg Swelling  This is a recurrent (worse if on her feet a lot) problem. The current episode started more than 1 month ago. The problem occurs intermittently. The problem has been unchanged. Pertinent negatives include no chills, fever or rash. The symptoms are aggravated by standing. Treatments tried: elevation. The treatment provided mild relief.       Objective   Vital Signs:   /82   Pulse 98   Temp 97.1 °F (36.2 °C)   Resp 16   Ht 165.1 cm (65\")   Wt 76.7 kg (169 lb)   SpO2 95%   BMI 28.12 kg/m²     Physical Exam  Vitals and nursing note reviewed.   Constitutional:       Appearance: Normal appearance.   HENT:      Head: Normocephalic and atraumatic.      Mouth/Throat:      Mouth: Mucous membranes are moist.   Cardiovascular:      Rate and Rhythm: Normal rate and regular rhythm.   Pulmonary:      Effort: Pulmonary effort is normal.   Musculoskeletal:      Cervical back: Neck supple.      Right lower leg: Edema present.      Left lower leg: Edema present.   Neurological:      General: No focal deficit present.      Mental Status: She is alert.        Result Review :                 Assessment and Plan    Diagnoses and all orders for this visit:    1. Localized edema (Primary)    alternate lasix 40 with 20 mg qd, support stockings    Follow Up   Return for Keep Appt..  Patient was given instructions and counseling regarding her condition or for health maintenance advice. Please see specific information pulled into the AVS if appropriate.       "

## 2022-01-08 RX ORDER — AMLODIPINE BESYLATE 5 MG/1
TABLET ORAL
Qty: 180 TABLET | Refills: 1 | Status: SHIPPED | OUTPATIENT
Start: 2022-01-08 | End: 2022-06-28 | Stop reason: SDUPTHER

## 2022-01-17 DIAGNOSIS — I10 HTN (HYPERTENSION), MALIGNANT: ICD-10-CM

## 2022-01-17 DIAGNOSIS — E78.5 DYSLIPIDEMIA: ICD-10-CM

## 2022-01-31 RX ORDER — BACLOFEN 10 MG/1
TABLET ORAL
Qty: 90 TABLET | Refills: 0 | Status: SHIPPED | OUTPATIENT
Start: 2022-01-31 | End: 2022-03-25

## 2022-02-17 ENCOUNTER — TELEPHONE (OUTPATIENT)
Dept: FAMILY MEDICINE CLINIC | Facility: CLINIC | Age: 77
End: 2022-02-17

## 2022-02-17 NOTE — TELEPHONE ENCOUNTER
Pharmacy Name:  [facility]    Pharmacy representative name: [facility]    Pharmacy representative phone number: [phone]      What medication are you calling in regards to: KLONODINE PATCHES    What question does the pharmacy have: MEDICATION HAS GONE UP IN PRICE PHARMACY WAS TRYING TO FAX THE EXCEPTION FORM FOR PATIENT TO RECEIVE MEDICATION AT A DISCOUNTED PRICE BUT THE FAX WILL NOT GO THROUGH AND [facility] DOES HAVE THE CORRECT FAX NUMBER.     PLEASE ADVISE    Who is the provider that prescribed the medication: DR SINGH    Additional notes:

## 2022-02-24 ENCOUNTER — OFFICE VISIT (OUTPATIENT)
Dept: FAMILY MEDICINE CLINIC | Facility: CLINIC | Age: 77
End: 2022-02-24

## 2022-02-24 ENCOUNTER — LAB (OUTPATIENT)
Dept: LAB | Facility: HOSPITAL | Age: 77
End: 2022-02-24

## 2022-02-24 ENCOUNTER — TELEPHONE (OUTPATIENT)
Dept: FAMILY MEDICINE CLINIC | Facility: CLINIC | Age: 77
End: 2022-02-24

## 2022-02-24 VITALS
TEMPERATURE: 98.3 F | OXYGEN SATURATION: 97 % | BODY MASS INDEX: 27.99 KG/M2 | WEIGHT: 168 LBS | HEART RATE: 76 BPM | SYSTOLIC BLOOD PRESSURE: 128 MMHG | RESPIRATION RATE: 16 BRPM | HEIGHT: 65 IN | DIASTOLIC BLOOD PRESSURE: 84 MMHG

## 2022-02-24 DIAGNOSIS — I10 ESSENTIAL HYPERTENSION: ICD-10-CM

## 2022-02-24 DIAGNOSIS — E78.01 FAMILIAL HYPERCHOLESTEROLEMIA: ICD-10-CM

## 2022-02-24 DIAGNOSIS — M25.551 HIP PAIN, CHRONIC, RIGHT: Primary | ICD-10-CM

## 2022-02-24 DIAGNOSIS — G89.29 HIP PAIN, CHRONIC, RIGHT: Primary | ICD-10-CM

## 2022-02-24 PROCEDURE — 99214 OFFICE O/P EST MOD 30 MIN: CPT | Performed by: FAMILY MEDICINE

## 2022-02-24 PROCEDURE — 80061 LIPID PANEL: CPT | Performed by: FAMILY MEDICINE

## 2022-02-24 PROCEDURE — 80053 COMPREHEN METABOLIC PANEL: CPT | Performed by: FAMILY MEDICINE

## 2022-02-24 RX ORDER — CANDESARTAN 16 MG/1
16 TABLET ORAL DAILY
Qty: 90 TABLET | Refills: 1 | Status: SHIPPED | OUTPATIENT
Start: 2022-02-24 | End: 2022-08-01

## 2022-02-24 NOTE — TELEPHONE ENCOUNTER
PHARMACY IS REQUESTING A CALL BACK FOR MEDICATION CLARIFICATION ON  candesartan (ATACAND) 16 MG tablet    CALL BACK NUMBER [phone] REF# [id number]

## 2022-02-25 LAB
ALBUMIN SERPL-MCNC: 5 G/DL (ref 3.5–5.2)
ALBUMIN/GLOB SERPL: 2 G/DL
ALP SERPL-CCNC: 53 U/L (ref 39–117)
ALT SERPL W P-5'-P-CCNC: 23 U/L (ref 1–33)
ANION GAP SERPL CALCULATED.3IONS-SCNC: 14.8 MMOL/L (ref 5–15)
AST SERPL-CCNC: 24 U/L (ref 1–32)
BILIRUB SERPL-MCNC: 0.5 MG/DL (ref 0–1.2)
BUN SERPL-MCNC: 13 MG/DL (ref 8–23)
BUN/CREAT SERPL: 15.5 (ref 7–25)
CALCIUM SPEC-SCNC: 10 MG/DL (ref 8.6–10.5)
CHLORIDE SERPL-SCNC: 103 MMOL/L (ref 98–107)
CHOLEST SERPL-MCNC: 217 MG/DL (ref 0–200)
CO2 SERPL-SCNC: 23.2 MMOL/L (ref 22–29)
CREAT SERPL-MCNC: 0.84 MG/DL (ref 0.57–1)
GFR SERPL CREATININE-BSD FRML MDRD: 66 ML/MIN/1.73
GLOBULIN UR ELPH-MCNC: 2.5 GM/DL
GLUCOSE SERPL-MCNC: 122 MG/DL (ref 65–99)
HDLC SERPL-MCNC: 40 MG/DL (ref 40–60)
LDLC SERPL CALC-MCNC: 147 MG/DL (ref 0–100)
LDLC/HDLC SERPL: 3.59 {RATIO}
POTASSIUM SERPL-SCNC: 4.3 MMOL/L (ref 3.5–5.2)
PROT SERPL-MCNC: 7.5 G/DL (ref 6–8.5)
SODIUM SERPL-SCNC: 141 MMOL/L (ref 136–145)
TRIGL SERPL-MCNC: 167 MG/DL (ref 0–150)
VLDLC SERPL-MCNC: 30 MG/DL (ref 5–40)

## 2022-02-25 NOTE — PROGRESS NOTES
"Chief Complaint  No chief complaint on file.    Subjective          Leila Pierre presents to [facility]  Hyperlipidemia  This is a chronic problem. The problem is controlled. Current antihyperlipidemic treatment includes diet change and exercise. The current treatment provides moderate improvement of lipids. There are no compliance problems.    Hypertension  This is a chronic problem. The current episode started more than 1 year ago. The problem is unchanged. The problem is controlled. Pertinent negatives include no palpitations. Risk factors for coronary artery disease include post-menopausal state, sedentary lifestyle and stress. Current antihypertension treatment includes central alpha agonists, calcium channel blockers, angiotensin blockers, beta blockers and diuretics. The current treatment provides significant improvement. There are no compliance problems.  There is no history of angina, kidney disease, CAD/MI or left ventricular hypertrophy.   Hip Pain   The incident occurred more than 1 week ago. There was no injury mechanism. The pain is present in the right hip. The quality of the pain is described as aching. The pain is moderate. Pertinent negatives include no inability to bear weight, numbness or tingling. The symptoms are aggravated by palpation and weight bearing. She has tried nothing for the symptoms.       Objective   Vital Signs:   /84   Pulse 76   Temp 98.3 °F (36.8 °C)   Resp 16   Ht 165.1 cm (65\")   Wt 76.2 kg (168 lb)   SpO2 97%   BMI 27.96 kg/m²     Physical Exam  Vitals and nursing note reviewed.   Constitutional:       Appearance: Normal appearance. She is well-developed.   HENT:      Head: Normocephalic and atraumatic.      Right Ear: External ear normal.      Left Ear: External ear normal.      Nose: Nose normal.   Eyes:      General: No scleral icterus.     Conjunctiva/sclera: Conjunctivae normal.      Pupils: Pupils are equal, round, " and reactive to light.   Neck:      Thyroid: No thyromegaly.   Cardiovascular:      Rate and Rhythm: Normal rate and regular rhythm.      Heart sounds: Normal heart sounds.   Pulmonary:      Effort: Pulmonary effort is normal.      Breath sounds: Normal breath sounds.   Musculoskeletal:         General: Tenderness present. Normal range of motion.      Cervical back: Neck supple.      Right lower leg: No edema.      Left lower leg: No edema.      Comments: Right hip pain on internal rotation   Skin:     General: Skin is warm and dry.      Findings: No rash.   Neurological:      General: No focal deficit present.      Mental Status: She is alert and oriented to person, place, and time.      Comments: No focal deficits no lateralizing signs   Psychiatric:         Behavior: Behavior is cooperative.        Result Review :                 Assessment and Plan    Diagnoses and all orders for this visit:    1. Hip pain, chronic, right (Primary)  -     XR Hip With or Without Pelvis 2 - 3 View Right; Future    2. Essential hypertension  -     candesartan (ATACAND) 16 MG tablet; Take 1 tablet by mouth Daily.  Dispense: 90 tablet; Refill: 1  -     Comprehensive Metabolic Panel; Future  -     Comprehensive Metabolic Panel    3. Familial hypercholesterolemia  -     Comprehensive Metabolic Panel; Future  -     Lipid Panel; Future  -     Comprehensive Metabolic Panel  -     Lipid Panel        Follow Up   Return for Keep Appt..  Patient was given instructions and counseling regarding her condition or for health maintenance advice. Please see specific information pulled into the AVS if appropriate.

## 2022-03-17 RX ORDER — FUROSEMIDE 20 MG/1
TABLET ORAL
Qty: 180 TABLET | Refills: 0 | Status: SHIPPED | OUTPATIENT
Start: 2022-03-17 | End: 2022-06-14

## 2022-03-21 ENCOUNTER — TELEPHONE (OUTPATIENT)
Dept: FAMILY MEDICINE CLINIC | Facility: CLINIC | Age: 77
End: 2022-03-21

## 2022-03-21 NOTE — TELEPHONE ENCOUNTER
Caller: Leila Pierre    Relationship: Self    Best call back number: [phone]    Which medication are you concerned about: cloNIDine (CATAPRES-TTS) 0.2 MG/24HR patch    Who prescribed you this medication: DR SINGH    What are your concerns: PATIENT STATED SHE WAS INFORMED BY [facility] THEY NEED INFORMATION FROM DR. SINGH TO DO A AC REDUCTION ON THIS MEDICATION. PATIENT STATED SHE WAS TOLD PREVIOUSLY THIS HAD BEEN SENT TO [facility] ON 2/21/22 BUT THEY NEVER RECEIVED THIS INFORMATION.        [facility] - [address] [phone] [facility] [phone]

## 2022-03-25 RX ORDER — BACLOFEN 10 MG/1
TABLET ORAL
Qty: 90 TABLET | Refills: 0 | Status: SHIPPED | OUTPATIENT
Start: 2022-03-25 | End: 2022-05-16

## 2022-03-26 NOTE — TELEPHONE ENCOUNTER
Defer further refills to PCP  Lab Results   Component Value Date    GLUCOSE 123 (H) 01/26/2021    CALCIUM 9.8 01/26/2021     01/26/2021    K 4.3 01/26/2021    CO2 24.7 01/26/2021     01/26/2021    BUN 12 01/26/2021    CREATININE 0.65 01/26/2021    EGFRIFAFRI 77 02/26/2015    EGFRIFNONA 89 01/26/2021    BCR 18.5 01/26/2021    ANIONGAP 12.3 01/26/2021      
26-Mar-2022

## 2022-04-11 DIAGNOSIS — R92.8 ABNORMAL MAMMOGRAM: Primary | ICD-10-CM

## 2022-05-15 RX ORDER — POTASSIUM CHLORIDE 750 MG/1
TABLET, EXTENDED RELEASE ORAL
Qty: 180 TABLET | Refills: 1 | Status: SHIPPED | OUTPATIENT
Start: 2022-05-15 | End: 2022-11-08

## 2022-05-16 RX ORDER — BACLOFEN 10 MG/1
TABLET ORAL
Qty: 90 TABLET | Refills: 0 | Status: SHIPPED | OUTPATIENT
Start: 2022-05-16 | End: 2022-06-28 | Stop reason: SDUPTHER

## 2022-05-25 ENCOUNTER — OFFICE VISIT (OUTPATIENT)
Dept: ENDOCRINOLOGY | Facility: CLINIC | Age: 77
End: 2022-05-25

## 2022-05-25 VITALS
HEART RATE: 66 BPM | BODY MASS INDEX: 28.02 KG/M2 | SYSTOLIC BLOOD PRESSURE: 128 MMHG | DIASTOLIC BLOOD PRESSURE: 64 MMHG | OXYGEN SATURATION: 96 % | HEIGHT: 65 IN | WEIGHT: 168.2 LBS

## 2022-05-25 DIAGNOSIS — I10 ESSENTIAL HYPERTENSION: ICD-10-CM

## 2022-05-25 DIAGNOSIS — E04.2 NONTOXIC MULTINODULAR GOITER: ICD-10-CM

## 2022-05-25 DIAGNOSIS — E11.9 TYPE 2 DIABETES MELLITUS WITHOUT COMPLICATION, WITHOUT LONG-TERM CURRENT USE OF INSULIN: Primary | ICD-10-CM

## 2022-05-25 DIAGNOSIS — E78.01 FAMILIAL HYPERCHOLESTEROLEMIA: ICD-10-CM

## 2022-05-25 DIAGNOSIS — E03.9 HYPOTHYROIDISM, ADULT: ICD-10-CM

## 2022-05-25 LAB
EXPIRATION DATE: NORMAL
EXPIRATION DATE: NORMAL
GLUCOSE BLDC GLUCOMTR-MCNC: 111 MG/DL (ref 70–130)
HBA1C MFR BLD: 6.3 %
Lab: NORMAL
Lab: NORMAL

## 2022-05-25 PROCEDURE — 82947 ASSAY GLUCOSE BLOOD QUANT: CPT | Performed by: INTERNAL MEDICINE

## 2022-05-25 PROCEDURE — 83036 HEMOGLOBIN GLYCOSYLATED A1C: CPT | Performed by: INTERNAL MEDICINE

## 2022-05-25 PROCEDURE — 3044F HG A1C LEVEL LT 7.0%: CPT | Performed by: INTERNAL MEDICINE

## 2022-05-25 PROCEDURE — 99214 OFFICE O/P EST MOD 30 MIN: CPT | Performed by: INTERNAL MEDICINE

## 2022-05-25 NOTE — ASSESSMENT & PLAN NOTE
Statin intolerant.  Her PCP checked lipids about 3 months ago.   Quinolones Counseling:  I discussed with the patient the risks of fluoroquinolones including but not limited to GI upset, allergic reaction, drug rash, diarrhea, dizziness, photosensitivity, yeast infections, liver function test abnormalities, tendonitis/tendon rupture.

## 2022-05-25 NOTE — PROGRESS NOTES
"     Office Note      Date: 2022  Patient Name: Leila Pierre  MRN: [id number]  : 1945    Chief Complaint   Patient presents with   • Diabetes       History of Present Illness:   Leila Pierre is a 77 y.o. female who presents for Diabetes type 2. Diagnosed in: . Treated in past with oral agents. Current treatments: metformin. Number of insulin shots per day: none. Checks blood sugar 1 times a day. Has low blood sugar: no. Aspirin use: Yes. Statin use: No - intolerant. ACE-I/ARB use: Yes. Changes in health since last visit: none. Last eye exam 2021.     She remains on T4 125mcg qd. She is taking this correctly. She isn't taking any interfering meds concurrently. She denies any sxs of hypo- or hyperthyroidism at this time. She hasn't noted any change in the size of her neck. She denies any compressive sxs.    Subjective      Diabetic Complications:  Eyes: No  Kidneys: No  Feet: No  Heart: No    Diet and Exercise:  Meals per day: 3  Minutes of exercise per week: 0 mins.    Review of Systems:   Review of Systems   Constitutional: Negative.    Cardiovascular: Negative.    Gastrointestinal: Negative.    Endocrine: Negative.        The following portions of the patient's history were reviewed and updated as appropriate: allergies, current medications, past family history, past medical history, past social history, past surgical history and problem list.    Objective       Visit Vitals  /64   Pulse 66   Ht 165.1 cm (65\")   Wt 76.3 kg (168 lb 3.2 oz)   LMP  (LMP Unknown)   SpO2 96%   BMI 27.99 kg/m²       Physical Exam:  Physical Exam  Constitutional:       Appearance: Normal appearance.   Neck:      Thyroid: No thyroid mass, thyromegaly or thyroid tenderness.   Lymphadenopathy:      Cervical: No cervical adenopathy.   Neurological:      Mental Status: She is alert.         Labs:    HbA1c  Lab Results   Component Value Date    HGBA1C 6.3 2022       CMP  Lab Results   Component Value " Date    GLUCOSE 122 (H) 02/24/2022    BUN 13 02/24/2022    CREATININE 0.84 02/24/2022    EGFRIFNONA 66 02/24/2022    EGFRIFAFRI 77 02/26/2015    BCR 15.5 02/24/2022    K 4.3 02/24/2022    CO2 23.2 02/24/2022    CALCIUM 10.0 02/24/2022    PROTENTOTREF 6.9 02/26/2015    LABIL2 2.5 02/26/2015    AST 24 02/24/2022    ALT 23 02/24/2022        Lipid Panel  Lab Results   Component Value Date    CHLPL 223 (H) 09/01/2015    HDL 40 02/24/2022     (H) 02/24/2022    TRIG 167 (H) 02/24/2022        TSH  Lab Results   Component Value Date    TSH 0.904 12/28/2021        Hemoglobin A1C  Lab Results   Component Value Date    HGBA1C 6.3 05/25/2022        Microalbumin/Creatinine  No results found for: MALBCRERATIO, CREATINIURIN, MICROALBUR        Assessment / Plan      Assessment & Plan:  Diagnoses and all orders for this visit:    1. Type 2 diabetes mellitus without complication, without long-term current use of insulin (HCC) (Primary)  Assessment & Plan:  Diabetes is unchanged.   Continue current treatment regimen.  Diabetes will be reassessed in 3 months.    Orders:  -     POC Glucose, Blood  -     POC Glycosylated Hemoglobin (Hb A1C)    2. Essential hypertension  Assessment & Plan:  Hypertension is unchanged.  Continue current treatment regimen.  Blood pressure will be reassessed at the next regular appointment.      3. Familial hypercholesterolemia  Assessment & Plan:  Statin intolerant.  Her PCP checked lipids about 3 months ago.      4. Hypothyroidism, adult  Assessment & Plan:  TSH okay last visit.  Continue current T4 dose.      5. Nontoxic multinodular goiter  Assessment & Plan:  Plan for another u/s in about 18 months.        Return in about 3 months (around 8/25/2022) for Recheck with A1c, TSH.    Ramírez Calabrese MD   05/25/2022

## 2022-06-14 RX ORDER — FUROSEMIDE 20 MG/1
TABLET ORAL
Qty: 180 TABLET | Refills: 0 | Status: SHIPPED | OUTPATIENT
Start: 2022-06-14 | End: 2022-09-10

## 2022-06-21 RX ORDER — CARVEDILOL 25 MG/1
TABLET ORAL
Qty: 180 TABLET | Refills: 0 | Status: SHIPPED | OUTPATIENT
Start: 2022-06-21 | End: 2022-09-15

## 2022-06-28 ENCOUNTER — OFFICE VISIT (OUTPATIENT)
Dept: FAMILY MEDICINE CLINIC | Facility: CLINIC | Age: 77
End: 2022-06-28

## 2022-06-28 ENCOUNTER — LAB (OUTPATIENT)
Dept: LAB | Facility: HOSPITAL | Age: 77
End: 2022-06-28

## 2022-06-28 VITALS
HEART RATE: 66 BPM | BODY MASS INDEX: 28.49 KG/M2 | OXYGEN SATURATION: 96 % | TEMPERATURE: 97.1 F | SYSTOLIC BLOOD PRESSURE: 122 MMHG | HEIGHT: 65 IN | DIASTOLIC BLOOD PRESSURE: 72 MMHG | WEIGHT: 171 LBS

## 2022-06-28 DIAGNOSIS — E55.9 VITAMIN D DEFICIENCY: ICD-10-CM

## 2022-06-28 DIAGNOSIS — K21.9 GASTROESOPHAGEAL REFLUX DISEASE, UNSPECIFIED WHETHER ESOPHAGITIS PRESENT: ICD-10-CM

## 2022-06-28 DIAGNOSIS — Z00.00 MEDICARE ANNUAL WELLNESS VISIT, SUBSEQUENT: ICD-10-CM

## 2022-06-28 DIAGNOSIS — E11.9 TYPE 2 DIABETES MELLITUS WITHOUT COMPLICATION, WITHOUT LONG-TERM CURRENT USE OF INSULIN: ICD-10-CM

## 2022-06-28 DIAGNOSIS — E78.01 FAMILIAL HYPERCHOLESTEROLEMIA: ICD-10-CM

## 2022-06-28 DIAGNOSIS — E03.9 HYPOTHYROIDISM, ADULT: ICD-10-CM

## 2022-06-28 DIAGNOSIS — I10 ESSENTIAL HYPERTENSION: Primary | ICD-10-CM

## 2022-06-28 DIAGNOSIS — I10 ESSENTIAL HYPERTENSION: ICD-10-CM

## 2022-06-28 LAB
25(OH)D3 SERPL-MCNC: 48.8 NG/ML (ref 30–100)
ALBUMIN SERPL-MCNC: 5.1 G/DL (ref 3.5–5.2)
ALBUMIN UR-MCNC: <1.2 MG/DL
ALBUMIN/GLOB SERPL: 2 G/DL
ALP SERPL-CCNC: 63 U/L (ref 39–117)
ALT SERPL W P-5'-P-CCNC: 24 U/L (ref 1–33)
ANION GAP SERPL CALCULATED.3IONS-SCNC: 15 MMOL/L (ref 5–15)
AST SERPL-CCNC: 23 U/L (ref 1–32)
BILIRUB SERPL-MCNC: 0.5 MG/DL (ref 0–1.2)
BILIRUB UR QL STRIP: NEGATIVE
BUN SERPL-MCNC: 10 MG/DL (ref 8–23)
BUN/CREAT SERPL: 11.4 (ref 7–25)
CALCIUM SPEC-SCNC: 9.8 MG/DL (ref 8.6–10.5)
CHLORIDE SERPL-SCNC: 101 MMOL/L (ref 98–107)
CHOLEST SERPL-MCNC: 233 MG/DL (ref 0–200)
CLARITY UR: CLEAR
CO2 SERPL-SCNC: 24 MMOL/L (ref 22–29)
COLOR UR: YELLOW
CREAT SERPL-MCNC: 0.88 MG/DL (ref 0.57–1)
CREAT UR-MCNC: 35.9 MG/DL
DEPRECATED RDW RBC AUTO: 42.1 FL (ref 37–54)
EGFRCR SERPLBLD CKD-EPI 2021: 68.6 ML/MIN/1.73
ERYTHROCYTE [DISTWIDTH] IN BLOOD BY AUTOMATED COUNT: 12.8 % (ref 12.3–15.4)
GLOBULIN UR ELPH-MCNC: 2.5 GM/DL
GLUCOSE SERPL-MCNC: 123 MG/DL (ref 65–99)
GLUCOSE UR STRIP-MCNC: NEGATIVE MG/DL
HCT VFR BLD AUTO: 43 % (ref 34–46.6)
HDLC SERPL-MCNC: 44 MG/DL (ref 40–60)
HGB BLD-MCNC: 14.7 G/DL (ref 12–15.9)
HGB UR QL STRIP.AUTO: NEGATIVE
KETONES UR QL STRIP: NEGATIVE
LDLC SERPL CALC-MCNC: 155 MG/DL (ref 0–100)
LDLC/HDLC SERPL: 3.45 {RATIO}
LEUKOCYTE ESTERASE UR QL STRIP.AUTO: NEGATIVE
MCH RBC QN AUTO: 31.1 PG (ref 26.6–33)
MCHC RBC AUTO-ENTMCNC: 34.2 G/DL (ref 31.5–35.7)
MCV RBC AUTO: 91.1 FL (ref 79–97)
MICROALBUMIN/CREAT UR: NORMAL MG/G{CREAT}
NITRITE UR QL STRIP: NEGATIVE
PH UR STRIP.AUTO: 6 [PH] (ref 5–8)
PLATELET # BLD AUTO: 220 10*3/MM3 (ref 140–450)
PMV BLD AUTO: 13 FL (ref 6–12)
POTASSIUM SERPL-SCNC: 4.3 MMOL/L (ref 3.5–5.2)
PROT SERPL-MCNC: 7.6 G/DL (ref 6–8.5)
PROT UR QL STRIP: NEGATIVE
RBC # BLD AUTO: 4.72 10*6/MM3 (ref 3.77–5.28)
SODIUM SERPL-SCNC: 140 MMOL/L (ref 136–145)
SP GR UR STRIP: 1.01 (ref 1–1.03)
TRIGL SERPL-MCNC: 186 MG/DL (ref 0–150)
TSH SERPL DL<=0.05 MIU/L-ACNC: 2.26 UIU/ML (ref 0.27–4.2)
UROBILINOGEN UR QL STRIP: NORMAL
VLDLC SERPL-MCNC: 34 MG/DL (ref 5–40)
WBC NRBC COR # BLD: 7.4 10*3/MM3 (ref 3.4–10.8)

## 2022-06-28 PROCEDURE — 93000 ELECTROCARDIOGRAM COMPLETE: CPT | Performed by: FAMILY MEDICINE

## 2022-06-28 PROCEDURE — 84443 ASSAY THYROID STIM HORMONE: CPT | Performed by: FAMILY MEDICINE

## 2022-06-28 PROCEDURE — 82043 UR ALBUMIN QUANTITATIVE: CPT | Performed by: FAMILY MEDICINE

## 2022-06-28 PROCEDURE — 82570 ASSAY OF URINE CREATININE: CPT | Performed by: FAMILY MEDICINE

## 2022-06-28 PROCEDURE — 82306 VITAMIN D 25 HYDROXY: CPT | Performed by: FAMILY MEDICINE

## 2022-06-28 PROCEDURE — 1170F FXNL STATUS ASSESSED: CPT | Performed by: FAMILY MEDICINE

## 2022-06-28 PROCEDURE — 85027 COMPLETE CBC AUTOMATED: CPT | Performed by: FAMILY MEDICINE

## 2022-06-28 PROCEDURE — 81003 URINALYSIS AUTO W/O SCOPE: CPT | Performed by: FAMILY MEDICINE

## 2022-06-28 PROCEDURE — G0439 PPPS, SUBSEQ VISIT: HCPCS | Performed by: FAMILY MEDICINE

## 2022-06-28 PROCEDURE — 1159F MED LIST DOCD IN RCRD: CPT | Performed by: FAMILY MEDICINE

## 2022-06-28 PROCEDURE — 80053 COMPREHEN METABOLIC PANEL: CPT | Performed by: FAMILY MEDICINE

## 2022-06-28 PROCEDURE — 80061 LIPID PANEL: CPT | Performed by: FAMILY MEDICINE

## 2022-06-28 RX ORDER — AMLODIPINE BESYLATE 5 MG/1
5 TABLET ORAL 2 TIMES DAILY
Qty: 180 TABLET | Refills: 1 | Status: SHIPPED | OUTPATIENT
Start: 2022-06-28 | End: 2022-12-13

## 2022-06-28 RX ORDER — BACLOFEN 10 MG/1
10 TABLET ORAL 3 TIMES DAILY
Qty: 90 TABLET | Refills: 2 | Status: SHIPPED | OUTPATIENT
Start: 2022-06-28 | End: 2022-11-13

## 2022-06-28 NOTE — PROGRESS NOTES
The ABCs of the Annual Wellness Visit  Subsequent Medicare Wellness Visit    Chief Complaint   Patient presents with   • Medicare Wellness-subsequent      Subjective    History of Present Illness:  Leila Pierre is a 77 y.o. female who presents for a Subsequent Medicare Wellness Visit.      The following portions of the patient's history were reviewed and   updated as appropriate: allergies, current medications, past family history, past medical history, past social history, past surgical history and problem list.     Compared to one year ago, the patient feels her physical   health is the same.    Compared to one year ago, the patient feels her mental   health is the same.    Recent Hospitalizations:  She was not admitted to the hospital during the last year.       Current Medical Providers:  Patient Care Team:  Ike Cifuentes MD as PCP - General  Ike Cifuentes MD as PCP - Family Medicine  Ramírez Calabrese MD as Consulting Physician (Endocrinology)  Janine Long MD as Consulting Physician (Dermatology)  Benjamin Andres APRN as Nurse Practitioner (Nurse Practitioner)    Outpatient Medications Prior to Visit   Medication Sig Dispense Refill   • Accu-Chek Guide test strip USE AS INSTRUCTED DAILY 100 each 1   • aspirin 81 MG EC tablet Take 81 mg by mouth Daily.     • BIOTIN 5000 PO Take 1 tablet by mouth Daily.     • Calcium Carbonate (CALCIUM 600 PO) Take 1 tablet by mouth Daily.     • candesartan (ATACAND) 16 MG tablet Take 1 tablet by mouth Daily. 90 tablet 1   • carvedilol (COREG) 25 MG tablet TAKE ONE TABLET BY MOUTH TWICE A DAY WITH MEALS 180 tablet 0   • cholecalciferol (VITAMIN D3) 1000 UNITS tablet Take 1,000 Units by mouth Daily.     • cloNIDine (CATAPRES-TTS) 0.2 MG/24HR patch APPLY 1 PATCH ONTO THE SKIN AS DIRECTED BY PROVIDER ONE TIME PER WEEK 12 patch 3   • famotidine (PEPCID) 20 MG tablet Take 20 mg by mouth 2 (Two) Times a Day.     • fexofenadine (ALLEGRA) 180 MG tablet Take  180 mg by mouth Daily.     • furosemide (LASIX) 20 MG tablet TAKE ONE TABLET BY MOUTH TWICE A  tablet 0   • Lancets (accu-chek multiclix) lancets  each 3   • levothyroxine (SYNTHROID, LEVOTHROID) 125 MCG tablet Take 1 tablet by mouth Daily. 90 tablet 3   • metFORMIN (GLUCOPHAGE) 500 MG tablet Take 2 tablets by mouth 2 (Two) Times a Day With Meals. 360 tablet 3   • potassium chloride (K-DUR,KLOR-CON) 10 MEQ CR tablet TAKE ONE TABLET BY MOUTH TWICE A  tablet 1   • amLODIPine (NORVASC) 5 MG tablet TAKE ONE TABLET BY MOUTH TWICE A  tablet 1   • baclofen (LIORESAL) 10 MG tablet TAKE ONE TABLET BY MOUTH THREE TIMES A DAY 90 tablet 0     No facility-administered medications prior to visit.       No opioid medication identified on active medication list. I have reviewed chart for other potential  high risk medication/s and harmful drug interactions in the elderly.          Aspirin is on active medication list. Aspirin use is indicated based on review of current medical condition/s. Pros and cons of this therapy have been discussed today. Benefits of this medication outweigh potential harm.  Patient has been encouraged to continue taking this medication.  .      Patient Active Problem List   Diagnosis   • Elevated alanine aminotransferase (ALT) level   • Hyperlipidemia   • Vitamin D deficiency   • Type 2 diabetes mellitus without complication, without long-term current use of insulin (HCC)   • Generalized osteoarthritis   • Eczema   • Essential hypertension   • Muscle spasm   • Mild obstructive sleep apnea   • Snoring   • Nontoxic multinodular goiter   • Hypothyroidism, adult     Advance Care Planning   Advance Directive is not on file.  ACP discussion was held with the patient during this visit. Patient has an advance directive (not in EMR), copy requested.    Review of Systems   Constitutional: Negative for activity change and unexpected weight change.   HENT: Negative for congestion and sore  "throat.    Eyes: Negative for pain and visual disturbance.        Eye exam in December 2021.   Respiratory: Negative for cough and shortness of breath.    Cardiovascular: Negative for chest pain, palpitations and leg swelling.        Some mild swelling of the feet and legs   Gastrointestinal: Negative for diarrhea, nausea and vomiting.        2 episodes of discomfort in the epigastric area   Endocrine: Negative for cold intolerance and heat intolerance.   Genitourinary: Negative for dysuria and hematuria.   Musculoskeletal: Negative for arthralgias and joint swelling.   Skin: Negative for color change and rash.   Allergic/Immunologic: Negative for environmental allergies and food allergies.   Neurological: Negative for syncope and headaches.   Hematological: Negative for adenopathy. Does not bruise/bleed easily.   Psychiatric/Behavioral: Negative for dysphoric mood and sleep disturbance. The patient is not nervous/anxious.          Objective       Vitals:    06/28/22 0917   BP: 122/72   Pulse: 66   Temp: 97.1 °F (36.2 °C)   SpO2: 96%   Weight: 77.6 kg (171 lb)   Height: 165.1 cm (65\")   PainSc: 0-No pain     BMI Readings from Last 1 Encounters:   06/28/22 28.46 kg/m²     BMI is above normal parameters. Recommendations include: educational material    Does the patient have evidence of cognitive impairment? No    Physical Exam  Vitals and nursing note reviewed.   Constitutional:       Appearance: Normal appearance. She is well-developed.   HENT:      Head: Normocephalic and atraumatic.      Right Ear: Tympanic membrane normal.      Left Ear: Tympanic membrane normal.      Nose: Nose normal.   Eyes:      General: No scleral icterus.     Conjunctiva/sclera: Conjunctivae normal.      Pupils: Pupils are equal, round, and reactive to light.   Neck:      Thyroid: No thyromegaly.      Vascular: No carotid bruit.      Comments: No thyromegaly no JVD  Cardiovascular:      Rate and Rhythm: Normal rate and regular rhythm.      " Heart sounds: Normal heart sounds.   Pulmonary:      Effort: Pulmonary effort is normal.      Breath sounds: Normal breath sounds.   Abdominal:      General: Bowel sounds are normal.      Palpations: There is no mass.      Tenderness: There is no abdominal tenderness.   Musculoskeletal:         General: Normal range of motion.      Cervical back: Neck supple.      Comments: Trace edema at the ankles   Lymphadenopathy:      Cervical: No cervical adenopathy.   Skin:     General: Skin is warm and dry.      Findings: No rash.   Neurological:      General: No focal deficit present.      Mental Status: She is alert and oriented to person, place, and time.      Comments: No focal deficits no lateralizing signs   Psychiatric:         Mood and Affect: Mood normal.         Behavior: Behavior is cooperative.       Lab Results   Component Value Date    HGBA1C 6.3 05/25/2022            HEALTH RISK ASSESSMENT    Smoking Status:  Social History     Tobacco Use   Smoking Status Never Smoker   Smokeless Tobacco Never Used     Alcohol Consumption:  Social History     Substance and Sexual Activity   Alcohol Use Yes   • Alcohol/week: 1.0 - 2.0 standard drink   • Types: 1 - 2 Glasses of wine per week    Comment: occasional wine     Fall Risk Screen:    [facility] Fall Risk Assessment was completed, and patient is at LOW risk for falls.Assessment completed on:6/28/2022    Depression Screening:  PHQ-2/PHQ-9 Depression Screening 6/28/2022   Retired PHQ-9 Total Score -   Retired Total Score -   Little Interest or Pleasure in Doing Things 0-->not at all   Feeling Down, Depressed or Hopeless 0-->not at all   PHQ-9: Brief Depression Severity Measure Score 0       Health Habits and Functional and Cognitive Screening:  Functional & Cognitive Status 6/28/2022   Do you have difficulty preparing food and eating? No   Do you have difficulty bathing yourself, getting dressed or grooming yourself? No   Do you have difficulty using the toilet? No   Do you  have difficulty moving around from place to place? No   Do you have trouble with steps or getting out of a bed or a chair? No   Current Diet Well Balanced Diet   Dental Exam Up to date   Eye Exam Up to date   Exercise (times per week) Other   Current Exercises Include Other   Do you need help using the phone?  No   Are you deaf or do you have serious difficulty hearing?  No   Do you need help with transportation? No   Do you need help shopping? No   Do you need help preparing meals?  No   Do you need help with housework?  No   Do you need help with laundry? No   Do you need help taking your medications? No   Do you need help managing money? No   Do you ever drive or ride in a car without wearing a seat belt? No   Have you felt unusual stress, anger or loneliness in the last month? No   Who do you live with? Spouse   If you need help, do you have trouble finding someone available to you? No   Have you been bothered in the last four weeks by sexual problems? No   Do you have difficulty concentrating, remembering or making decisions? No       Age-appropriate Screening Schedule:  Refer to the list below for future screening recommendations based on patient's age, sex and/or medical conditions. Orders for these recommended tests are listed in the plan section. The patient has been provided with a written plan.    Health Maintenance   Topic Date Due   • DIABETIC FOOT EXAM  06/16/2021   • URINE MICROALBUMIN  06/22/2022   • INFLUENZA VACCINE  10/01/2022   • HEMOGLOBIN A1C  11/25/2022   • DIABETIC EYE EXAM  12/21/2022   • LIPID PANEL  02/24/2023   • DXA SCAN  09/17/2023   • MAMMOGRAM  12/07/2023   • TDAP/TD VACCINES (4 - Td or Tdap) 06/22/2031   • ZOSTER VACCINE  Completed              Assessment & Plan     CMS Preventative Services Quick Reference  Risk Factors Identified During Encounter  Cardiovascular Disease  Immunizations Discussed/Encouraged (specific Immunizations; Prevnar 20 (Pneumococcal 20-valent conjugate) and  COVID19  Inactivity/Sedentary  The above risks/problems have been discussed with the patient.  Follow up actions/plans if indicated are seen below in the Assessment/Plan Section.  Pertinent information has been shared with the patient in the After Visit Summary.    Diagnoses and all orders for this visit:    1. Essential hypertension (Primary)  -     Comprehensive Metabolic Panel; Future  -     CBC (No Diff); Future  -     ECG 12 Lead  -     amLODIPine (NORVASC) 5 MG tablet; Take 1 tablet by mouth 2 (Two) Times a Day.  Dispense: 180 tablet; Refill: 1    2. Familial hypercholesterolemia  -     Lipid Panel; Future    3. Hypothyroidism, adult  -     TSH; Future    4. Medicare annual wellness visit, subsequent  -     Comprehensive Metabolic Panel; Future  -     Lipid Panel; Future  -     TSH; Future  -     Vitamin D 25 Hydroxy; Future  -     CBC (No Diff); Future  -     Urinalysis With Culture If Indicated -; Future  -     ECG 12 Lead  -     Microalbumin / Creatinine Urine Ratio - Urine, Clean Catch; Future    5. Vitamin D deficiency  -     Vitamin D 25 Hydroxy; Future    6. Type 2 diabetes mellitus without complication, without long-term current use of insulin (HCC)  -     Comprehensive Metabolic Panel; Future  -     Microalbumin / Creatinine Urine Ratio - Urine, Clean Catch; Future    7. Gastroesophageal reflux disease, unspecified whether esophagitis present    Other orders  -     baclofen (LIORESAL) 10 MG tablet; Take 1 tablet by mouth 3 (Three) Times a Day.  Dispense: 90 tablet; Refill: 2    see form    ECG 12 Lead    Date/Time: 6/28/2022 5:00 PM  Performed by: Ike Cifuentes MD  Authorized by: Ike Cifuentes MD   Comparison: compared with previous ECG   Similar to previous ECG  Rhythm: sinus rhythm  Rate: bradycardic  BPM: 57  QRS axis: normal  Other findings: non-specific ST-T wave changes    Clinical impression: non-specific ECG            Follow Up:   Return in about 4 months (around 10/28/2022) for  Recheck.     An After Visit Summary and PPPS were given to the patient.

## 2022-07-11 RX ORDER — LEVOTHYROXINE SODIUM 0.12 MG/1
TABLET ORAL
Qty: 90 TABLET | Refills: 0 | Status: SHIPPED | OUTPATIENT
Start: 2022-07-11 | End: 2022-07-28

## 2022-07-28 RX ORDER — LEVOTHYROXINE SODIUM 0.12 MG/1
TABLET ORAL
Qty: 90 TABLET | Refills: 0 | Status: SHIPPED | OUTPATIENT
Start: 2022-07-28 | End: 2022-10-07 | Stop reason: SDUPTHER

## 2022-08-01 DIAGNOSIS — I10 ESSENTIAL HYPERTENSION: ICD-10-CM

## 2022-08-01 RX ORDER — CANDESARTAN 16 MG/1
TABLET ORAL
Qty: 90 TABLET | Refills: 3 | Status: SHIPPED | OUTPATIENT
Start: 2022-08-01

## 2022-09-10 RX ORDER — FUROSEMIDE 20 MG/1
TABLET ORAL
Qty: 180 TABLET | Refills: 0 | Status: SHIPPED | OUTPATIENT
Start: 2022-09-10 | End: 2022-12-07

## 2022-09-15 RX ORDER — CARVEDILOL 25 MG/1
TABLET ORAL
Qty: 180 TABLET | Refills: 0 | Status: SHIPPED | OUTPATIENT
Start: 2022-09-15 | End: 2022-12-12

## 2022-10-07 RX ORDER — LEVOTHYROXINE SODIUM 0.12 MG/1
125 TABLET ORAL DAILY
Qty: 90 TABLET | Refills: 0 | Status: SHIPPED | OUTPATIENT
Start: 2022-10-07 | End: 2022-12-06 | Stop reason: SDUPTHER

## 2022-10-17 ENCOUNTER — TRANSCRIBE ORDERS (OUTPATIENT)
Dept: ADMINISTRATIVE | Facility: HOSPITAL | Age: 77
End: 2022-10-17

## 2022-10-17 ENCOUNTER — OFFICE VISIT (OUTPATIENT)
Dept: ENDOCRINOLOGY | Facility: CLINIC | Age: 77
End: 2022-10-17

## 2022-10-17 ENCOUNTER — LAB (OUTPATIENT)
Dept: LAB | Facility: HOSPITAL | Age: 77
End: 2022-10-17

## 2022-10-17 VITALS
HEIGHT: 65 IN | BODY MASS INDEX: 27.66 KG/M2 | SYSTOLIC BLOOD PRESSURE: 130 MMHG | DIASTOLIC BLOOD PRESSURE: 70 MMHG | OXYGEN SATURATION: 96 % | WEIGHT: 166 LBS | HEART RATE: 60 BPM

## 2022-10-17 DIAGNOSIS — E03.9 HYPOTHYROIDISM, ADULT: ICD-10-CM

## 2022-10-17 DIAGNOSIS — Z12.31 VISIT FOR SCREENING MAMMOGRAM: Primary | ICD-10-CM

## 2022-10-17 DIAGNOSIS — E11.9 TYPE 2 DIABETES MELLITUS WITHOUT COMPLICATION, WITHOUT LONG-TERM CURRENT USE OF INSULIN: Primary | ICD-10-CM

## 2022-10-17 DIAGNOSIS — E04.2 NONTOXIC MULTINODULAR GOITER: ICD-10-CM

## 2022-10-17 DIAGNOSIS — I10 ESSENTIAL HYPERTENSION: ICD-10-CM

## 2022-10-17 LAB
EXPIRATION DATE: NORMAL
EXPIRATION DATE: NORMAL
GLUCOSE BLDC GLUCOMTR-MCNC: 97 MG/DL (ref 70–130)
HBA1C MFR BLD: 6.2 %
Lab: NORMAL
Lab: NORMAL

## 2022-10-17 PROCEDURE — 82947 ASSAY GLUCOSE BLOOD QUANT: CPT | Performed by: INTERNAL MEDICINE

## 2022-10-17 PROCEDURE — 3044F HG A1C LEVEL LT 7.0%: CPT | Performed by: INTERNAL MEDICINE

## 2022-10-17 PROCEDURE — 36415 COLL VENOUS BLD VENIPUNCTURE: CPT

## 2022-10-17 PROCEDURE — 83036 HEMOGLOBIN GLYCOSYLATED A1C: CPT | Performed by: INTERNAL MEDICINE

## 2022-10-17 PROCEDURE — 84443 ASSAY THYROID STIM HORMONE: CPT

## 2022-10-17 PROCEDURE — 99214 OFFICE O/P EST MOD 30 MIN: CPT | Performed by: INTERNAL MEDICINE

## 2022-10-17 NOTE — PROGRESS NOTES
"     Office Note      Date: 10/17/2022  Patient Name: Leila Pierre  MRN: [id number]  : 1945    Chief Complaint   Patient presents with   • Diabetes     Type II       History of Present Illness:   Leila Pierre is a 77 y.o. female who presents for Diabetes type 2. Diagnosed in: . Treated in past with oral agents. Current treatments: metformin. Number of insulin shots per day: none. Checks blood sugar 1 time a day. Has low blood sugar: no. Aspirin use: Yes. Statin use: No - intolerant. ACE-I/ARB use: Yes. Changes in health since last visit: none. Last eye exam 2021.     She remains on T4 125mcg qd. She is taking this correctly. She isn't taking any interfering meds concurrently. She denies any sxs of hypo- or hyperthyroidism at this time. She hasn't noted any change in the size of her neck. She denies any compressive sxs.    Subjective      Diabetic Complications:  Eyes: No  Kidneys: No  Feet: No  Heart: No    Diet and Exercise:  Meals per day: 3  Minutes of exercise per week: 0 mins.    Review of Systems:   Review of Systems   Constitutional: Negative.    Cardiovascular: Negative.    Gastrointestinal: Negative.    Endocrine: Negative.        The following portions of the patient's history were reviewed and updated as appropriate: allergies, current medications, past family history, past medical history, past social history, past surgical history and problem list.    Objective       Visit Vitals  /70 (BP Location: Left arm, Patient Position: Sitting, Cuff Size: Adult)   Pulse 60   Ht 165.1 cm (65\")   Wt 75.3 kg (166 lb)   LMP  (LMP Unknown)   SpO2 96%   BMI 27.62 kg/m²       Physical Exam:  Physical Exam  Constitutional:       Appearance: Normal appearance.   Neurological:      Mental Status: She is alert.         Labs:    HbA1c  Lab Results   Component Value Date    HGBA1C 6.2 10/17/2022       CMP  Lab Results   Component Value Date    GLUCOSE 123 (H) 2022    BUN 10 2022    " CREATININE 0.88 06/28/2022    EGFRIFNONA 66 02/24/2022    EGFRIFAFRI 77 02/26/2015    BCR 11.4 06/28/2022    K 4.3 06/28/2022    CO2 24.0 06/28/2022    CALCIUM 9.8 06/28/2022    PROTENTOTREF 6.9 02/26/2015    LABIL2 2.5 02/26/2015    AST 23 06/28/2022    ALT 24 06/28/2022        Lipid Panel  Lab Results   Component Value Date    CHLPL 223 (H) 09/01/2015    HDL 44 06/28/2022     (H) 06/28/2022    TRIG 186 (H) 06/28/2022        TSH  Lab Results   Component Value Date    TSH 2.260 06/28/2022        Hemoglobin A1C  Lab Results   Component Value Date    HGBA1C 6.2 10/17/2022        Microalbumin/Creatinine  Lab Results   Component Value Date    MALBCRERATIO  06/28/2022      Comment:      Unable to calculate    MICROALBUR <1.2 06/28/2022           Assessment / Plan      Assessment & Plan:  Diagnoses and all orders for this visit:    1. Type 2 diabetes mellitus without complication, without long-term current use of insulin (HCC) (Primary)  Assessment & Plan:  Diabetes is unchanged.   Continue current treatment regimen.  Diabetes will be reassessed in 3 months.    Orders:  -     POC Glucose, Blood  -     POC Glycosylated Hemoglobin (Hb A1C)    2. Hypothyroidism, adult  Assessment & Plan:  Continue T4 tx.  Check TSH today.     Orders:  -     TSH; Future    3. Essential hypertension  Assessment & Plan:  Hypertension is unchanged.  Continue current treatment regimen.  Blood pressure will be reassessed at the next regular appointment.      4. Nontoxic multinodular goiter  Assessment & Plan:  Plan for another u/s in about a year.        Return in about 3 months (around 1/17/2023) for Recheck with A1c.    Ramírez Calabrese MD   10/17/2022

## 2022-10-18 LAB — TSH SERPL DL<=0.05 MIU/L-ACNC: 1.21 UIU/ML (ref 0.27–4.2)

## 2022-10-20 DIAGNOSIS — E11.9 TYPE 2 DIABETES MELLITUS WITHOUT COMPLICATION, WITHOUT LONG-TERM CURRENT USE OF INSULIN: ICD-10-CM

## 2022-10-31 ENCOUNTER — OFFICE VISIT (OUTPATIENT)
Dept: FAMILY MEDICINE CLINIC | Facility: CLINIC | Age: 77
End: 2022-10-31

## 2022-10-31 VITALS
WEIGHT: 168 LBS | DIASTOLIC BLOOD PRESSURE: 70 MMHG | SYSTOLIC BLOOD PRESSURE: 120 MMHG | BODY MASS INDEX: 27.99 KG/M2 | TEMPERATURE: 98.3 F | HEIGHT: 65 IN

## 2022-10-31 DIAGNOSIS — Z23 NEED FOR VACCINATION: ICD-10-CM

## 2022-10-31 DIAGNOSIS — I10 ESSENTIAL HYPERTENSION: Primary | ICD-10-CM

## 2022-10-31 DIAGNOSIS — M16.11 PRIMARY OSTEOARTHRITIS OF RIGHT HIP: ICD-10-CM

## 2022-10-31 PROCEDURE — 99214 OFFICE O/P EST MOD 30 MIN: CPT | Performed by: FAMILY MEDICINE

## 2022-10-31 RX ORDER — MAGNESIUM OXIDE 400 MG/1
400 TABLET ORAL DAILY
Qty: 90 TABLET | Refills: 0 | Status: SHIPPED | OUTPATIENT
Start: 2022-10-31

## 2022-11-08 RX ORDER — POTASSIUM CHLORIDE 750 MG/1
TABLET, EXTENDED RELEASE ORAL
Qty: 180 TABLET | Refills: 1 | Status: SHIPPED | OUTPATIENT
Start: 2022-11-08

## 2022-11-13 RX ORDER — BACLOFEN 10 MG/1
TABLET ORAL
Qty: 90 TABLET | Refills: 2 | Status: SHIPPED | OUTPATIENT
Start: 2022-11-13 | End: 2023-03-28

## 2022-11-21 RX ORDER — CLONIDINE 0.2 MG/24H
PATCH, EXTENDED RELEASE TRANSDERMAL
Qty: 12 PATCH | Refills: 3 | Status: SHIPPED | OUTPATIENT
Start: 2022-11-21

## 2022-12-06 ENCOUNTER — TELEPHONE (OUTPATIENT)
Dept: ENDOCRINOLOGY | Facility: CLINIC | Age: 77
End: 2022-12-06

## 2022-12-06 ENCOUNTER — HOSPITAL ENCOUNTER (OUTPATIENT)
Dept: MAMMOGRAPHY | Facility: HOSPITAL | Age: 77
Discharge: HOME | End: 2022-12-06
Admitting: FAMILY MEDICINE

## 2022-12-06 DIAGNOSIS — Z12.31 VISIT FOR SCREENING MAMMOGRAM: ICD-10-CM

## 2022-12-06 PROCEDURE — 77067 SCR MAMMO BI INCL CAD: CPT | Performed by: RADIOLOGY

## 2022-12-06 PROCEDURE — 77067 SCR MAMMO BI INCL CAD: CPT

## 2022-12-06 PROCEDURE — 77063 BREAST TOMOSYNTHESIS BI: CPT | Performed by: RADIOLOGY

## 2022-12-06 PROCEDURE — 77063 BREAST TOMOSYNTHESIS BI: CPT

## 2022-12-06 RX ORDER — BLOOD-GLUCOSE METER
1 EACH MISCELLANEOUS SEE ADMIN INSTRUCTIONS
Qty: 1 KIT | Refills: 0 | Status: SHIPPED | OUTPATIENT
Start: 2022-12-06

## 2022-12-06 RX ORDER — LEVOTHYROXINE SODIUM 0.12 MG/1
125 TABLET ORAL DAILY
Qty: 90 TABLET | Refills: 1 | Status: SHIPPED | OUTPATIENT
Start: 2022-12-06

## 2022-12-06 RX ORDER — LANCETS
EACH MISCELLANEOUS
Qty: 102 EACH | Refills: 3 | Status: SHIPPED | OUTPATIENT
Start: 2022-12-06

## 2022-12-07 RX ORDER — FUROSEMIDE 20 MG/1
TABLET ORAL
Qty: 180 TABLET | Refills: 0 | Status: SHIPPED | OUTPATIENT
Start: 2022-12-07 | End: 2023-03-06

## 2022-12-08 ENCOUNTER — APPOINTMENT (OUTPATIENT)
Dept: MAMMOGRAPHY | Facility: HOSPITAL | Age: 77
End: 2022-12-08

## 2022-12-12 RX ORDER — CARVEDILOL 25 MG/1
TABLET ORAL
Qty: 180 TABLET | Refills: 0 | Status: SHIPPED | OUTPATIENT
Start: 2022-12-12 | End: 2023-03-09

## 2022-12-13 DIAGNOSIS — I10 ESSENTIAL HYPERTENSION: ICD-10-CM

## 2022-12-13 RX ORDER — AMLODIPINE BESYLATE 5 MG/1
TABLET ORAL
Qty: 180 TABLET | Refills: 1 | Status: SHIPPED | OUTPATIENT
Start: 2022-12-13

## 2022-12-27 RX ORDER — LEVOTHYROXINE SODIUM 0.12 MG/1
TABLET ORAL
Qty: 90 TABLET | Refills: 1 | OUTPATIENT
Start: 2022-12-27

## 2022-12-27 NOTE — TELEPHONE ENCOUNTER
Duplicate request RX E- scripted for 90 days with 1 refill  levothyroxine (SYNTHROID, LEVOTHROID) 125 MCG tablet [[id number]]     Order Details  Dose: 125 mcg Route: Oral Frequency: Daily   Dispense Quantity: 90 tablet Refills: 1          Sig: Take 1 tablet by mouth Daily.         Start Date: 12/06/22 End Date: --   Written Date: 12/06/22 Expiration Date: 12/06/23   Original Order:  levothyroxine (SYNTHROID, LEVOTHROID) 125 MCG tablet [[id number]]   Providers    Ordering Provider and Authorizing Provider:    Ramírez Calabrese MD   [address] [address]   Phone:  [phone]   Fax:  [phone]   NPI:  [id number]        Ordering User:  Ramírez Calabrese MD          Pharmacy    [id number] - [place], [place] - [address] [address] - [phone]  - [phone] [address] [address]   Phone:  [phone]  Fax:  [phone]     Lab Test Results    Component Date/Time Value Range & Unit Status   TSH 10/17/22 1319 1.210 0.270 - 4.200 uIU/mL Final     Warnings Override History    No Interaction Warnings Shown      Pharmacist Clinical Review History    This prescription has not been clinically reviewed.     Order Reconciliation Actions       Order Reconciliation Actions     E-Prescribing Status    Outpatient Medication Detail    levothyroxine (SYNTHROID, LEVOTHROID) 125 MCG tablet        Sig: Take 1 tablet by mouth Daily.        Sent to pharmacy as: Levothyroxine Sodium 125 MCG Oral Tablet (SYNTHROID, LEVOTHROID)        Class: Normal        Route: Oral        E-Prescribing Status: Receipt confirmed by pharmacy (12/6/2022  8:33 AM EST)

## 2023-01-25 ENCOUNTER — OFFICE VISIT (OUTPATIENT)
Dept: ENDOCRINOLOGY | Facility: CLINIC | Age: 78
End: 2023-01-25
Payer: MEDICARE

## 2023-01-25 VITALS
RESPIRATION RATE: 16 BRPM | DIASTOLIC BLOOD PRESSURE: 82 MMHG | HEIGHT: 65 IN | WEIGHT: 167.4 LBS | BODY MASS INDEX: 27.89 KG/M2 | SYSTOLIC BLOOD PRESSURE: 134 MMHG | TEMPERATURE: 96.9 F

## 2023-01-25 DIAGNOSIS — I10 ESSENTIAL HYPERTENSION: ICD-10-CM

## 2023-01-25 DIAGNOSIS — E11.9 TYPE 2 DIABETES MELLITUS WITHOUT COMPLICATION, WITHOUT LONG-TERM CURRENT USE OF INSULIN: Primary | ICD-10-CM

## 2023-01-25 DIAGNOSIS — E04.2 NONTOXIC MULTINODULAR GOITER: ICD-10-CM

## 2023-01-25 DIAGNOSIS — E03.9 HYPOTHYROIDISM, ADULT: ICD-10-CM

## 2023-01-25 DIAGNOSIS — E78.01 FAMILIAL HYPERCHOLESTEROLEMIA: ICD-10-CM

## 2023-01-25 LAB
EXPIRATION DATE: NORMAL
EXPIRATION DATE: NORMAL
GLUCOSE BLDC GLUCOMTR-MCNC: 127 MG/DL (ref 70–130)
HBA1C MFR BLD: 6.3 %
Lab: NORMAL
Lab: NORMAL

## 2023-01-25 PROCEDURE — 82947 ASSAY GLUCOSE BLOOD QUANT: CPT | Performed by: INTERNAL MEDICINE

## 2023-01-25 PROCEDURE — 83036 HEMOGLOBIN GLYCOSYLATED A1C: CPT | Performed by: INTERNAL MEDICINE

## 2023-01-25 PROCEDURE — 3044F HG A1C LEVEL LT 7.0%: CPT | Performed by: INTERNAL MEDICINE

## 2023-01-25 PROCEDURE — 99214 OFFICE O/P EST MOD 30 MIN: CPT | Performed by: INTERNAL MEDICINE

## 2023-01-25 NOTE — PROGRESS NOTES
"     Office Note      Date: 2023  Patient Name: Leila Pierre  MRN: [id number]  : 1945    Chief Complaint   Patient presents with   • Diabetes     3 month follow up   • Hypothyroidism       History of Present Illness:   Leila Pierre is a 77 y.o. female who presents for Diabetes type 2. Diagnosed in: . Treated in past with oral agents. Current treatments: metformin. Number of insulin shots per day: none. Checks blood sugar 1 time a day. Has low blood sugar: no. Aspirin use: Yes. Statin use: No - intolerant. ACE-I/ARB use: Yes. Changes in health since last visit: none. Last eye exam 2022.     She remains on T4 125mcg qd. She is taking this correctly. She isn't taking any interfering meds concurrently. She denies any sxs of hypo- or hyperthyroidism at this time. She hasn't noted any change in the size of her neck. She denies any compressive sxs.    Subjective      Diabetic Complications:  Eyes: No  Kidneys: No  Feet: No  Heart: No    Diet and Exercise:  Meals per day: 3  Minutes of exercise per week: 0 mins.    Review of Systems:   Review of Systems   Constitutional: Negative.    Cardiovascular: Negative.    Gastrointestinal: Negative.    Endocrine: Negative.        The following portions of the patient's history were reviewed and updated as appropriate: allergies, current medications, past family history, past medical history, past social history, past surgical history and problem list.    Objective       Visit Vitals  /82   Temp 96.9 °F (36.1 °C)   Resp 16   Ht 165.1 cm (65\")   Wt 75.9 kg (167 lb 6.4 oz)   LMP  (LMP Unknown)   BMI 27.86 kg/m²       Physical Exam:  Physical Exam  Constitutional:       Appearance: Normal appearance.   Neurological:      Mental Status: She is alert.         Labs:    HbA1c  Lab Results   Component Value Date    HGBA1C 6.3 2023       CMP  Lab Results   Component Value Date    GLUCOSE 123 (H) 2022    BUN 10 2022    CREATININE 0.88 " 06/28/2022    EGFRIFNONA 66 02/24/2022    EGFRIFAFRI 77 02/26/2015    BCR 11.4 06/28/2022    K 4.3 06/28/2022    CO2 24.0 06/28/2022    CALCIUM 9.8 06/28/2022    PROTENTOTREF 6.9 02/26/2015    LABIL2 2.5 02/26/2015    AST 23 06/28/2022    ALT 24 06/28/2022        Lipid Panel  Lab Results   Component Value Date    CHLPL 223 (H) 09/01/2015    HDL 44 06/28/2022     (H) 06/28/2022    TRIG 186 (H) 06/28/2022        TSH  Lab Results   Component Value Date    TSH 1.210 10/17/2022        Hemoglobin A1C  Lab Results   Component Value Date    HGBA1C 6.3 01/25/2023        Microalbumin/Creatinine  Lab Results   Component Value Date    MALBCRERATIO  06/28/2022      Comment:      Unable to calculate    MICROALBUR <1.2 06/28/2022           Assessment / Plan      Assessment & Plan:  Diagnoses and all orders for this visit:    1. Type 2 diabetes mellitus without complication, without long-term current use of insulin (HCC) (Primary)  Assessment & Plan:  Diabetes is unchanged.   Continue current treatment regimen.  Diabetes will be reassessed in 3 months.    Orders:  -     POC Glucose, Blood  -     POC Glycosylated Hemoglobin (Hb A1C)    2. Essential hypertension  Assessment & Plan:  Hypertension is unchanged.  Continue current treatment regimen.  Blood pressure will be reassessed at the next regular appointment.      3. Familial hypercholesterolemia  Assessment & Plan:  Statin intolerant.      4. Hypothyroidism, adult  Assessment & Plan:  Continue T4 tx.      5. Nontoxic multinodular goiter  Assessment & Plan:  Plan for another u/s in about 9 months.      Current Outpatient Medications   Medication Instructions   • amLODIPine (NORVASC) 5 MG tablet TAKE ONE TABLET BY MOUTH TWICE A DAY   • aspirin 81 mg, Oral, Daily   • baclofen (LIORESAL) 10 MG tablet TAKE ONE TABLET BY MOUTH THREE TIMES A DAY   • BIOTIN 5000 PO 1 tablet, Oral, Daily   • Blood Glucose Monitoring Suppl (Accu-Chek Guide) w/Device kit 1 each, Does not apply, See  Admin Instructions, Dx E11.65   • Calcium Carbonate (CALCIUM 600 PO) 1 tablet, Oral, Daily   • candesartan (ATACAND) 16 MG tablet TAKE 1 TABLET DAILY   • carvedilol (COREG) 25 MG tablet TAKE ONE TABLET BY MOUTH TWICE A DAY WITH MEALS   • cholecalciferol (VITAMIN D3) 1,000 Units, Oral, Daily   • cloNIDine (CATAPRES-TTS) 0.2 MG/24HR patch APPLY 1 PATCH ONTO THE SKIN AS DIRECTED BY PROVIDER ONE TIME PER WEEK   • famotidine (PEPCID) 20 mg, Oral, 2 Times Daily   • fexofenadine (ALLEGRA) 180 mg, Oral, Daily   • furosemide (LASIX) 20 MG tablet TAKE ONE TABLET BY MOUTH TWICE A DAY   • glucose blood (Accu-Chek Guide) test strip USE AS INSTRUCTED TO TEST BLOOD GLUCOSE DAILY.  Dx E11.65   • Lancets (accu-chek multiclix) lancets Use to check blood sugar daily.  Dx E11.65   • levothyroxine (SYNTHROID, LEVOTHROID) 125 mcg, Oral, Daily   • magnesium oxide (MAG-OX) 400 mg, Oral, Daily   • metFORMIN (GLUCOPHAGE) 500 MG tablet TAKE TWO TABLETS BY MOUTH TWICE A DAY WITH MEALS   • potassium chloride (K-DUR,KLOR-CON) 10 MEQ CR tablet TAKE ONE TABLET BY MOUTH TWICE A DAY      Return in about 3 months (around 4/25/2023) for Recheck with A1c, CMP, lipid, TSH, microalbumin, foot exam.    Ramírez Calabrese MD   01/25/2023

## 2023-02-01 ENCOUNTER — OFFICE VISIT (OUTPATIENT)
Dept: FAMILY MEDICINE CLINIC | Facility: CLINIC | Age: 78
End: 2023-02-01
Payer: MEDICARE

## 2023-02-01 VITALS
TEMPERATURE: 98 F | WEIGHT: 166.2 LBS | BODY MASS INDEX: 27.69 KG/M2 | OXYGEN SATURATION: 94 % | HEIGHT: 65 IN | SYSTOLIC BLOOD PRESSURE: 124 MMHG | HEART RATE: 64 BPM | DIASTOLIC BLOOD PRESSURE: 68 MMHG

## 2023-02-01 DIAGNOSIS — I10 ESSENTIAL HYPERTENSION: Primary | ICD-10-CM

## 2023-02-01 PROCEDURE — 3044F HG A1C LEVEL LT 7.0%: CPT | Performed by: FAMILY MEDICINE

## 2023-02-01 PROCEDURE — 99213 OFFICE O/P EST LOW 20 MIN: CPT | Performed by: FAMILY MEDICINE

## 2023-02-02 NOTE — PROGRESS NOTES
"Chief Complaint  essential hypertension  (F/u )    Subjective        Leila Pierre presents to [facility]  Hypertension  This is a chronic (BP doing well at home) problem. The current episode started more than 1 year ago. The problem is unchanged. The problem is controlled. Pertinent negatives include no palpitations. Risk factors for coronary artery disease include post-menopausal state, sedentary lifestyle and stress. Current antihypertension treatment includes central alpha agonists, calcium channel blockers, angiotensin blockers, beta blockers and diuretics. The current treatment provides significant improvement. There are no compliance problems.  There is no history of angina, kidney disease, CAD/MI or left ventricular hypertrophy.       Objective   Vital Signs:  /68   Pulse 64   Temp 98 °F (36.7 °C)   Ht 165.1 cm (65\")   Wt 75.4 kg (166 lb 3.2 oz)   SpO2 94%   BMI 27.66 kg/m²   Estimated body mass index is 27.66 kg/m² as calculated from the following:    Height as of this encounter: 165.1 cm (65\").    Weight as of this encounter: 75.4 kg (166 lb 3.2 oz).             Physical Exam  Vitals and nursing note reviewed.   Constitutional:       Appearance: Normal appearance. She is well-developed.   HENT:      Head: Normocephalic and atraumatic.      Left Ear: External ear normal.   Eyes:      General: No scleral icterus.     Conjunctiva/sclera: Conjunctivae normal.      Pupils: Pupils are equal, round, and reactive to light.   Neck:      Thyroid: No thyromegaly.      Vascular: No carotid bruit.   Cardiovascular:      Rate and Rhythm: Normal rate and regular rhythm.      Heart sounds: Normal heart sounds.   Pulmonary:      Effort: Pulmonary effort is normal.      Breath sounds: Normal breath sounds.   Musculoskeletal:         General: Normal range of motion.      Cervical back: Neck supple.   Skin:     General: Skin is warm and dry.      Findings: No rash. "   Neurological:      General: No focal deficit present.      Mental Status: She is alert and oriented to person, place, and time.      Comments: No focal deficits no lateralizing signs   Psychiatric:         Behavior: Behavior is cooperative.        Result Review :                   Assessment and Plan   Diagnoses and all orders for this visit:    1. Essential hypertension (Primary)             Follow Up   Return in about 3 months (around 5/1/2023) for Recheck.  Patient was given instructions and counseling regarding her condition or for health maintenance advice. Please see specific information pulled into the AVS if appropriate.

## 2023-03-06 RX ORDER — FUROSEMIDE 20 MG/1
TABLET ORAL
Qty: 180 TABLET | Refills: 0 | Status: SHIPPED | OUTPATIENT
Start: 2023-03-06

## 2023-03-08 ENCOUNTER — TELEPHONE (OUTPATIENT)
Dept: FAMILY MEDICINE CLINIC | Facility: CLINIC | Age: 78
End: 2023-03-08

## 2023-03-08 NOTE — TELEPHONE ENCOUNTER
Caller: Leila Pierre    Relationship: Self    Best call back number: [phone]    What is the best time to reach you: ANY    Who are you requesting to speak with (clinical staff, provider,  specific staff member): DR. SINGH    What was the call regarding: PATENT IS NEED A TIER REDUCTION REQUEST SENT IN TO [facility] TO GET A LOWER PRICE ON HER cloNIDine (CATAPRES-TTS) 0.2 MG/24HR patch. SINCE SHE HAS MET HER DEDUCTIBLE NOW [facility] TOLD HER THIS COULD BE DONE BY HER DOCTOR.     [facility] - [address] [phone] [facility] [phone] FX     Do you require a callback: YES PLEASE LET HER KNOW IF THIS WAS DONE

## 2023-03-09 ENCOUNTER — TELEPHONE (OUTPATIENT)
Dept: FAMILY MEDICINE CLINIC | Facility: CLINIC | Age: 78
End: 2023-03-09

## 2023-03-09 RX ORDER — CARVEDILOL 25 MG/1
TABLET ORAL
Qty: 180 TABLET | Refills: 0 | Status: SHIPPED | OUTPATIENT
Start: 2023-03-09

## 2023-03-09 NOTE — TELEPHONE ENCOUNTER
Caller: Zach Pierre    Relationship: Self    Best call back number: [phone]    What is the best time to reach you: ANYTIME    Who are you requesting to speak with (clinical staff, provider,  specific staff member): DONOVAN SINGH    Do you know the name of the person who called: ZACH    What was the call regarding: TEIR REDUCATION FOR THE .2 COLONIDINE PATCH  the insurance the paper need to be returned in 24-72 hrs . case number if [id number] use to to put in over the phone  should be receiving a fax    Do you require a callback: YES

## 2023-03-28 RX ORDER — BACLOFEN 10 MG/1
TABLET ORAL
Qty: 180 TABLET | Refills: 0 | Status: SHIPPED | OUTPATIENT
Start: 2023-03-28

## 2023-03-28 NOTE — TELEPHONE ENCOUNTER
Rx Refill Note  Requested Prescriptions     Pending Prescriptions Disp Refills   • baclofen (LIORESAL) 10 MG tablet [Pharmacy Med Name: Baclofen Oral Tablet 10 MG] 180 tablet 0     Sig: TAKE 1 TABLET BY MOUTH THREE TIMES A DAY      Last office visit with prescribing clinician: 2/1/2023   Last telemedicine visit with prescribing clinician: 5/3/2023   Next office visit with prescribing clinician: 5/3/2023                         Would you like a call back once the refill request has been completed: [] Yes [] No    If the office needs to give you a call back, can they leave a voicemail: [] Yes [] No    Syd Fung MA  03/28/23, 10:32 EDT

## 2023-05-03 ENCOUNTER — OFFICE VISIT (OUTPATIENT)
Dept: FAMILY MEDICINE CLINIC | Facility: CLINIC | Age: 78
End: 2023-05-03
Payer: MEDICARE

## 2023-05-03 VITALS
TEMPERATURE: 98.6 F | OXYGEN SATURATION: 100 % | WEIGHT: 168.3 LBS | SYSTOLIC BLOOD PRESSURE: 126 MMHG | HEIGHT: 65 IN | HEART RATE: 47 BPM | BODY MASS INDEX: 28.04 KG/M2 | DIASTOLIC BLOOD PRESSURE: 64 MMHG

## 2023-05-03 DIAGNOSIS — I10 ESSENTIAL HYPERTENSION: ICD-10-CM

## 2023-05-03 DIAGNOSIS — E11.9 TYPE 2 DIABETES MELLITUS WITHOUT COMPLICATION, WITHOUT LONG-TERM CURRENT USE OF INSULIN: Primary | ICD-10-CM

## 2023-05-03 RX ORDER — POTASSIUM CHLORIDE 750 MG/1
10 TABLET, EXTENDED RELEASE ORAL DAILY
Qty: 90 TABLET | Refills: 1 | Status: SHIPPED | OUTPATIENT
Start: 2023-05-03

## 2023-05-04 ENCOUNTER — OFFICE VISIT (OUTPATIENT)
Dept: ENDOCRINOLOGY | Facility: CLINIC | Age: 78
End: 2023-05-04
Payer: MEDICARE

## 2023-05-04 VITALS
DIASTOLIC BLOOD PRESSURE: 68 MMHG | WEIGHT: 169 LBS | SYSTOLIC BLOOD PRESSURE: 128 MMHG | BODY MASS INDEX: 28.16 KG/M2 | HEART RATE: 68 BPM | OXYGEN SATURATION: 98 % | HEIGHT: 65 IN | RESPIRATION RATE: 18 BRPM

## 2023-05-04 DIAGNOSIS — E11.9 TYPE 2 DIABETES MELLITUS WITHOUT COMPLICATION, WITHOUT LONG-TERM CURRENT USE OF INSULIN: Primary | ICD-10-CM

## 2023-05-04 DIAGNOSIS — E03.9 HYPOTHYROIDISM, ADULT: ICD-10-CM

## 2023-05-04 DIAGNOSIS — E04.2 NONTOXIC MULTINODULAR GOITER: ICD-10-CM

## 2023-05-04 DIAGNOSIS — E78.01 FAMILIAL HYPERCHOLESTEROLEMIA: ICD-10-CM

## 2023-05-04 DIAGNOSIS — I10 ESSENTIAL HYPERTENSION: ICD-10-CM

## 2023-05-04 LAB
ALBUMIN SERPL-MCNC: 4.8 G/DL (ref 3.5–5.2)
ALBUMIN UR-MCNC: <1.2 MG/DL
ALBUMIN/GLOB SERPL: 2 G/DL
ALP SERPL-CCNC: 56 U/L (ref 39–117)
ALT SERPL W P-5'-P-CCNC: 30 U/L (ref 1–33)
ANION GAP SERPL CALCULATED.3IONS-SCNC: 10 MMOL/L (ref 5–15)
AST SERPL-CCNC: 24 U/L (ref 1–32)
BILIRUB SERPL-MCNC: 0.4 MG/DL (ref 0–1.2)
BUN SERPL-MCNC: 15 MG/DL (ref 8–23)
BUN/CREAT SERPL: 18.8 (ref 7–25)
CALCIUM SPEC-SCNC: 9.5 MG/DL (ref 8.6–10.5)
CHLORIDE SERPL-SCNC: 103 MMOL/L (ref 98–107)
CHOLEST SERPL-MCNC: 226 MG/DL (ref 0–200)
CO2 SERPL-SCNC: 26 MMOL/L (ref 22–29)
CREAT SERPL-MCNC: 0.8 MG/DL (ref 0.57–1)
CREAT UR-MCNC: 12.3 MG/DL
EGFRCR SERPLBLD CKD-EPI 2021: 76.5 ML/MIN/1.73
EXPIRATION DATE: NORMAL
GLOBULIN UR ELPH-MCNC: 2.4 GM/DL
GLUCOSE SERPL-MCNC: 117 MG/DL (ref 65–99)
HBA1C MFR BLD: 6 %
HDLC SERPL-MCNC: 40 MG/DL (ref 40–60)
LDLC SERPL CALC-MCNC: 153 MG/DL (ref 0–100)
LDLC/HDLC SERPL: 3.75 {RATIO}
Lab: NORMAL
MICROALBUMIN/CREAT UR: NORMAL MG/G{CREAT}
POTASSIUM SERPL-SCNC: 4.2 MMOL/L (ref 3.5–5.2)
PROT SERPL-MCNC: 7.2 G/DL (ref 6–8.5)
SODIUM SERPL-SCNC: 139 MMOL/L (ref 136–145)
TRIGL SERPL-MCNC: 180 MG/DL (ref 0–150)
TSH SERPL DL<=0.05 MIU/L-ACNC: 1.63 UIU/ML (ref 0.27–4.2)
VLDLC SERPL-MCNC: 33 MG/DL (ref 5–40)

## 2023-05-04 PROCEDURE — 82043 UR ALBUMIN QUANTITATIVE: CPT | Performed by: INTERNAL MEDICINE

## 2023-05-04 PROCEDURE — 80053 COMPREHEN METABOLIC PANEL: CPT | Performed by: INTERNAL MEDICINE

## 2023-05-04 PROCEDURE — 80061 LIPID PANEL: CPT | Performed by: INTERNAL MEDICINE

## 2023-05-04 PROCEDURE — 84443 ASSAY THYROID STIM HORMONE: CPT | Performed by: INTERNAL MEDICINE

## 2023-05-04 PROCEDURE — 82570 ASSAY OF URINE CREATININE: CPT | Performed by: INTERNAL MEDICINE

## 2023-05-04 RX ORDER — EZETIMIBE 10 MG/1
10 TABLET ORAL DAILY
Qty: 90 TABLET | Refills: 3 | Status: SHIPPED | OUTPATIENT
Start: 2023-05-04

## 2023-05-04 NOTE — PROGRESS NOTES
"     Office Note      Date: 2023  Patient Name: Leila Pierre  MRN: [id number]  : 1945    Chief Complaint   Patient presents with   • Diabetes     3mo fu, T2DM       History of Present Illness:   Leila Pierre is a 78 y.o. female who presents for Diabetes type 2. Diagnosed in: . Treated in past with oral agents. Current treatments: metformin. Number of insulin shots per day: none. Checks blood sugar 1 time a day. Has low blood sugar: no. Aspirin use: Yes. Statin use: No - intolerant. ACE-I/ARB use: Yes. Changes in health since last visit: none. Last eye exam 2022.     She remains on T4 125mcg qd. She is taking this correctly. She isn't taking any interfering meds concurrently. She denies any sxs of hypo- or hyperthyroidism at this time. She hasn't noted any change in the size of her neck. She denies any compressive sxs.    Subjective      Diabetic Complications:  Eyes: No  Kidneys: No  Feet: No  Heart: No    Diet and Exercise:  Meals per day: 3  Minutes of exercise per week: 0 mins.    Review of Systems:   Review of Systems   Constitutional: Negative.    Cardiovascular: Negative.    Gastrointestinal: Negative.    Endocrine: Negative.        The following portions of the patient's history were reviewed and updated as appropriate: allergies, current medications, past family history, past medical history, past social history, past surgical history and problem list.    Objective       Visit Vitals  /68   Pulse 68   Resp 18   Ht 165.1 cm (65\")   Wt 76.7 kg (169 lb)   LMP  (LMP Unknown)   SpO2 98%   BMI 28.12 kg/m²       Physical Exam:  Physical Exam  Constitutional:       Appearance: Normal appearance.   Cardiovascular:      Pulses:           Dorsalis pedis pulses are 2+ on the right side and 2+ on the left side.        Posterior tibial pulses are 2+ on the right side and 2+ on the left side.   Feet:      Right foot:      Protective Sensation: 5 sites tested. 5 sites sensed.      Skin " integrity: Skin integrity normal.      Toenail Condition: Right toenails are normal.      Left foot:      Protective Sensation: 5 sites tested. 5 sites sensed.      Skin integrity: Skin integrity normal.      Toenail Condition: Left toenails are normal.   Neurological:      Mental Status: She is alert.         Labs:    HbA1c  Lab Results   Component Value Date    HGBA1C 6.0 05/04/2023       CMP  Lab Results   Component Value Date    GLUCOSE 123 (H) 06/28/2022    BUN 10 06/28/2022    CREATININE 0.88 06/28/2022    EGFRIFNONA 66 02/24/2022    EGFRIFAFRI 77 02/26/2015    BCR 11.4 06/28/2022    K 4.3 06/28/2022    CO2 24.0 06/28/2022    CALCIUM 9.8 06/28/2022    PROTENTOTREF 6.9 02/26/2015    LABIL2 2.5 02/26/2015    AST 23 06/28/2022    ALT 24 06/28/2022        Lipid Panel  Lab Results   Component Value Date    CHLPL 223 (H) 09/01/2015    HDL 44 06/28/2022     (H) 06/28/2022    TRIG 186 (H) 06/28/2022        TSH  Lab Results   Component Value Date    TSH 1.210 10/17/2022        Hemoglobin A1C  Lab Results   Component Value Date    HGBA1C 6.0 05/04/2023        Microalbumin/Creatinine  Lab Results   Component Value Date    MALBCRERATIO  06/28/2022      Comment:      Unable to calculate    MICROALBUR <1.2 06/28/2022           Assessment / Plan      Assessment & Plan:  Diagnoses and all orders for this visit:    1. Type 2 diabetes mellitus without complication, without long-term current use of insulin (Primary)  Assessment & Plan:  Diabetes is unchanged.   Continue current treatment regimen.  Diabetes will be reassessed in 3 months.      2. Essential hypertension  Assessment & Plan:  Hypertension is unchanged.  Continue current treatment regimen.  Blood pressure will be reassessed at the next regular appointment.      3. Familial hypercholesterolemia  Assessment & Plan:  Statin intolerant.  Check lipids today.      4. Hypothyroidism, adult  Assessment & Plan:  Continue T4 tx.  Check TSH today.      5. Nontoxic  multinodular goiter  Assessment & Plan:  Plan for neck u/s next visit.      Current Outpatient Medications   Medication Instructions   • amLODIPine (NORVASC) 5 MG tablet TAKE ONE TABLET BY MOUTH TWICE A DAY   • aspirin 81 mg, Oral, Daily   • baclofen (LIORESAL) 10 MG tablet TAKE 1 TABLET BY MOUTH THREE TIMES A DAY   • BIOTIN 5000 PO 1 tablet, Oral, Daily   • Blood Glucose Monitoring Suppl (Accu-Chek Guide) w/Device kit 1 each, Does not apply, See Admin Instructions, Dx E11.65   • Calcium Carbonate (CALCIUM 600 PO) 1 tablet, Oral, Daily   • candesartan (ATACAND) 16 MG tablet TAKE 1 TABLET DAILY   • carvedilol (COREG) 25 MG tablet TAKE ONE TABLET BY MOUTH TWICE A DAY WITH MEALS   • cholecalciferol (VITAMIN D3) 1,000 Units, Oral, Daily   • cloNIDine (CATAPRES-TTS) 0.2 MG/24HR patch APPLY 1 PATCH ONTO THE SKIN AS DIRECTED BY PROVIDER ONE TIME PER WEEK   • famotidine (PEPCID) 20 mg, Oral, 2 Times Daily   • fexofenadine (ALLEGRA) 180 mg, Oral, Daily   • furosemide (LASIX) 20 MG tablet TAKE ONE TABLET BY MOUTH TWICE A DAY   • glucose blood (Accu-Chek Guide) test strip USE AS INSTRUCTED TO TEST BLOOD GLUCOSE DAILY.  Dx E11.65   • Lancets (accu-chek multiclix) lancets Use to check blood sugar daily.  Dx E11.65   • levothyroxine (SYNTHROID, LEVOTHROID) 125 mcg, Oral, Daily   • magnesium oxide (MAG-OX) 400 mg, Oral, Daily   • metFORMIN (GLUCOPHAGE) 500 MG tablet TAKE TWO TABLETS BY MOUTH TWICE A DAY WITH MEALS   • potassium chloride (K-DUR,KLOR-CON) 10 MEQ CR tablet 10 mEq, Oral, Daily      Return in about 3 months (around 8/4/2023) for Recheck with A1c, TSH, neck u/s.    Ramírez Calabrese MD   05/04/2023

## 2023-05-04 NOTE — PROGRESS NOTES
"Chief Complaint  3 month follow-up (Pt states that she has no concerns for provider/), Medication Problem (Scripts needs to start being sent over to [facility] no longer takes pt insurance), and Hypertension    Subjective        Leila Pierre presents to [facility]  Hypertension  This is a chronic (BP doing well at home) problem. The current episode started more than 1 year ago. The problem is unchanged. The problem is controlled. Pertinent negatives include no palpitations. Risk factors for coronary artery disease include post-menopausal state, sedentary lifestyle and stress. Current antihypertension treatment includes central alpha agonists, calcium channel blockers, angiotensin blockers, beta blockers and diuretics. The current treatment provides significant improvement. There are no compliance problems.  There is no history of angina, kidney disease, CAD/MI or left ventricular hypertrophy.       Objective   Vital Signs:  /64   Pulse (!) 47   Temp 98.6 °F (37 °C) (Infrared)   Ht 165.1 cm (65\")   Wt 76.3 kg (168 lb 4.8 oz)   SpO2 100%   BMI 28.01 kg/m²   Estimated body mass index is 28.01 kg/m² as calculated from the following:    Height as of this encounter: 165.1 cm (65\").    Weight as of this encounter: 76.3 kg (168 lb 4.8 oz).             Physical Exam  Vitals and nursing note reviewed.   Constitutional:       Appearance: Normal appearance. She is well-developed.   HENT:      Head: Normocephalic and atraumatic.      Nose: Nose normal.   Eyes:      General: No scleral icterus.     Conjunctiva/sclera: Conjunctivae normal.      Pupils: Pupils are equal, round, and reactive to light.   Neck:      Thyroid: No thyromegaly.   Cardiovascular:      Rate and Rhythm: Normal rate and regular rhythm.      Heart sounds: Normal heart sounds.   Pulmonary:      Effort: Pulmonary effort is normal.      Breath sounds: Normal breath sounds.   Musculoskeletal:         " General: Normal range of motion.      Cervical back: Neck supple.      Right lower leg: No edema.      Left lower leg: No edema.   Skin:     General: Skin is warm and dry.      Findings: No rash.   Neurological:      Mental Status: She is alert and oriented to person, place, and time.      Comments: No focal deficits no lateralizing signs   Psychiatric:         Behavior: Behavior is cooperative.        Result Review :    Most Recent A1C        1/25/2023    13:10   HGBA1C Most Recent   Hemoglobin A1C 6.3                    Assessment and Plan   Diagnoses and all orders for this visit:    1. Type 2 diabetes mellitus without complication, without long-term current use of insulin (Primary)  -     POC Glycosylated Hemoglobin (Hb A1C)    2. Essential hypertension  -     potassium chloride (K-DUR,KLOR-CON) 10 MEQ CR tablet; Take 1 tablet by mouth Daily.  Dispense: 90 tablet; Refill: 1             Follow Up   Return for Keep Appt..  Patient was given instructions and counseling regarding her condition or for health maintenance advice. Please see specific information pulled into the AVS if appropriate.

## 2023-05-31 RX ORDER — LEVOTHYROXINE SODIUM 0.12 MG/1
TABLET ORAL
Qty: 90 TABLET | Refills: 0 | Status: SHIPPED | OUTPATIENT
Start: 2023-05-31

## 2023-07-28 DIAGNOSIS — I10 ESSENTIAL HYPERTENSION: ICD-10-CM

## 2023-07-31 RX ORDER — CANDESARTAN 16 MG/1
16 TABLET ORAL DAILY
Qty: 90 TABLET | Refills: 3 | Status: SHIPPED | OUTPATIENT
Start: 2023-07-31

## 2023-08-07 ENCOUNTER — OFFICE VISIT (OUTPATIENT)
Dept: FAMILY MEDICINE CLINIC | Facility: CLINIC | Age: 78
End: 2023-08-07
Payer: MEDICARE

## 2023-08-07 VITALS
HEIGHT: 65 IN | DIASTOLIC BLOOD PRESSURE: 74 MMHG | SYSTOLIC BLOOD PRESSURE: 130 MMHG | BODY MASS INDEX: 28.56 KG/M2 | TEMPERATURE: 98.7 F | WEIGHT: 171.4 LBS | HEART RATE: 74 BPM

## 2023-08-07 DIAGNOSIS — E11.9 TYPE 2 DIABETES MELLITUS WITHOUT COMPLICATION, WITHOUT LONG-TERM CURRENT USE OF INSULIN: Primary | ICD-10-CM

## 2023-08-07 DIAGNOSIS — M25.551 RIGHT HIP PAIN: ICD-10-CM

## 2023-08-07 DIAGNOSIS — E55.9 VITAMIN D DEFICIENCY: ICD-10-CM

## 2023-08-07 DIAGNOSIS — I10 ESSENTIAL HYPERTENSION: ICD-10-CM

## 2023-08-07 DIAGNOSIS — E78.01 FAMILIAL HYPERCHOLESTEROLEMIA: ICD-10-CM

## 2023-08-07 LAB
EXPIRATION DATE: NORMAL
HBA1C MFR BLD: 5.9 %
Lab: NORMAL

## 2023-08-07 PROCEDURE — 1159F MED LIST DOCD IN RCRD: CPT | Performed by: FAMILY MEDICINE

## 2023-08-07 PROCEDURE — 3078F DIAST BP <80 MM HG: CPT | Performed by: FAMILY MEDICINE

## 2023-08-07 PROCEDURE — 3044F HG A1C LEVEL LT 7.0%: CPT | Performed by: FAMILY MEDICINE

## 2023-08-07 PROCEDURE — 3008F BODY MASS INDEX DOCD: CPT | Performed by: FAMILY MEDICINE

## 2023-08-07 PROCEDURE — 99214 OFFICE O/P EST MOD 30 MIN: CPT | Performed by: FAMILY MEDICINE

## 2023-08-07 PROCEDURE — 1160F RVW MEDS BY RX/DR IN RCRD: CPT | Performed by: FAMILY MEDICINE

## 2023-08-07 PROCEDURE — 83036 HEMOGLOBIN GLYCOSYLATED A1C: CPT | Performed by: FAMILY MEDICINE

## 2023-08-07 PROCEDURE — 3075F SYST BP GE 130 - 139MM HG: CPT | Performed by: FAMILY MEDICINE

## 2023-08-07 NOTE — PROGRESS NOTES
Answers submitted by the patient for this visit:  Other (Submitted on 7/31/2023)  Please describe your symptoms.: 3 month checkup  Have you had these symptoms before?: No  How long have you been having these symptoms?: 1-4 days  Please list any medications you are currently taking for this condition.: None  Please describe any probable cause for these symptoms. : None  Primary Reason for Visit (Submitted on 7/31/2023)  What is the primary reason for your visit?: Other          Subjective     Chief Complaint  Hip Pain (R hip pain)    Subjective          Leila Pierre is a 78 y.o. female who presents today to [facility] for initial evaluation .    HPI:   Hip Pain       Ms. Pierre is a pleasant 78 year old female who presents today to follow-up on her chronic medical conditions and reestablish care with a primary care physician as her physician retired recently.    Her past medical history includes hypertension, hyperlipidemia, type 2 diabetes, hypothyroidism, vitamin D deficiency obstructive sleep apnea.      For her hypertension she is currently prescribed candesartan 16 mg, amlodipine 5 mg daily, Coreg 25 mg twice daily, clonidine patch with great control her blood pressure in office today is 130/74.  Her blood glucose is very well controlled with metformin 1,000 mg HgA1c  in office today is 5.9%      Acutely she reports that she had COVID in June and has had some fatigue since.  Otherwise she has no acute complaints.        The following portions of the patient's history were reviewed and updated as appropriate: allergies, current medications, past family history, past medical history, past social history, past surgical history and problem list.    Objective     Objective     Allergy:   Allergies   Allergen Reactions    Adhesive Tape Unknown - Low Severity    Clarithromycin     Decongest-Aid [Pseudoephedrine]      Gives too much energy and patient is unable to sleep     Irbesartan     Lescol [Fluvastatin Sodium] Diarrhea    Lipitor [Atorvastatin] Myalgia    Other      bandaids     Sulfa Antibiotics Unknown (See Comments)    Valsartan     Sulfabenzamide Rash        Current Medications:   Current Outpatient Medications   Medication Sig Dispense Refill    amLODIPine (NORVASC) 5 MG tablet TAKE 1 TABLET BY MOUTH TWO TIMES A  tablet 0    aspirin 81 MG EC tablet Take 1 tablet by mouth Daily.      baclofen (LIORESAL) 10 MG tablet TAKE 1 TABLET BY MOUTH 3 TIMES A  tablet 0    BIOTIN 5000 PO Take 1 tablet by mouth Daily.      Blood Glucose Monitoring Suppl (Accu-Chek Guide) w/Device kit 1 each See Admin Instructions. Dx E11.65 1 kit 0    Calcium Carbonate (CALCIUM 600 PO) Take 1 tablet by mouth Daily.      candesartan (ATACAND) 16 MG tablet Take 1 tablet by mouth Daily. 90 tablet 3    carvedilol (COREG) 25 MG tablet TAKE 1 TABLET BY MOUTH 2 TIMES A DAY WITH MEALS 180 tablet 0    cholecalciferol (VITAMIN D3) 1000 UNITS tablet Take 1 tablet by mouth Daily.      cloNIDine (CATAPRES-TTS) 0.2 MG/24HR patch APPLY 1 PATCH ONTO THE SKIN AS DIRECTED BY PROVIDER ONE TIME PER WEEK 12 patch 3    famotidine (PEPCID) 20 MG tablet Take 1 tablet by mouth 2 (Two) Times a Day.      fexofenadine (ALLEGRA) 180 MG tablet Take 1 tablet by mouth Daily.      furosemide (LASIX) 20 MG tablet TAKE 1 TABLET BY MOUTH 2 TIMES A  tablet 0    glucose blood (Accu-Chek Guide) test strip USE AS INSTRUCTED TO TEST BLOOD GLUCOSE DAILY.  Dx E11.65 100 each 3    Lancets (accu-chek multiclix) lancets Use to check blood sugar daily.  Dx E11.65 102 each 3    levothyroxine (SYNTHROID, LEVOTHROID) 125 MCG tablet TAKE 1 TABLET BY MOUTH EVERY DAY 90 tablet 0    magnesium oxide (MAG-OX) 400 MG tablet Take 1 tablet by mouth Daily. 90 tablet 0    metFORMIN (GLUCOPHAGE) 500 MG tablet TAKE TWO TABLETS BY MOUTH TWICE A DAY WITH MEALS 360 tablet 3    potassium chloride (K-DUR,KLOR-CON) 10 MEQ CR tablet Take 1 tablet by  "mouth Daily. 90 tablet 1     No current facility-administered medications for this visit.       Past Medical History:  Past Medical History:   Diagnosis Date    Arthritis     Diabetes mellitus     GERD (gastroesophageal reflux disease)     Hyperlipidemia     Hypertension     Hypothyroidism     Hypothyroidism     Melanoma     Melanoma in situ of right lower leg     1996    Non-toxic multinodular goiter     Overweight (BMI 25.0-29.9)     Thyroid disorder     Type 2 diabetes mellitus without complication        Past Surgical History:  Past Surgical History:   Procedure Laterality Date    BREAST EXCISIONAL BIOPSY Left     2002    BREAST SURGERY      biopsy benign    HYSTEROSCOPY ENDOMETRIAL ABLATION N/A     SKIN CANCER EXCISION      excision of melanoma-Abstracted from [place]    TUBAL ABDOMINAL LIGATION         Social History:  Social History     Socioeconomic History    Marital status:    Tobacco Use    Smoking status: Never     Passive exposure: Never    Smokeless tobacco: Never   Vaping Use    Vaping Use: Never used   Substance and Sexual Activity    Alcohol use: Yes     Alcohol/week: 1.0 - 2.0 standard drink     Types: 1 - 2 Glasses of wine per week     Comment: occasional wine    Drug use: No    Sexual activity: Defer       Family History:  Family History   Problem Relation Age of Onset    Cancer Mother     Ovarian cancer Mother 85    Hypertension Mother     Hypothyroidism Mother     Hypertension Father     Aneurysm Brother     Breast cancer Neg Hx          Vital Signs:   /74   Pulse 74   Temp 98.7 øF (37.1 øC) (Infrared)   Ht 165.1 cm (65\")   Wt 77.7 kg (171 lb 6.4 oz)   BMI 28.52 kg/mý      Physical Exam:  Physical Exam  Constitutional:       Appearance: She is not ill-appearing.   Eyes:      Pupils: Pupils are equal, round, and reactive to light.   Cardiovascular:      Rate and Rhythm: Normal rate.      Pulses: Normal pulses.   Pulmonary:      Effort: Pulmonary effort is normal.      Breath " sounds: Normal breath sounds.   Abdominal:      General: Abdomen is flat.   Neurological:      General: No focal deficit present.      Mental Status: She is alert. Mental status is at baseline.   Psychiatric:         Mood and Affect: Mood normal.         Behavior: Behavior normal.         Thought Content: Thought content normal.         Judgment: Judgment normal.             PHQ-9 Score  PHQ-9 Total Score:       Lab Review  Office Visit on 08/07/2023   Component Date Value Ref Range Status    Hemoglobin A1C 08/07/2023 5.9  % Final    Lot Number 08/07/2023 [id number]   Final    Expiration Date 08/07/2023 4/23/25   Final        Radiology Results        Assessment / Plan         Assessment and Plan   Diagnoses and all orders for this visit:    1. Type 2 diabetes mellitus without complication, without long-term current use of insulin (Primary)  Assessment & Plan:  Diabetes is improving with treatment.   Continue current treatment regimen.  Dietary recommendations for ADA diet.  Diabetes will be reassessed in 3 months.    Orders:  -     POC Glycosylated Hemoglobin (Hb A1C)    2. Essential hypertension  Assessment & Plan:  Hypertension is improving with treatment.  Continue current treatment regimen.  Dietary sodium restriction.  Weight loss.  Continue current medications.  Ambulatory blood pressure monitoring.  Blood pressure will be reassessed in 3 months.      3. Familial hypercholesterolemia  Assessment & Plan:  Lipid abnormalities are improving with treatment.  Nutritional counseling was provided. and Pharmacotherapy as ordered.  Lipids will be reassessed in 6 months.      4. Vitamin D deficiency  Assessment & Plan:  Continue replacement therapy      5. Right hip pain  Assessment & Plan:  Chronic and significant.  Plans for a hip replacement sometime this fall or spring  with Ortho          Discussed possible differential diagnoses, testing, treatment, recommended non-pharmacological interventions, risks, warning  signs to monitor for that would indicate need for follow-up in clinic or ER. If no improvement with these regimens or you have new or worsening symptoms follow-up. Patient verbalizes understanding and agreement with plan of care. Denies further needs or concerns.     Patient was given instructions and counseling regarding her condition and for health maintenance advised.            BMI is >= 25 and <30. (Overweight) The following options were offered after discussion;: weight loss educational material (shared in after visit summary)           Health Maintenance  Health Maintenance: There are no preventive care reminders to display for this patient.     Meds ordered during this visit  No orders of the defined types were placed in this encounter.      Meds stopped during this visit:  Medications Discontinued During This Encounter   Medication Reason    fluocinonide (LIDEX) 0.05 % cream *Therapy completed    ezetimibe (ZETIA) 10 MG tablet     hepatitis A (Havrix) 1440 EL U/ML vaccine         Visit Diagnoses    ICD-10-CM ICD-9-CM   1. Type 2 diabetes mellitus without complication, without long-term current use of insulin  E11.9 250.00   2. Essential hypertension  I10 401.9   3. Familial hypercholesterolemia  E78.01 272.0   4. Vitamin D deficiency  E55.9 268.9   5. Right hip pain  M25.551 719.45       Patient was given instructions and counseling regarding her condition or for health maintenance advice. Please see specific information pulled into the AVS if appropriate.     Follow Up   Return in about 3 months (around 11/7/2023) for Recheck.          This document has been electronically signed by Mariam Miranda DO   August 7, 2023 17:02 EDT    Dictated Utilizing Dragon Dictation: Part of this note may be an electronic transcription/translation of spoken language to printed text using the Dragon Dictation System.    Mariam Miranda D.O.  [facility] [place]

## 2023-08-28 RX ORDER — LEVOTHYROXINE SODIUM 0.12 MG/1
TABLET ORAL
Qty: 90 TABLET | Refills: 3 | Status: SHIPPED | OUTPATIENT
Start: 2023-08-28

## 2023-08-28 NOTE — TELEPHONE ENCOUNTER
Last visit 5/4/23  Next visit 10/3/23     This writer spoke with patient's step-mother/primary caregiver, Eli. Patient has been on [facility] wait list since 1/18/2022. Patient is in school and has an IEP. School has concerns of Autism. Scheduled in-person appt with [facility] Fellow and Dr. Napoles on Tuesday, 5/23/23 @ 9:30 a.m. Per Eli, this will be the first appointment regarding any delays. Parents will bring a copy of IEP to the appointment. Emailed to father appt reminder, location/parking/check-in info, [place], and no show policy. Eli has intake's number to call if need to cancel/reschedule the appointment. Emailed the \"Authorization to Treat a Minor in Absence of Parent/Guardian\"  form for father to sign if a family member will be attending this appointment without father present.

## 2023-09-26 RX ORDER — BACLOFEN 10 MG/1
10 TABLET ORAL 3 TIMES DAILY
Qty: 180 TABLET | Refills: 0 | Status: SHIPPED | OUTPATIENT
Start: 2023-09-26

## 2023-09-29 DIAGNOSIS — I10 ESSENTIAL HYPERTENSION: ICD-10-CM

## 2023-09-29 RX ORDER — CARVEDILOL 25 MG/1
25 TABLET ORAL 2 TIMES DAILY WITH MEALS
Qty: 180 TABLET | Refills: 0 | Status: SHIPPED | OUTPATIENT
Start: 2023-09-29

## 2023-09-29 RX ORDER — AMLODIPINE BESYLATE 5 MG/1
5 TABLET ORAL DAILY
Qty: 180 TABLET | Refills: 0 | Status: SHIPPED | OUTPATIENT
Start: 2023-09-29

## 2023-09-29 NOTE — TELEPHONE ENCOUNTER
Caller:  Leiladanial Tolbert    Relationship: Self    Best call back number: [phone]     Requested Prescriptions:   Requested Prescriptions     Pending Prescriptions Disp Refills    amLODIPine (NORVASC) 5 MG tablet 180 tablet 0     Sig: Take 1 tablet by mouth 2 (Two) Times a Day.    carvedilol (COREG) 25 MG tablet 180 tablet 0     Sig: Take 1 tablet by mouth 2 (Two) Times a Day With Meals.        Pharmacy where request should be sent: [id number] [place] [address] [place] [facility] - [phone]  - [phone] FX     Last office visit with prescribing clinician: 8/7/2023   Last telemedicine visit with prescribing clinician: Visit date not found   Next office visit with prescribing clinician: 11/7/2023     Additional details provided by patient:     Does the patient have less than a 3 day supply:  [] Yes  [x] No    Would you like a call back once the refill request has been completed: [] Yes [x] No    If the office needs to give you a call back, can they leave a voicemail: [] Yes [x] No    Ann Ham   09/29/23 12:59 EDT

## 2023-10-03 ENCOUNTER — OFFICE VISIT (OUTPATIENT)
Dept: ENDOCRINOLOGY | Facility: CLINIC | Age: 78
End: 2023-10-03
Payer: MEDICARE

## 2023-10-03 VITALS
HEIGHT: 65 IN | OXYGEN SATURATION: 100 % | DIASTOLIC BLOOD PRESSURE: 64 MMHG | SYSTOLIC BLOOD PRESSURE: 134 MMHG | BODY MASS INDEX: 27.99 KG/M2 | HEART RATE: 61 BPM | WEIGHT: 168 LBS

## 2023-10-03 DIAGNOSIS — E78.01 FAMILIAL HYPERCHOLESTEROLEMIA: ICD-10-CM

## 2023-10-03 DIAGNOSIS — E04.2 NONTOXIC MULTINODULAR GOITER: ICD-10-CM

## 2023-10-03 DIAGNOSIS — I10 ESSENTIAL HYPERTENSION: ICD-10-CM

## 2023-10-03 DIAGNOSIS — E11.9 TYPE 2 DIABETES MELLITUS WITHOUT COMPLICATION, WITHOUT LONG-TERM CURRENT USE OF INSULIN: Primary | ICD-10-CM

## 2023-10-03 DIAGNOSIS — E03.9 HYPOTHYROIDISM, ADULT: ICD-10-CM

## 2023-10-03 LAB
EXPIRATION DATE: NORMAL
GLUCOSE BLDC GLUCOMTR-MCNC: 83 MG/DL (ref 70–130)
Lab: NORMAL

## 2023-10-03 PROCEDURE — 84443 ASSAY THYROID STIM HORMONE: CPT | Performed by: INTERNAL MEDICINE

## 2023-10-03 NOTE — PROGRESS NOTES
"     Office Note      Date: 10/03/2023  Patient Name: Leila Pierre  MRN: [id number]  : 1945    Chief Complaint   Patient presents with    Diabetes     Type II       History of Present Illness:   Leila Pierre is a 78 y.o. female who presents for Diabetes type 2. Diagnosed in: . Treated in past with oral agents. Current treatments: metformin. Number of insulin shots per day: none. Checks blood sugar 1 time a day. Has low blood sugar: no. Aspirin use: Yes. Statin use: No - intolerant. ACE-I/ARB use: Yes. Changes in health since last visit: none. Last eye exam 2022.     She remains on T4 125mcg qd. She is taking this correctly. She isn't taking any interfering meds concurrently. She denies any sxs of hypo- or hyperthyroidism at this time. She hasn't noted any change in the size of her neck. She denies any compressive sxs.    Subjective      Diabetic Complications:  Eyes: No  Kidneys: No  Feet: No  Heart: No    Diet and Exercise:  Meals per day: 3  Minutes of exercise per week: 0 mins.    Review of Systems:   Review of Systems   Constitutional: Negative.    Cardiovascular: Negative.    Gastrointestinal: Negative.    Endocrine: Negative.      The following portions of the patient's history were reviewed and updated as appropriate: allergies, current medications, past family history, past medical history, past social history, past surgical history, and problem list.    Objective     Visit Vitals  /64 (BP Location: Left arm, Patient Position: Sitting, Cuff Size: Adult)   Pulse 61   Ht 165.1 cm (65\")   Wt 76.2 kg (168 lb)   LMP  (LMP Unknown)   SpO2 100%   BMI 27.96 kg/m²       Physical Exam:  Physical Exam  Constitutional:       Appearance: Normal appearance.   Neurological:      Mental Status: She is alert.       Labs:    HbA1c  Lab Results   Component Value Date    HGBA1C 5.9 2023       CMP  Lab Results   Component Value Date    GLUCOSE 117 (H) 2023    BUN 15 2023    " CREATININE 0.80 05/04/2023    EGFRIFNONA 66 02/24/2022    EGFRIFAFRI 77 02/26/2015    BCR 18.8 05/04/2023    K 4.2 05/04/2023    CO2 26.0 05/04/2023    CALCIUM 9.5 05/04/2023    PROTENTOTREF 6.9 02/26/2015    LABIL2 2.5 02/26/2015    AST 24 05/04/2023    ALT 30 05/04/2023        Lipid Panel  Lab Results   Component Value Date    CHLPL 223 (H) 09/01/2015    HDL 40 05/04/2023     (H) 05/04/2023    TRIG 180 (H) 05/04/2023        TSH  Lab Results   Component Value Date    TSH 1.630 05/04/2023        Hemoglobin A1C  Lab Results   Component Value Date    HGBA1C 5.9 08/07/2023        Microalbumin/Creatinine  Lab Results   Component Value Date    MALBCRERATIO  05/04/2023      Comment:      Unable to calculate    MICROALBUR <1.2 05/04/2023           Assessment / Plan      Assessment & Plan:  Diagnoses and all orders for this visit:    1. Type 2 diabetes mellitus without complication, without long-term current use of insulin (Primary)  Assessment & Plan:  Diabetes is unchanged.   Continue current treatment regimen.  Diabetes will be reassessed in 3 months.    Orders:  -     POC Glucose, Blood  -     Cancel: POC Glycosylated Hemoglobin (Hb A1C)    2. Essential hypertension  Assessment & Plan:  Hypertension is unchanged.  Continue current treatment regimen.  Blood pressure will be reassessed at the next regular appointment.      3. Familial hypercholesterolemia  Assessment & Plan:  Lipids not at goal last visit.  Statin intolerant.  We prescribed ezetimibe.  She was concerned about possibility of it raising her liver enzymes and she decided not to take it.        4. Hypothyroidism, adult  Assessment & Plan:  Continue T4 tx.  Check TSH.    Orders:  -     TSH; Future    5. Nontoxic multinodular goiter  Assessment & Plan:  A neck u/s was performed today.  This revealed heterogeneity of both thyroid lobes.  There was a predominantly cystic nodule in the left lobe that measured 1.8cm.  This appears stable compared to u/s from  12/2020.  No abnormal lymph nodes were seen.      Plan for another u/s in 3 years.    Orders:  -     US Thyroid      Current Outpatient Medications   Medication Instructions    amLODIPine (NORVASC) 5 mg, Oral, Daily    aspirin 81 mg, Oral, Daily    baclofen (LIORESAL) 10 mg, Oral, 3 Times Daily    BIOTIN 5000 PO 1 tablet, Oral, Daily    Blood Glucose Monitoring Suppl (Accu-Chek Guide) w/Device kit 1 each, Does not apply, See Admin Instructions, Dx E11.65    Calcium Carbonate (CALCIUM 600 PO) 1 tablet, Oral, Daily    candesartan (ATACAND) 16 mg, Oral, Daily    carvedilol (COREG) 25 mg, Oral, 2 Times Daily With Meals    cholecalciferol (VITAMIN D3) 1,000 Units, Oral, Daily    cloNIDine (CATAPRES-TTS) 0.2 MG/24HR patch APPLY 1 PATCH ONTO THE SKIN AS DIRECTED BY PROVIDER ONE TIME PER WEEK    famotidine (PEPCID) 20 mg, Oral, 2 Times Daily    fexofenadine (ALLEGRA) 180 mg, Oral, Daily    furosemide (LASIX) 20 MG tablet TAKE 1 TABLET BY MOUTH 2 TIMES A DAY    glucose blood (Accu-Chek Guide) test strip USE AS INSTRUCTED TO TEST BLOOD GLUCOSE DAILY.  Dx E11.65    Lancets (accu-chek multiclix) lancets Use to check blood sugar daily.  Dx E11.65    levothyroxine (SYNTHROID, LEVOTHROID) 125 MCG tablet TAKE 1 TABLET BY MOUTH EVERY DAY    magnesium oxide (MAG-OX) 400 mg, Oral, Daily    metFORMIN (GLUCOPHAGE) 500 MG tablet TAKE TWO TABLETS BY MOUTH TWICE A DAY WITH MEALS    potassium chloride (K-DUR,KLOR-CON) 10 MEQ CR tablet 10 mEq, Oral, Daily      Return in about 3 months (around 1/3/2024) for Recheck with A1c.    Ramírez Calabrese MD   10/03/2023

## 2023-10-03 NOTE — ASSESSMENT & PLAN NOTE
A neck u/s was performed today.  This revealed heterogeneity of both thyroid lobes.  There was a predominantly cystic nodule in the left lobe that measured 1.8cm.  This appears stable compared to u/s from 12/2020.  No abnormal lymph nodes were seen.      Plan for another u/s in 3 years.

## 2023-10-04 LAB — TSH SERPL DL<=0.05 MIU/L-ACNC: 1.67 UIU/ML (ref 0.27–4.2)

## 2023-10-25 ENCOUNTER — TRANSCRIBE ORDERS (OUTPATIENT)
Dept: ADMINISTRATIVE | Facility: HOSPITAL | Age: 78
End: 2023-10-25
Payer: MEDICARE

## 2023-10-25 DIAGNOSIS — Z12.31 SCREENING MAMMOGRAM FOR BREAST CANCER: Primary | ICD-10-CM

## 2023-10-26 RX ORDER — CLONIDINE 0.2 MG/24H
1 PATCH, EXTENDED RELEASE TRANSDERMAL WEEKLY
Qty: 12 PATCH | Refills: 0 | Status: SHIPPED | OUTPATIENT
Start: 2023-10-26

## 2023-10-26 NOTE — TELEPHONE ENCOUNTER
Caller: Leila Pierre    Relationship: Self    Best call back number: [phone]     Requested Prescriptions:   Requested Prescriptions     Pending Prescriptions Disp Refills    cloNIDine (CATAPRES-TTS) 0.2 MG/24HR patch 12 patch 3        Pharmacy where request should be sent: [facility] - [address] [phone] [facility] [phone]      Last office visit with prescribing clinician: 8/7/2023   Last telemedicine visit with prescribing clinician: Visit date not found   Next office visit with prescribing clinician: 11/7/2023     Does the patient have less than a 3 day supply:  [] Yes  [x] No    Would you like a call back once the refill request has been completed: [] Yes [x] No    If the office needs to give you a call back, can they leave a voicemail: [] Yes [x] No    Snow Campo Rep   10/26/23 10:42 EDT

## 2023-11-07 ENCOUNTER — OFFICE VISIT (OUTPATIENT)
Dept: FAMILY MEDICINE CLINIC | Facility: CLINIC | Age: 78
End: 2023-11-07
Payer: MEDICARE

## 2023-11-07 VITALS
SYSTOLIC BLOOD PRESSURE: 130 MMHG | BODY MASS INDEX: 28.49 KG/M2 | DIASTOLIC BLOOD PRESSURE: 74 MMHG | TEMPERATURE: 98.2 F | HEART RATE: 72 BPM | HEIGHT: 65 IN | WEIGHT: 171 LBS

## 2023-11-07 DIAGNOSIS — E03.9 HYPOTHYROIDISM, ADULT: ICD-10-CM

## 2023-11-07 DIAGNOSIS — I10 ESSENTIAL HYPERTENSION: Primary | ICD-10-CM

## 2023-11-07 DIAGNOSIS — E78.01 FAMILIAL HYPERCHOLESTEROLEMIA: ICD-10-CM

## 2023-11-07 DIAGNOSIS — E11.9 TYPE 2 DIABETES MELLITUS WITHOUT COMPLICATION, WITHOUT LONG-TERM CURRENT USE OF INSULIN: ICD-10-CM

## 2023-11-07 DIAGNOSIS — E55.9 VITAMIN D DEFICIENCY: ICD-10-CM

## 2023-11-07 NOTE — ASSESSMENT & PLAN NOTE
Hypertension is improving with treatment.  Continue current treatment regimen.  Medication changes per orders.  Ambulatory blood pressure monitoring.  Blood pressure will be reassessed in 3 months.

## 2023-11-07 NOTE — PROGRESS NOTES
Subjective     Chief Complaint  Diabetes    Subjective          Leila Pierre is a 78 y.o. female who presents today to [facility] for initial evaluation .    HPI:       Ms. Pierre is a pleasant 78 year old female who presents today to follow-up on her chronic medical condition.    Her past medical history includes hypertension, hyperlipidemia, type 2 diabetes, hypothyroidism, vitamin D deficiency obstructive sleep apnea.      For her hypertension she is currently prescribed candesartan 16 mg, amlodipine 5 mg daily, Coreg 25 mg twice daily, clonidine patch with great control her blood pressure in office today is 130/74  Her blood glucose is very well controlled with Metformin 1,000 mg HgA1c  in office three months ago was 5.9%    She reports that she is scheduled for hip replacement on the right side next month and is also scheduled for PAT later this month.     The following portions of the patient's history were reviewed and updated as appropriate: allergies, current medications, past family history, past medical history, past social history, past surgical history and problem list.    Objective     Objective     Allergy:   Allergies   Allergen Reactions    Adhesive Tape Unknown - Low Severity    Clarithromycin     Decongest-Aid [Pseudoephedrine]      Gives too much energy and patient is unable to sleep    Irbesartan     Lescol [Fluvastatin Sodium] Diarrhea    Lipitor [Atorvastatin] Myalgia    Other      bandaids     Sulfa Antibiotics Unknown (See Comments) and Unknown - Low Severity    Valsartan     Sulfabenzamide Rash        Current Medications:   Current Outpatient Medications   Medication Sig Dispense Refill    amLODIPine (NORVASC) 5 MG tablet Take 1 tablet by mouth Daily. 180 tablet 0    aspirin 81 MG EC tablet Take 1 tablet by mouth Daily.      baclofen (LIORESAL) 10 MG tablet Take 1 tablet by mouth 3 (Three) Times a Day. 180 tablet 0    BIOTIN 5000 PO Take 1  tablet by mouth Daily.      Blood Glucose Monitoring Suppl (Accu-Chek Guide) w/Device kit 1 each See Admin Instructions. Dx E11.65 1 kit 0    Calcium Carbonate (CALCIUM 600 PO) Take 1 tablet by mouth Daily.      candesartan (ATACAND) 16 MG tablet Take 1 tablet by mouth Daily. 90 tablet 3    carvedilol (COREG) 25 MG tablet Take 1 tablet by mouth 2 (Two) Times a Day With Meals. 180 tablet 0    cholecalciferol (VITAMIN D3) 1000 UNITS tablet Take 1 tablet by mouth Daily.      cloNIDine (CATAPRES-TTS) 0.2 MG/24HR patch Place 1 patch on the skin as directed by provider 1 (One) Time Per Week. 12 patch 0    famotidine (PEPCID) 20 MG tablet Take 1 tablet by mouth 2 (Two) Times a Day.      fexofenadine (ALLEGRA) 180 MG tablet Take 1 tablet by mouth Daily.      furosemide (LASIX) 20 MG tablet TAKE 1 TABLET BY MOUTH 2 TIMES A  tablet 0    glucose blood (Accu-Chek Guide) test strip USE AS INSTRUCTED TO TEST BLOOD GLUCOSE DAILY.  Dx E11.65 100 each 3    Lancets (accu-chek multiclix) lancets Use to check blood sugar daily.  Dx E11.65 102 each 3    levothyroxine (SYNTHROID, LEVOTHROID) 125 MCG tablet TAKE 1 TABLET BY MOUTH EVERY DAY 90 tablet 3    magnesium oxide (MAG-OX) 400 MG tablet Take 1 tablet by mouth Daily. 90 tablet 0    metFORMIN (GLUCOPHAGE) 500 MG tablet TAKE TWO TABLETS BY MOUTH TWICE A DAY WITH MEALS 360 tablet 3    potassium chloride (K-DUR,KLOR-CON) 10 MEQ CR tablet Take 1 tablet by mouth Daily. 90 tablet 1     No current facility-administered medications for this visit.       Past Medical History:  Past Medical History:   Diagnosis Date    Arthritis     Diabetes mellitus     GERD (gastroesophageal reflux disease)     Hyperlipidemia     Hypertension     Hypothyroidism     Hypothyroidism     Melanoma     Melanoma in situ of right lower leg     1996    Non-toxic multinodular goiter     Overweight (BMI 25.0-29.9)     Thyroid disorder     Type 2 diabetes mellitus without complication        Past Surgical  "History:  Past Surgical History:   Procedure Laterality Date    BREAST EXCISIONAL BIOPSY Left     2002    BREAST SURGERY      biopsy benign    HYSTEROSCOPY ENDOMETRIAL ABLATION N/A     SKIN CANCER EXCISION      excision of melanoma-Abstracted from [place]    TUBAL ABDOMINAL LIGATION         Social History:  Social History     Socioeconomic History    Marital status:    Tobacco Use    Smoking status: Never     Passive exposure: Never    Smokeless tobacco: Never   Vaping Use    Vaping Use: Never used   Substance and Sexual Activity    Alcohol use: Yes     Alcohol/week: 1.0 - 2.0 standard drink of alcohol     Types: 1 - 2 Glasses of wine per week     Comment: occasional wine    Drug use: No    Sexual activity: Defer       Family History:  Family History   Problem Relation Age of Onset    Cancer Mother     Ovarian cancer Mother 85    Hypertension Mother     Hypothyroidism Mother     Hypertension Father     Aneurysm Brother     Breast cancer Neg Hx          Vital Signs:   /74   Pulse 72   Temp 98.2 °F (36.8 °C) (Infrared)   Ht 165.1 cm (65\")   Wt 77.6 kg (171 lb)   BMI 28.46 kg/m²      Physical Exam:  Physical Exam  Constitutional:       Appearance: She is not ill-appearing.   Eyes:      Pupils: Pupils are equal, round, and reactive to light.   Cardiovascular:      Rate and Rhythm: Normal rate.      Pulses: Normal pulses.   Pulmonary:      Effort: Pulmonary effort is normal.      Breath sounds: Normal breath sounds.   Abdominal:      General: Abdomen is flat.   Neurological:      General: No focal deficit present.      Mental Status: She is alert. Mental status is at baseline.   Psychiatric:         Mood and Affect: Mood normal.         Behavior: Behavior normal.         Thought Content: Thought content normal.         Judgment: Judgment normal.               PHQ-9 Score  PHQ-9 Total Score:       Lab Review  Office Visit on 10/03/2023   Component Date Value Ref Range Status    Glucose 10/03/2023 83  70 - " 130 mg/dL Final    Lot Number 10/03/2023 [id number]   Final    Expiration Date 10/03/2023 05/17/24   Final    TSH 10/03/2023 1.670  0.270 - 4.200 uIU/mL Final        Radiology Results        Assessment / Plan         Assessment and Plan   Diagnoses and all orders for this visit:    1. Essential hypertension (Primary)  Assessment & Plan:  Hypertension is improving with treatment.  Continue current treatment regimen.  Medication changes per orders.  Ambulatory blood pressure monitoring.  Blood pressure will be reassessed in 3 months.      2. Type 2 diabetes mellitus without complication, without long-term current use of insulin  Assessment & Plan:  Diabetes is improving with treatment.   Continue current treatment regimen.  Diabetes will be reassessed in 3 months.      3. Familial hypercholesterolemia  Assessment & Plan:  Lipid abnormalities are improving with treatment.  Nutritional counseling was provided. and Pharmacotherapy as ordered.  Lipids will be reassessed in 6 months.      4. Vitamin D deficiency  Assessment & Plan:  Continue replacement therapy      5. Hypothyroidism, adult  Assessment & Plan:  Stable. Continue replacement therapy             Discussed possible differential diagnoses, testing, treatment, recommended non-pharmacological interventions, risks, warning signs to monitor for that would indicate need for follow-up in clinic or ER. If no improvement with these regimens or you have new or worsening symptoms follow-up. Patient verbalizes understanding and agreement with plan of care. Denies further needs or concerns.     Patient was given instructions and counseling regarding her condition and for health maintenance advised.            BMI is >= 25 and <30. (Overweight) The following options were offered after discussion;: weight loss educational material (shared in after visit summary)           Health Maintenance  Health Maintenance:   Health Maintenance Due   Topic Date Due    DXA SCAN  09/17/2023         Meds ordered during this visit  No orders of the defined types were placed in this encounter.      Meds stopped during this visit:  There are no discontinued medications.       Visit Diagnoses    ICD-10-CM ICD-9-CM   1. Essential hypertension  I10 401.9   2. Type 2 diabetes mellitus without complication, without long-term current use of insulin  E11.9 250.00   3. Familial hypercholesterolemia  E78.01 272.0   4. Vitamin D deficiency  E55.9 268.9   5. Hypothyroidism, adult  E03.9 244.9         Patient was given instructions and counseling regarding her condition or for health maintenance advice. Please see specific information pulled into the AVS if appropriate.     Follow Up   Return in about 3 months (around 2/7/2024) for Recheck.          This document has been electronically signed by Mariam Miranda DO   November 7, 2023 10:35 EST    Dictated Utilizing Dragon Dictation: Part of this note may be an electronic transcription/translation of spoken language to printed text using the Dragon Dictation System.    Mariam Miranda D.O.  [facility] [place]

## 2023-11-21 ENCOUNTER — OFFICE VISIT (OUTPATIENT)
Dept: FAMILY MEDICINE CLINIC | Facility: CLINIC | Age: 78
End: 2023-11-21
Payer: MEDICARE

## 2023-11-21 VITALS
DIASTOLIC BLOOD PRESSURE: 79 MMHG | HEIGHT: 65 IN | WEIGHT: 171.2 LBS | SYSTOLIC BLOOD PRESSURE: 143 MMHG | OXYGEN SATURATION: 98 % | HEART RATE: 66 BPM | TEMPERATURE: 98.2 F | BODY MASS INDEX: 28.52 KG/M2

## 2023-11-21 DIAGNOSIS — I10 ESSENTIAL HYPERTENSION: ICD-10-CM

## 2023-11-21 DIAGNOSIS — L03.039 PARONYCHIA OF GREAT TOE: Primary | ICD-10-CM

## 2023-11-21 DIAGNOSIS — Z76.0 MEDICATION REFILL: ICD-10-CM

## 2023-11-21 PROCEDURE — 99214 OFFICE O/P EST MOD 30 MIN: CPT | Performed by: FAMILY MEDICINE

## 2023-11-21 PROCEDURE — 3077F SYST BP >= 140 MM HG: CPT | Performed by: FAMILY MEDICINE

## 2023-11-21 PROCEDURE — 3078F DIAST BP <80 MM HG: CPT | Performed by: FAMILY MEDICINE

## 2023-11-21 RX ORDER — AMOXICILLIN AND CLAVULANATE POTASSIUM 875; 125 MG/1; MG/1
1 TABLET, FILM COATED ORAL 2 TIMES DAILY
Qty: 14 TABLET | Refills: 0 | Status: SHIPPED | OUTPATIENT
Start: 2023-11-21

## 2023-11-21 RX ORDER — BACLOFEN 10 MG/1
10 TABLET ORAL 2 TIMES DAILY
Qty: 180 TABLET | Refills: 0 | Status: SHIPPED | OUTPATIENT
Start: 2023-11-21

## 2023-11-21 RX ORDER — AMLODIPINE BESYLATE 10 MG/1
10 TABLET ORAL DAILY
Qty: 90 TABLET | Refills: 1 | Status: SHIPPED | OUTPATIENT
Start: 2023-11-21

## 2023-11-21 NOTE — ASSESSMENT & PLAN NOTE
Most likely related to pedicure.  I have prescribed Augmentin x 7 days.  Wound care instructions given

## 2023-11-21 NOTE — ASSESSMENT & PLAN NOTE
Hypertension is improving with treatment.  Medication changes per orders.  Ambulatory blood pressure monitoring.  Blood pressure will be reassessed in 3 months.

## 2023-11-21 NOTE — PROGRESS NOTES
Subjective     Chief Complaint  Toe Pain (Per patient she had a pedicure a couple of weeks ago and about 1 day after, her great toe on left foot started hurting and sore. Patient states she is supposed to have surgery on 12/8/223 and needs to make sure no infection. ) and Med Refill (Per patient she needs refill on baclofen. Also needs to discuss the amlodipine- states it should be two a day and not once a day. Needs to go to [facility]. )    Subjective          Leila Didi Pierre is a 78 y.o. female who presents today to [facility] for follow up.    HPI:   History of Present Illness  Ms. Pierre is a pleasant 78-year-old male who presents today to discuss her high blood pressure.  Her blood pressure in office today is 143/79.  Her current prescriptions include amlodipine 5 mg daily, carvedilol 25 mg twice daily clonidine 0.2 mg weekly.  She reports that she thinks that her amlodipine should have been 5 mg twice daily.  Her blood pressures have been quite elevated over the last few days.  She reports concern as she has scheduled for a right-sided hip replacement on December 8.    She also reports that she had a pedicure a few weeks ago and felt uncomfortable feeling with her left great toe.  She reports that she has been putting Neosporin on it and noted that it was slightly improved.      The following portions of the patient's history were reviewed and updated as appropriate: allergies, current medications, past family history, past medical history, past social history, past surgical history and problem list.    Objective     Objective     Allergy:   Allergies   Allergen Reactions    Adhesive Tape Unknown - Low Severity    Clarithromycin     Decongest-Aid [Pseudoephedrine]      Gives too much energy and patient is unable to sleep    Irbesartan     Lescol [Fluvastatin Sodium] Diarrhea    Lipitor [Atorvastatin] Myalgia    Other      bandaids     Sulfa Antibiotics Unknown (See  Comments) and Unknown - Low Severity    Valsartan     Sulfabenzamide Rash        Current Medications:   Current Outpatient Medications   Medication Sig Dispense Refill    amLODIPine (NORVASC) 10 MG tablet Take 1 tablet by mouth Daily. 90 tablet 1    aspirin 81 MG EC tablet Take 1 tablet by mouth Daily.      baclofen (LIORESAL) 10 MG tablet Take 1 tablet by mouth 2 (Two) Times a Day. 180 tablet 0    BIOTIN 5000 PO Take 1 tablet by mouth Daily.      Blood Glucose Monitoring Suppl (Accu-Chek Guide) w/Device kit 1 each See Admin Instructions. Dx E11.65 1 kit 0    Calcium Carbonate (CALCIUM 600 PO) Take 1 tablet by mouth Daily.      candesartan (ATACAND) 16 MG tablet Take 1 tablet by mouth Daily. 90 tablet 3    carvedilol (COREG) 25 MG tablet Take 1 tablet by mouth 2 (Two) Times a Day With Meals. 180 tablet 0    cholecalciferol (VITAMIN D3) 1000 UNITS tablet Take 1 tablet by mouth Daily.      cloNIDine (CATAPRES-TTS) 0.2 MG/24HR patch Place 1 patch on the skin as directed by provider 1 (One) Time Per Week. 12 patch 0    famotidine (PEPCID) 20 MG tablet Take 1 tablet by mouth 2 (Two) Times a Day.      fexofenadine (ALLEGRA) 180 MG tablet Take 1 tablet by mouth Daily.      furosemide (LASIX) 20 MG tablet TAKE 1 TABLET BY MOUTH 2 TIMES A  tablet 0    glucose blood (Accu-Chek Guide) test strip USE AS INSTRUCTED TO TEST BLOOD GLUCOSE DAILY.  Dx E11.65 100 each 3    Lancets (accu-chek multiclix) lancets Use to check blood sugar daily.  Dx E11.65 102 each 3    levothyroxine (SYNTHROID, LEVOTHROID) 125 MCG tablet TAKE 1 TABLET BY MOUTH EVERY DAY 90 tablet 3    magnesium oxide (MAG-OX) 400 MG tablet Take 1 tablet by mouth Daily. 90 tablet 0    metFORMIN (GLUCOPHAGE) 500 MG tablet TAKE TWO TABLETS BY MOUTH TWICE A DAY WITH MEALS 360 tablet 3    potassium chloride (K-DUR,KLOR-CON) 10 MEQ CR tablet Take 1 tablet by mouth Daily. 90 tablet 1    amoxicillin-clavulanate (AUGMENTIN) 875-125 MG per tablet Take 1 tablet by mouth 2  "(Two) Times a Day. 14 tablet 0     No current facility-administered medications for this visit.       Past Medical History:  Past Medical History:   Diagnosis Date    Allergic     Arthritis     Diabetes mellitus     GERD (gastroesophageal reflux disease)     Hyperlipidemia     Hypertension     Hypothyroidism     Hypothyroidism     Melanoma     Melanoma in situ of right lower leg     1996    Non-toxic multinodular goiter     Overweight (BMI 25.0-29.9)     Thyroid disorder     Type 2 diabetes mellitus without complication        Past Surgical History:  Past Surgical History:   Procedure Laterality Date    BREAST EXCISIONAL BIOPSY Left     2002    BREAST SURGERY      biopsy benign    HYSTEROSCOPY ENDOMETRIAL ABLATION N/A     SKIN CANCER EXCISION      excision of melanoma-Abstracted from [place]    TUBAL ABDOMINAL LIGATION         Social History:  Social History     Socioeconomic History    Marital status:    Tobacco Use    Smoking status: Never     Passive exposure: Never    Smokeless tobacco: Never   Vaping Use    Vaping Use: Never used   Substance and Sexual Activity    Alcohol use: Yes     Alcohol/week: 1.0 - 2.0 standard drink of alcohol     Types: 1 - 2 Glasses of wine per week     Comment: occasional wine    Drug use: No    Sexual activity: Defer       Family History:  Family History   Problem Relation Age of Onset    Cancer Mother     Ovarian cancer Mother 85    Hypertension Mother     Hypothyroidism Mother     Hypertension Father     Aneurysm Brother     Breast cancer Neg Hx          Vital Signs:   /79   Pulse 66   Temp 98.2 °F (36.8 °C) (Infrared)   Ht 165.1 cm (65\")   Wt 77.7 kg (171 lb 3.2 oz)   SpO2 98%   BMI 28.49 kg/m²      Physical Exam:  Physical Exam  Constitutional:       Appearance: She is not ill-appearing.   Eyes:      Pupils: Pupils are equal, round, and reactive to light.   Cardiovascular:      Rate and Rhythm: Normal rate.      Pulses: Normal pulses.   Pulmonary:      Effort: " Pulmonary effort is normal.      Breath sounds: Normal breath sounds.   Skin:     Comments: Slight erythema and inflammation surrounding the skin of the left great toe.   Neurological:      General: No focal deficit present.      Mental Status: She is alert. Mental status is at baseline.               PHQ-9 Score  PHQ-9 Total Score:       Lab Review  Office Visit on 10/03/2023   Component Date Value Ref Range Status    Glucose 10/03/2023 83  70 - 130 mg/dL Final    Lot Number 10/03/2023 [id number]   Final    Expiration Date 10/03/2023 05/17/24   Final    TSH 10/03/2023 1.670  0.270 - 4.200 uIU/mL Final        Radiology Results        Assessment / Plan         Assessment and Plan   Diagnoses and all orders for this visit:    1. Paronychia of great toe (Primary)  Assessment & Plan:  Most likely related to pedicure.  I have prescribed Augmentin x 7 days.  Wound care instructions given    Orders:  -     amoxicillin-clavulanate (AUGMENTIN) 875-125 MG per tablet; Take 1 tablet by mouth 2 (Two) Times a Day.  Dispense: 14 tablet; Refill: 0    2. Essential hypertension  Assessment & Plan:  Hypertension is improving with treatment.  Medication changes per orders.  Ambulatory blood pressure monitoring.  Blood pressure will be reassessed in 3 months.    Orders:  -     amLODIPine (NORVASC) 10 MG tablet; Take 1 tablet by mouth Daily.  Dispense: 90 tablet; Refill: 1    3. Medication refill  -     baclofen (LIORESAL) 10 MG tablet; Take 1 tablet by mouth 2 (Two) Times a Day.  Dispense: 180 tablet; Refill: 0        Discussed possible differential diagnoses, testing, treatment, recommended non-pharmacological interventions, risks, warning signs to monitor for that would indicate need for follow-up in clinic or ER. If no improvement with these regimens or you have new or worsening symptoms follow-up. Patient verbalizes understanding and agreement with plan of care. Denies further needs or concerns.     Patient was given instructions and  counseling regarding her condition and for health maintenance advised.              Health Maintenance  Health Maintenance:   Health Maintenance Due   Topic Date Due    DXA SCAN  09/17/2023        Meds ordered during this visit  New Medications Ordered This Visit   Medications    baclofen (LIORESAL) 10 MG tablet     Sig: Take 1 tablet by mouth 2 (Two) Times a Day.     Dispense:  180 tablet     Refill:  0    amLODIPine (NORVASC) 10 MG tablet     Sig: Take 1 tablet by mouth Daily.     Dispense:  90 tablet     Refill:  1    amoxicillin-clavulanate (AUGMENTIN) 875-125 MG per tablet     Sig: Take 1 tablet by mouth 2 (Two) Times a Day.     Dispense:  14 tablet     Refill:  0       Meds stopped during this visit:  Medications Discontinued During This Encounter   Medication Reason    baclofen (LIORESAL) 10 MG tablet Reorder    amLODIPine (NORVASC) 5 MG tablet Reorder        Visit Diagnoses    ICD-10-CM ICD-9-CM   1. Paronychia of great toe  L03.039 681.11   2. Essential hypertension  I10 401.9   3. Medication refill  Z76.0 V68.1       Patient was given instructions and counseling regarding her condition or for health maintenance advice. Please see specific information pulled into the AVS if appropriate.     Follow Up   No follow-ups on file.          This document has been electronically signed by Mariam Miranda DO   November 21, 2023 15:23 EST    Dictated Utilizing Dragon Dictation: Part of this note may be an electronic transcription/translation of spoken language to printed text using the Dragon Dictation System.    Mariam Miranda D.O.  [facility] [place]

## 2023-11-27 DIAGNOSIS — E11.9 TYPE 2 DIABETES MELLITUS WITHOUT COMPLICATION, WITHOUT LONG-TERM CURRENT USE OF INSULIN: ICD-10-CM

## 2023-11-27 NOTE — TELEPHONE ENCOUNTER
Caller: Leila Pierre    Relationship: Self    Best call back number: [phone]     Requested Prescriptions:   Requested Prescriptions     Pending Prescriptions Disp Refills    metFORMIN (GLUCOPHAGE) 500 MG tablet 360 tablet 3     Sig: Take 2 tablets by mouth 2 (Two) Times a Day With Meals.        Pharmacy where request should be sent: [id number] [place] [id number] [place] [facility] [address] - [phone]  - [phone] FX     Last office visit with prescribing clinician: 11/21/2023   Last telemedicine visit with prescribing clinician: Visit date not found   Next office visit with prescribing clinician: 2/8/2024     Additional details provided by patient: 2 DAYS  LEFT     Does the patient have less than a 3 day supply:  [x] Yes  [] No    Would you like a call back once the refill request has been completed: [x] Yes [] No    If the office needs to give you a call back, can they leave a voicemail: [x] Yes [] No    Snow Thomson Rep   11/27/23 11:09 EST

## 2024-01-05 RX ORDER — CARVEDILOL 25 MG/1
25 TABLET ORAL 2 TIMES DAILY WITH MEALS
Qty: 180 TABLET | Refills: 0 | Status: SHIPPED | OUTPATIENT
Start: 2024-01-05

## 2024-01-15 RX ORDER — CLONIDINE 0.2 MG/24H
PATCH, EXTENDED RELEASE TRANSDERMAL
Qty: 12 PATCH | Refills: 3 | Status: SHIPPED | OUTPATIENT
Start: 2024-01-15

## 2024-01-18 ENCOUNTER — PRIOR AUTHORIZATION (OUTPATIENT)
Dept: FAMILY MEDICINE CLINIC | Facility: CLINIC | Age: 79
End: 2024-01-18
Payer: MEDICARE

## 2024-01-24 ENCOUNTER — HOSPITAL ENCOUNTER (OUTPATIENT)
Dept: MAMMOGRAPHY | Facility: HOSPITAL | Age: 79
Discharge: HOME | End: 2024-01-24
Admitting: FAMILY MEDICINE
Payer: MEDICARE

## 2024-01-24 DIAGNOSIS — Z12.31 SCREENING MAMMOGRAM FOR BREAST CANCER: ICD-10-CM

## 2024-01-24 PROCEDURE — 77063 BREAST TOMOSYNTHESIS BI: CPT

## 2024-01-24 PROCEDURE — 77067 SCR MAMMO BI INCL CAD: CPT

## 2024-02-12 ENCOUNTER — OFFICE VISIT (OUTPATIENT)
Dept: FAMILY MEDICINE CLINIC | Facility: CLINIC | Age: 79
End: 2024-02-12
Payer: MEDICARE

## 2024-02-12 VITALS
TEMPERATURE: 97.3 F | HEIGHT: 72 IN | HEART RATE: 70 BPM | OXYGEN SATURATION: 98 % | WEIGHT: 166.2 LBS | DIASTOLIC BLOOD PRESSURE: 74 MMHG | BODY MASS INDEX: 22.51 KG/M2 | SYSTOLIC BLOOD PRESSURE: 122 MMHG

## 2024-02-12 DIAGNOSIS — J10.1 INFLUENZA A: ICD-10-CM

## 2024-02-12 DIAGNOSIS — R05.1 ACUTE COUGH: ICD-10-CM

## 2024-02-12 DIAGNOSIS — J02.9 SORE THROAT: Primary | ICD-10-CM

## 2024-02-12 LAB
EXPIRATION DATE: ABNORMAL
EXPIRATION DATE: NORMAL
FLUAV AG NPH QL: POSITIVE
FLUBV AG NPH QL: NEGATIVE
INTERNAL CONTROL: ABNORMAL
INTERNAL CONTROL: NORMAL
Lab: ABNORMAL
Lab: NORMAL
S PYO AG THROAT QL: NEGATIVE

## 2024-02-12 PROCEDURE — 3078F DIAST BP <80 MM HG: CPT | Performed by: PHYSICIAN ASSISTANT

## 2024-02-12 PROCEDURE — 3074F SYST BP LT 130 MM HG: CPT | Performed by: PHYSICIAN ASSISTANT

## 2024-02-12 PROCEDURE — 99213 OFFICE O/P EST LOW 20 MIN: CPT | Performed by: PHYSICIAN ASSISTANT

## 2024-02-12 PROCEDURE — 1160F RVW MEDS BY RX/DR IN RCRD: CPT | Performed by: PHYSICIAN ASSISTANT

## 2024-02-12 PROCEDURE — 1159F MED LIST DOCD IN RCRD: CPT | Performed by: PHYSICIAN ASSISTANT

## 2024-02-12 PROCEDURE — 87880 STREP A ASSAY W/OPTIC: CPT | Performed by: PHYSICIAN ASSISTANT

## 2024-02-12 PROCEDURE — 87804 INFLUENZA ASSAY W/OPTIC: CPT | Performed by: PHYSICIAN ASSISTANT

## 2024-02-12 RX ORDER — DEXTROMETHORPHAN HYDROBROMIDE AND PROMETHAZINE HYDROCHLORIDE 15; 6.25 MG/5ML; MG/5ML
5 SYRUP ORAL 4 TIMES DAILY PRN
Qty: 240 ML | Refills: 0 | Status: SHIPPED | OUTPATIENT
Start: 2024-02-12 | End: 2024-02-19

## 2024-02-12 RX ORDER — OSELTAMIVIR PHOSPHATE 75 MG/1
75 CAPSULE ORAL 2 TIMES DAILY
Qty: 10 CAPSULE | Refills: 0 | Status: SHIPPED | OUTPATIENT
Start: 2024-02-12 | End: 2024-02-19

## 2024-02-19 ENCOUNTER — OFFICE VISIT (OUTPATIENT)
Dept: FAMILY MEDICINE CLINIC | Facility: CLINIC | Age: 79
End: 2024-02-19
Payer: MEDICARE

## 2024-02-19 VITALS
HEART RATE: 70 BPM | SYSTOLIC BLOOD PRESSURE: 128 MMHG | HEIGHT: 72 IN | WEIGHT: 166.4 LBS | TEMPERATURE: 98.8 F | BODY MASS INDEX: 22.54 KG/M2 | DIASTOLIC BLOOD PRESSURE: 68 MMHG

## 2024-02-19 DIAGNOSIS — I10 ESSENTIAL HYPERTENSION: ICD-10-CM

## 2024-02-19 DIAGNOSIS — J01.00 ACUTE NON-RECURRENT MAXILLARY SINUSITIS: ICD-10-CM

## 2024-02-19 DIAGNOSIS — E11.9 TYPE 2 DIABETES MELLITUS WITHOUT COMPLICATION, WITHOUT LONG-TERM CURRENT USE OF INSULIN: Primary | ICD-10-CM

## 2024-02-19 LAB
EXPIRATION DATE: ABNORMAL
HBA1C MFR BLD: 5.9 % (ref 4.5–5.7)
Lab: ABNORMAL

## 2024-02-19 PROCEDURE — G2211 COMPLEX E/M VISIT ADD ON: HCPCS | Performed by: FAMILY MEDICINE

## 2024-02-19 PROCEDURE — 99214 OFFICE O/P EST MOD 30 MIN: CPT | Performed by: FAMILY MEDICINE

## 2024-02-19 PROCEDURE — 3044F HG A1C LEVEL LT 7.0%: CPT | Performed by: FAMILY MEDICINE

## 2024-02-19 PROCEDURE — 3078F DIAST BP <80 MM HG: CPT | Performed by: FAMILY MEDICINE

## 2024-02-19 PROCEDURE — 83036 HEMOGLOBIN GLYCOSYLATED A1C: CPT | Performed by: FAMILY MEDICINE

## 2024-02-19 PROCEDURE — 3074F SYST BP LT 130 MM HG: CPT | Performed by: FAMILY MEDICINE

## 2024-02-19 RX ORDER — METHYLPREDNISOLONE 4 MG/1
TABLET ORAL
Qty: 21 TABLET | Refills: 0 | Status: SHIPPED | OUTPATIENT
Start: 2024-02-19

## 2024-02-19 RX ORDER — AMOXICILLIN AND CLAVULANATE POTASSIUM 875; 125 MG/1; MG/1
1 TABLET, FILM COATED ORAL 2 TIMES DAILY
Qty: 14 TABLET | Refills: 0 | Status: SHIPPED | OUTPATIENT
Start: 2024-02-19

## 2024-02-19 NOTE — ASSESSMENT & PLAN NOTE
Hypertension is stable and controlled  Continue current treatment regimen.  Blood pressure will be reassessed in 3 months.

## 2024-02-19 NOTE — PROGRESS NOTES
Subjective     Chief Complaint  Diabetes and Cough (Had the flu last week and still has a cough)    Subjective          Leila Pierre is a 78 y.o. female who presents today to [facility] for follow up.    HPI:   Diabetes    Cough        Ms. Pierre is a pleasant 78 year old female who presents today to follow-up on her chronic medical condition.     Her past medical history includes hypertension, hyperlipidemia, type 2 diabetes, hypothyroidism, vitamin D deficiency obstructive sleep apnea.        For her hypertension she is currently prescribed candesartan 16 mg, amlodipine 5 mg daily, Coreg 25 mg twice daily, clonidine patch with great control her blood pressure in office today is 128/68    Her blood glucose is very well controlled with Metformin 1,000 mg HgA1c  in office today was 5.9%    She had the flu one week ago and still has a residual cough, she as been afebrile. She took tamiflu and zithromax. Her cough is productive with clear sputum production.       The following portions of the patient's history were reviewed and updated as appropriate: allergies, current medications, past family history, past medical history, past social history, past surgical history and problem list.    Objective     Objective     Allergy:   Allergies   Allergen Reactions    Adhesive Tape Unknown - Low Severity    Clarithromycin     Decongest-Aid [Pseudoephedrine]      Gives too much energy and patient is unable to sleep    Irbesartan     Lescol [Fluvastatin Sodium] Diarrhea    Lipitor [Atorvastatin] Myalgia    Other      bandaids     Sulfa Antibiotics Unknown (See Comments) and Unknown - Low Severity    Valsartan     Sulfabenzamide Rash        Current Medications:   Current Outpatient Medications   Medication Sig Dispense Refill    amLODIPine (NORVASC) 10 MG tablet Take 1 tablet by mouth Daily. 90 tablet 1    aspirin 81 MG EC tablet Take 1 tablet by mouth Daily.      baclofen (LIORESAL)  10 MG tablet Take 1 tablet by mouth 2 (Two) Times a Day. 180 tablet 0    BIOTIN 5000 PO Take 1 tablet by mouth Daily.      Blood Glucose Monitoring Suppl (Accu-Chek Guide) w/Device kit 1 each See Admin Instructions. Dx E11.65 1 kit 0    Calcium Carbonate (CALCIUM 600 PO) Take 1 tablet by mouth Daily.      candesartan (ATACAND) 16 MG tablet Take 1 tablet by mouth Daily. 90 tablet 3    carvedilol (COREG) 25 MG tablet Take 1 tablet by mouth 2 (Two) Times a Day With Meals. 180 tablet 0    cholecalciferol (VITAMIN D3) 1000 UNITS tablet Take 1 tablet by mouth Daily.      cloNIDine (CATAPRES-TTS) 0.2 MG/24HR patch APPLY 1 PATCH ON THE SKIN AS DIRECTED BY PROVIDER ONCE A WEEK 12 patch 3    famotidine (PEPCID) 20 MG tablet Take 1 tablet by mouth 2 (Two) Times a Day.      fexofenadine (ALLEGRA) 180 MG tablet Take 1 tablet by mouth Daily.      furosemide (LASIX) 20 MG tablet TAKE 1 TABLET BY MOUTH 2 TIMES A  tablet 0    glucose blood (Accu-Chek Guide) test strip USE AS INSTRUCTED TO TEST BLOOD GLUCOSE DAILY.  Dx E11.65 100 each 3    Lancets (accu-chek multiclix) lancets Use to check blood sugar daily.  Dx E11.65 102 each 3    levothyroxine (SYNTHROID, LEVOTHROID) 125 MCG tablet TAKE 1 TABLET BY MOUTH EVERY DAY 90 tablet 3    magnesium oxide (MAG-OX) 400 MG tablet Take 1 tablet by mouth Daily. 90 tablet 0    metFORMIN (GLUCOPHAGE) 500 MG tablet Take 2 tablets by mouth 2 (Two) Times a Day With Meals. 360 tablet 3    potassium chloride (K-DUR,KLOR-CON) 10 MEQ CR tablet Take 1 tablet by mouth Daily. 90 tablet 1    amoxicillin-clavulanate (AUGMENTIN) 875-125 MG per tablet Take 1 tablet by mouth 2 (Two) Times a Day. 14 tablet 0    methylPREDNISolone (MEDROL) 4 MG dose pack Take as directed on package instructions. 21 tablet 0     No current facility-administered medications for this visit.       Past Medical History:  Past Medical History:   Diagnosis Date    Allergic     Arthritis     Diabetes mellitus     GERD  "(gastroesophageal reflux disease)     Hyperlipidemia     Hypertension     Hypothyroidism     Hypothyroidism     Melanoma     Melanoma in situ of right lower leg     1996    Non-toxic multinodular goiter     Overweight (BMI 25.0-29.9)     Thyroid disorder     Type 2 diabetes mellitus without complication        Past Surgical History:  Past Surgical History:   Procedure Laterality Date    BREAST EXCISIONAL BIOPSY Left     2002    BREAST SURGERY      biopsy benign    HYSTEROSCOPY ENDOMETRIAL ABLATION N/A     JOINT REPLACEMENT  12/08/2023    SKIN CANCER EXCISION      excision of melanoma-Abstracted from [place]    TUBAL ABDOMINAL LIGATION         Social History:  Social History     Socioeconomic History    Marital status:    Tobacco Use    Smoking status: Never     Passive exposure: Never    Smokeless tobacco: Never   Vaping Use    Vaping Use: Never used   Substance and Sexual Activity    Alcohol use: Yes     Alcohol/week: 1.0 - 2.0 standard drink of alcohol     Types: 1 - 2 Glasses of wine per week     Comment: occasional wine    Drug use: No    Sexual activity: Not Currently     Partners: Male     Birth control/protection: Tubal ligation       Family History:  Family History   Problem Relation Age of Onset    Cancer Mother     Ovarian cancer Mother 85    Hypertension Mother     Hypothyroidism Mother     Hypertension Father     Aneurysm Brother     Breast cancer Neg Hx          Vital Signs:   /68   Pulse 70   Temp 98.8 °F (37.1 °C) (Infrared)   Ht 195.6 cm (77.01\")   Wt 75.5 kg (166 lb 6.4 oz)   BMI 19.73 kg/m²      Physical Exam:  Physical Exam  Constitutional:       Appearance: She is not ill-appearing.   HENT:      Right Ear: Tympanic membrane normal.      Left Ear: Tympanic membrane normal.      Nose: Mucosal edema, congestion and rhinorrhea present.      Right Turbinates: Swollen.   Eyes:      Pupils: Pupils are equal, round, and reactive to light.   Cardiovascular:      Rate and Rhythm: Normal " rate.      Pulses: Normal pulses.   Pulmonary:      Effort: Pulmonary effort is normal.      Breath sounds: Normal breath sounds. No wheezing.   Neurological:      General: No focal deficit present.      Mental Status: She is alert. Mental status is at baseline.               PHQ-9 Score  PHQ-9 Total Score:       Lab Review  Office Visit on 02/19/2024   Component Date Value Ref Range Status    Hemoglobin A1C 02/19/2024 5.9 (A)  4.5 - 5.7 % Final    Lot Number 02/19/2024 [id number]   Final    Expiration Date 02/19/2024 10/8/25   Final   Office Visit on 02/12/2024   Component Date Value Ref Range Status    Rapid Influenza A Ag 02/12/2024 Positive (A)  Negative Final    Rapid Influenza B Ag 02/12/2024 Negative  Negative Final    Internal Control 02/12/2024 Passed  Passed Final    Lot Number 02/12/2024 [id number]   Final    Expiration Date 02/12/2024 12/03/2025   Final    Rapid Strep A Screen 02/12/2024 Negative  Negative, VALID, INVALID, Not Performed Final    Internal Control 02/12/2024 Passed  Passed Final    Lot Number 02/12/2024 [id number]   Final    Expiration Date 02/12/2024 [id number]   Final        Radiology Results  Mammo Screening Digital Tomosynthesis Bilateral With CAD    Result Date: 1/26/2024  Impression: Benign screening mammogram.  No findings suspicious for malignancy.   ACR BI-RADS CATEGORY:  2, BENIGN  RECOMMENDATION: Yearly mammogram, yearly clinical breast exam, and encourage self breast awareness.  CAD was used.  The standard false negative rate of mammography is between 10% and 25%. Complex patterns or increased breast density will markedly elevate the false negative rate of mammography.  A letter, in lay terminology, with the results of this exam will be mailed to the patient.   At our facility, a triangular marker is positioned over a palpable area of concern indicated by the patient. A [place] marker is placed over a visible skin lesion. A linear marker indicates a scar.  If there is a palpable area  of concern, biopsy should be considered regardless of imaging findings.    This report was finalized on 1/26/2024 12:02 PM by Dr. Sultana Bahena MD.        Assessment / Plan         Assessment and Plan   Diagnoses and all orders for this visit:    1. Type 2 diabetes mellitus without complication, without long-term current use of insulin (Primary)  Assessment & Plan:  Diabetes is stable.   Continue current treatment regimen.  Recommended an ADA diet.  Diabetes will be reassessed in 3 months    Orders:  -     POC Glycosylated Hemoglobin (Hb A1C)    2. Essential hypertension  Assessment & Plan:  Hypertension is stable and controlled  Continue current treatment regimen.  Blood pressure will be reassessed in 3 months.      3. Acute non-recurrent maxillary sinusitis  Assessment & Plan:  Likely initially a viral URI with secondary bacterial infection  Due to signs and symptoms and duration of illness will start antibiotics.  Rx for Augmentin x 7  days sent to pharmacy along with medrol dose melanie   Discussed use of saline nasal rinses and medications as prescribed  Continue allergy medications, Tylenol/ibuprofen as needed.  If symptoms do not improve patient to return to clinic for reevaluation      Orders:  -     amoxicillin-clavulanate (AUGMENTIN) 875-125 MG per tablet; Take 1 tablet by mouth 2 (Two) Times a Day.  Dispense: 14 tablet; Refill: 0  -     methylPREDNISolone (MEDROL) 4 MG dose pack; Take as directed on package instructions.  Dispense: 21 tablet; Refill: 0        Discussed possible differential diagnoses, testing, treatment, recommended non-pharmacological interventions, risks, warning signs to monitor for that would indicate need for follow-up in clinic or ER. If no improvement with these regimens or you have new or worsening symptoms follow-up. Patient verbalizes understanding and agreement with plan of care. Denies further needs or concerns.     Patient was given instructions and counseling regarding her  condition and for health maintenance advised.    BMI is within normal parameters. No other follow-up for BMI required.       BMI is within normal parameters. No other follow-up for BMI required.           Health Maintenance  Health Maintenance:   Health Maintenance Due   Topic Date Due    DXA SCAN  09/17/2023        Meds ordered during this visit  New Medications Ordered This Visit   Medications    amoxicillin-clavulanate (AUGMENTIN) 875-125 MG per tablet     Sig: Take 1 tablet by mouth 2 (Two) Times a Day.     Dispense:  14 tablet     Refill:  0    methylPREDNISolone (MEDROL) 4 MG dose pack     Sig: Take as directed on package instructions.     Dispense:  21 tablet     Refill:  0       Meds stopped during this visit:  Medications Discontinued During This Encounter   Medication Reason    oseltamivir (Tamiflu) 75 MG capsule *Therapy completed    promethazine-dextromethorphan (PROMETHAZINE-DM) 6.25-15 MG/5ML syrup *Therapy completed        Visit Diagnoses    ICD-10-CM ICD-9-CM   1. Type 2 diabetes mellitus without complication, without long-term current use of insulin  E11.9 250.00   2. Essential hypertension  I10 401.9   3. Acute non-recurrent maxillary sinusitis  J01.00 461.0       Patient was given instructions and counseling regarding her condition or for health maintenance advice. Please see specific information pulled into the AVS if appropriate.     Follow Up   Return in about 1 month (around 3/19/2024) for Annual, Next scheduled follow up.          This document has been electronically signed by Mariam Miranda DO   February 19, 2024 11:56 EST    Dictated Utilizing Dragon Dictation: Part of this note may be an electronic transcription/translation of spoken language to printed text using the Dragon Dictation System.    Mariam Miranda D.O.  [facility]

## 2024-02-19 NOTE — ASSESSMENT & PLAN NOTE
Likely initially a viral URI with secondary bacterial infection  Due to signs and symptoms and duration of illness will start antibiotics.  Rx for Augmentin x 7  days sent to pharmacy along with medrol dose melanie   Discussed use of saline nasal rinses and medications as prescribed  Continue allergy medications, Tylenol/ibuprofen as needed.  If symptoms do not improve patient to return to clinic for reevaluation

## 2024-02-19 NOTE — ASSESSMENT & PLAN NOTE
Diabetes is stable.   Continue current treatment regimen.  Recommended an ADA diet.  Diabetes will be reassessed in 3 months

## 2024-03-05 ENCOUNTER — OFFICE VISIT (OUTPATIENT)
Dept: ENDOCRINOLOGY | Facility: CLINIC | Age: 79
End: 2024-03-05
Payer: MEDICARE

## 2024-03-05 VITALS
BODY MASS INDEX: 28.16 KG/M2 | OXYGEN SATURATION: 99 % | SYSTOLIC BLOOD PRESSURE: 126 MMHG | HEART RATE: 62 BPM | WEIGHT: 169 LBS | HEIGHT: 65 IN | DIASTOLIC BLOOD PRESSURE: 72 MMHG

## 2024-03-05 DIAGNOSIS — I10 ESSENTIAL HYPERTENSION: ICD-10-CM

## 2024-03-05 DIAGNOSIS — E78.01 FAMILIAL HYPERCHOLESTEROLEMIA: ICD-10-CM

## 2024-03-05 DIAGNOSIS — E11.9 TYPE 2 DIABETES MELLITUS WITHOUT COMPLICATION, WITHOUT LONG-TERM CURRENT USE OF INSULIN: Primary | ICD-10-CM

## 2024-03-05 DIAGNOSIS — E04.2 NONTOXIC MULTINODULAR GOITER: ICD-10-CM

## 2024-03-05 DIAGNOSIS — E03.9 HYPOTHYROIDISM, ADULT: ICD-10-CM

## 2024-03-05 LAB
EXPIRATION DATE: ABNORMAL
GLUCOSE BLDC GLUCOMTR-MCNC: 149 MG/DL (ref 70–130)
Lab: ABNORMAL

## 2024-03-05 RX ORDER — BLOOD SUGAR DIAGNOSTIC
STRIP MISCELLANEOUS
Qty: 100 EACH | Refills: 3 | Status: SHIPPED | OUTPATIENT
Start: 2024-03-05 | End: 2024-03-06 | Stop reason: SDUPTHER

## 2024-03-05 NOTE — PROGRESS NOTES
"     Office Note      Date: 2024  Patient Name: Leila Pierre  MRN: [id number]  : 1945    Chief Complaint   Patient presents with    Diabetes       History of Present Illness:   Leila Pierre is a 78 y.o. female who presents for Diabetes type 2. Diagnosed in: . Treated in past with oral agents. Current treatments: metformin. Number of insulin shots per day: none. Checks blood sugar 1 time a day. Has low blood sugar: no. Aspirin use: Yes. Statin use: No - intolerant. ACE-I/ARB use: Yes. Changes in health since last visit: influenza. Last eye exam 2023.     She remains on T4 125mcg qd. She is taking this correctly. She isn't taking any interfering meds concurrently. She denies any sxs of hypo- or hyperthyroidism at this time. She hasn't noted any change in the size of her neck. She denies any compressive sxs.    Subjective      Diabetic Complications:  Eyes: No  Kidneys: No  Feet: No  Heart: No    Diet and Exercise:  Meals per day: 3  Minutes of exercise per week: 0 mins.    Review of Systems:   Review of Systems   Constitutional: Negative.    Cardiovascular: Negative.    Gastrointestinal: Negative.    Endocrine: Negative.        The following portions of the patient's history were reviewed and updated as appropriate: allergies, current medications, past family history, past medical history, past social history, past surgical history, and problem list.    Objective     Visit Vitals  /72   Pulse 62   Ht 165.1 cm (65\")   Wt 76.7 kg (169 lb)   LMP  (LMP Unknown)   SpO2 99%   BMI 28.12 kg/m²       Physical Exam:  Physical Exam  Constitutional:       Appearance: Normal appearance.   Neurological:      Mental Status: She is alert.         Labs:    HbA1c  Lab Results   Component Value Date    HGBA1C 5.9 (A) 2024       CMP  Lab Results   Component Value Date    GLUCOSE 117 (H) 2023    BUN 15 2023    CREATININE 0.80 2023    EGFRIFNONA 66 2022    EGFRIFAFRI 77 " 02/26/2015    BCR 18.8 05/04/2023    K 4.2 05/04/2023    CO2 26.0 05/04/2023    CALCIUM 9.5 05/04/2023    PROTENTOTREF 6.9 02/26/2015    LABIL2 2.5 02/26/2015    AST 24 05/04/2023    ALT 30 05/04/2023        Lipid Panel  Lab Results   Component Value Date    CHLPL 223 (H) 09/01/2015    HDL 40 05/04/2023     (H) 05/04/2023    TRIG 180 (H) 05/04/2023        TSH  Lab Results   Component Value Date    TSH 1.670 10/03/2023        Hemoglobin A1C  Lab Results   Component Value Date    HGBA1C 5.9 (A) 02/19/2024        Microalbumin/Creatinine  Lab Results   Component Value Date    MALBCRERATIO  05/04/2023      Comment:      Unable to calculate    MICROALBUR <1.2 05/04/2023           Assessment / Plan      Assessment & Plan:  Diagnoses and all orders for this visit:    1. Type 2 diabetes mellitus without complication, without long-term current use of insulin (Primary)  Assessment & Plan:  Diabetes is stable.   Continue current treatment regimen.  Diabetes will be reassessed in 6 months.    Orders:  -     POC Glucose, Blood    2. Essential hypertension  Assessment & Plan:  Hypertension is stable and controlled  Continue current treatment regimen.  Blood pressure will be reassessed in 6 months.      3. Familial hypercholesterolemia  Assessment & Plan:  Statin intolerant.  Plan to check lipids next visit.      4. Hypothyroidism, adult  Assessment & Plan:  Continue current T4 dose.  Plan to check TSH next visit.      5. Nontoxic multinodular goiter  Assessment & Plan:  Neck u/s was stable last visit.  Plan for another u/s in 2 and 1/2 years.      Other orders  -     glucose blood (Accu-Chek Guide) test strip; USE AS INSTRUCTED TO TEST BLOOD GLUCOSE DAILY.  Dx E11.65  Dispense: 100 each; Refill: 3      Current Outpatient Medications   Medication Instructions    amLODIPine (NORVASC) 10 mg, Oral, Daily    amoxicillin-clavulanate (AUGMENTIN) 875-125 MG per tablet 1 tablet, Oral, 2 Times Daily    aspirin 81 mg, Oral, Daily     baclofen (LIORESAL) 10 mg, Oral, 2 Times Daily    BIOTIN 5000 PO 1 tablet, Oral, Daily    Blood Glucose Monitoring Suppl (Accu-Chek Guide) w/Device kit 1 each, Does not apply, See Admin Instructions, Dx E11.65    Calcium Carbonate (CALCIUM 600 PO) 1 tablet, Oral, Daily    candesartan (ATACAND) 16 mg, Oral, Daily    carvedilol (COREG) 25 mg, Oral, 2 Times Daily With Meals    cholecalciferol 1,000 Units, Oral, Daily    cloNIDine (CATAPRES-TTS) 0.2 MG/24HR patch APPLY 1 PATCH ON THE SKIN AS DIRECTED BY PROVIDER ONCE A WEEK    famotidine (PEPCID) 20 mg, Oral, 2 Times Daily    fexofenadine (ALLEGRA) 180 mg, Oral, Daily    furosemide (LASIX) 20 MG tablet TAKE 1 TABLET BY MOUTH 2 TIMES A DAY    glucose blood (Accu-Chek Guide) test strip USE AS INSTRUCTED TO TEST BLOOD GLUCOSE DAILY.  Dx E11.65    Lancets (accu-chek multiclix) lancets Use to check blood sugar daily.  Dx E11.65    levothyroxine (SYNTHROID, LEVOTHROID) 125 MCG tablet TAKE 1 TABLET BY MOUTH EVERY DAY    magnesium oxide (MAG-OX) 400 mg, Oral, Daily    metFORMIN (GLUCOPHAGE) 1,000 mg, Oral, 2 Times Daily With Meals    methylPREDNISolone (MEDROL) 4 MG dose pack Take as directed on package instructions.    potassium chloride (K-DUR,KLOR-CON) 10 MEQ CR tablet 10 mEq, Oral, Daily      Return in about 6 months (around 9/5/2024) for Recheck with A1c, CMP, lipid, TSH, microalbumin, foot exam.    Electronically signed by: Ramírez Calabrese MD  03/05/2024

## 2024-03-06 ENCOUNTER — TELEPHONE (OUTPATIENT)
Dept: ENDOCRINOLOGY | Facility: CLINIC | Age: 79
End: 2024-03-06
Payer: MEDICARE

## 2024-03-06 RX ORDER — BLOOD SUGAR DIAGNOSTIC
STRIP MISCELLANEOUS
Qty: 100 EACH | Refills: 3 | Status: SHIPPED | OUTPATIENT
Start: 2024-03-06

## 2024-03-08 ENCOUNTER — TELEPHONE (OUTPATIENT)
Dept: FAMILY MEDICINE CLINIC | Facility: CLINIC | Age: 79
End: 2024-03-08

## 2024-03-08 NOTE — TELEPHONE ENCOUNTER
Caller: Leila Pierre    Relationship: Self    Best call back number: [phone]     What form or medical record are you requesting: LETTER FROM PROVIDER STATING PATIENT HAD FLU A ON 02/12/2024 FOR VACATION REFUND.     Who is requesting this form or medical record from you: PATIENT     How would you like to receive the form or medical records (pick-up, mail, fax):   If fax, what is the fax number:  If mail, what is the address:   If pick-up, provide patient with address and location details    Timeframe paperwork needed: ASAP    Additional notes: PATIENT IS NEEDING A LETTER FROM HER PROVIDER STATING THAT SHE WAS DIAGNOSED WITH FLU A ON 02/12/2024 TO TRY AND RECEIVED A VACATION REFUND. PLEASE NOTIFY PATIENT WITH READY FOR .

## 2024-03-18 DIAGNOSIS — Z76.0 MEDICATION REFILL: ICD-10-CM

## 2024-03-19 RX ORDER — BACLOFEN 10 MG/1
10 TABLET ORAL 2 TIMES DAILY
Qty: 180 TABLET | Refills: 0 | Status: SHIPPED | OUTPATIENT
Start: 2024-03-19

## 2024-05-06 DIAGNOSIS — I10 ESSENTIAL HYPERTENSION: ICD-10-CM

## 2024-05-06 RX ORDER — AMLODIPINE BESYLATE 10 MG/1
10 TABLET ORAL DAILY
Qty: 90 TABLET | Refills: 0 | Status: SHIPPED | OUTPATIENT
Start: 2024-05-06

## 2024-05-13 DIAGNOSIS — I10 ESSENTIAL HYPERTENSION: ICD-10-CM

## 2024-05-13 RX ORDER — POTASSIUM CHLORIDE 750 MG/1
10 TABLET, EXTENDED RELEASE ORAL DAILY
Qty: 90 TABLET | Refills: 1 | Status: SHIPPED | OUTPATIENT
Start: 2024-05-13

## 2024-05-13 NOTE — TELEPHONE ENCOUNTER
Caller: PierreLeila    Relationship: Self    Best call back number: [phone]    Requested Prescriptions:   Requested Prescriptions     Pending Prescriptions Disp Refills    potassium chloride (KLOR-CON M10) 10 MEQ CR tablet 90 tablet 1     Sig: Take 1 tablet by mouth Daily.        Pharmacy where request should be sent: [id number] [place] [address] [place] [facility] - [phone]  - [phone]     Last office visit with prescribing clinician: 2/19/2024   Last telemedicine visit with prescribing clinician: Visit date not found   Next office visit with prescribing clinician: 7/23/2024     Additional details provided by patient:    Does the patient have less than a 3 day supply:  [] Yes  [x] No    Would you like a call back once the refill request has been completed: [] Yes [x] No    If the office needs to give you a call back, can they leave a voicemail: [] Yes [x] No    Snow Wilson Rep   05/13/24 10:10 EDT

## 2024-06-17 ENCOUNTER — OFFICE VISIT (OUTPATIENT)
Dept: FAMILY MEDICINE CLINIC | Facility: CLINIC | Age: 79
End: 2024-06-17
Payer: MEDICARE

## 2024-06-17 VITALS
DIASTOLIC BLOOD PRESSURE: 82 MMHG | HEIGHT: 65 IN | TEMPERATURE: 98 F | WEIGHT: 171 LBS | BODY MASS INDEX: 28.49 KG/M2 | HEART RATE: 77 BPM | SYSTOLIC BLOOD PRESSURE: 140 MMHG | OXYGEN SATURATION: 97 %

## 2024-06-17 DIAGNOSIS — J01.00 ACUTE NON-RECURRENT MAXILLARY SINUSITIS: ICD-10-CM

## 2024-06-17 DIAGNOSIS — R05.1 ACUTE COUGH: ICD-10-CM

## 2024-06-17 DIAGNOSIS — Z76.0 MEDICATION REFILL: ICD-10-CM

## 2024-06-17 DIAGNOSIS — E11.9 TYPE 2 DIABETES MELLITUS WITHOUT COMPLICATION, WITHOUT LONG-TERM CURRENT USE OF INSULIN: Primary | ICD-10-CM

## 2024-06-17 LAB
ALBUMIN/CREATININE RATIO, URINE: <30
EXPIRATION DATE: ABNORMAL
EXPIRATION DATE: NORMAL
EXPIRATION DATE: NORMAL
HBA1C MFR BLD: 6.4 % (ref 4.5–5.7)
INTERNAL CONTROL: NORMAL
Lab: ABNORMAL
Lab: NORMAL
Lab: NORMAL
POC CREATININE URINE: 10
POC MICROALBUMIN URINE: 10
SARS-COV-2 AG UPPER RESP QL IA.RAPID: NOT DETECTED

## 2024-06-17 PROCEDURE — 83036 HEMOGLOBIN GLYCOSYLATED A1C: CPT | Performed by: PHYSICIAN ASSISTANT

## 2024-06-17 PROCEDURE — 82044 UR ALBUMIN SEMIQUANTITATIVE: CPT | Performed by: PHYSICIAN ASSISTANT

## 2024-06-17 PROCEDURE — 1159F MED LIST DOCD IN RCRD: CPT | Performed by: PHYSICIAN ASSISTANT

## 2024-06-17 PROCEDURE — 99214 OFFICE O/P EST MOD 30 MIN: CPT | Performed by: PHYSICIAN ASSISTANT

## 2024-06-17 PROCEDURE — 3079F DIAST BP 80-89 MM HG: CPT | Performed by: PHYSICIAN ASSISTANT

## 2024-06-17 PROCEDURE — 87426 SARSCOV CORONAVIRUS AG IA: CPT | Performed by: PHYSICIAN ASSISTANT

## 2024-06-17 PROCEDURE — 3077F SYST BP >= 140 MM HG: CPT | Performed by: PHYSICIAN ASSISTANT

## 2024-06-17 PROCEDURE — 1126F AMNT PAIN NOTED NONE PRSNT: CPT | Performed by: PHYSICIAN ASSISTANT

## 2024-06-17 PROCEDURE — 1160F RVW MEDS BY RX/DR IN RCRD: CPT | Performed by: PHYSICIAN ASSISTANT

## 2024-06-17 PROCEDURE — 3044F HG A1C LEVEL LT 7.0%: CPT | Performed by: PHYSICIAN ASSISTANT

## 2024-06-17 RX ORDER — CEFDINIR 300 MG/1
300 CAPSULE ORAL 2 TIMES DAILY
Qty: 20 CAPSULE | Refills: 0 | Status: SHIPPED | OUTPATIENT
Start: 2024-06-17

## 2024-06-17 RX ORDER — CARVEDILOL 25 MG/1
25 TABLET ORAL 2 TIMES DAILY WITH MEALS
Qty: 180 TABLET | Refills: 0 | Status: SHIPPED | OUTPATIENT
Start: 2024-06-17

## 2024-06-17 RX ORDER — BACLOFEN 10 MG/1
10 TABLET ORAL 2 TIMES DAILY
Qty: 180 TABLET | Refills: 0 | Status: SHIPPED | OUTPATIENT
Start: 2024-06-17

## 2024-06-17 RX ORDER — FLUTICASONE PROPIONATE 50 MCG
2 SPRAY, SUSPENSION (ML) NASAL DAILY
Qty: 18.2 ML | Refills: 5 | Status: SHIPPED | OUTPATIENT
Start: 2024-06-17

## 2024-06-17 NOTE — TELEPHONE ENCOUNTER
Rx Refill Note  Requested Prescriptions     Pending Prescriptions Disp Refills    baclofen (LIORESAL) 10 MG tablet [Pharmacy Med Name: Baclofen Oral Tablet 10 MG] 180 tablet 0     Sig: TAKE 1 TABLET BY MOUTH 2 TIMES A DAY    carvedilol (COREG) 25 MG tablet [Pharmacy Med Name: Carvedilol Oral Tablet 25 MG] 180 tablet 0     Sig: TAKE 1 TABLET BY MOUTH 2 TIMES A DAY WITH MEALS      Last office visit with prescribing clinician: 2/19/2024   Last telemedicine visit with prescribing clinician: Visit date not found   Next office visit with prescribing clinician: 7/23/2024                         Would you like a call back once the refill request has been completed: [] Yes [] No    If the office needs to give you a call back, can they leave a voicemail: [] Yes [] No    Trupti uDpont MA  06/17/24, 11:33 EDT

## 2024-06-17 NOTE — PROGRESS NOTES
Follow Up Office Visit    Date: 2024   Patient Name: Leila Pierre  : 1945   MRN: [id number]     Chief Complaint:    Chief Complaint   Patient presents with    Hoarse     Started Friday.     Cough     Patient was on a cruise and started to get symptoms on Wednesday.     Nasal Congestion       History of Present Illness:   Leila Pierre is a 79 y.o. female.  History of Present Illness  The patient presents for evaluation of laryngitis.    The patient reports an improvement in her laryngitis symptoms, however, she has been experiencing a productive cough with green sputum, which she attributes to her sinuses. The onset of these symptoms was during a cruise in [place], during which she consumed an unfamiliar food. Despite completing a course of DayQuil, her symptoms have not improved. She has also been experiencing diarrhea. She has previously taken Z-Felipe and amoxicillin, both of which she believes may have adversely impacted her system. She has also been taking tramadol and Tylenol for pain management. She has cough medication at home, previously prescribed by Dr. Miranda, but reports that her current symptoms are distinct from her previous illness. She applied Vicks Ab to her throat last night to aid sleep.    The patient's metformin dosage appears to be adequately managing her diabetes. She monitors her blood glucose levels every morning, which have shown fluctuations, typically ranging from 104, 105, 110, and consistently under 120. This morning, her blood glucose level was 128. She is scheduled for a follow-up with her endocrinologist, Dr. Lal, in 10/2024.   She is allergic to SULFA and AMOXICILLIN, which caused her to have stomach bleed and black stool.        Subjective    Review of systems:  Review of Systems     I have reviewed and the following portions of the patient's history were updated as appropriate: past family history, past medical history, past social history, past  surgical history and problem list.    Medications:     Current Outpatient Medications:     amLODIPine (NORVASC) 10 MG tablet, TAKE 1 TABLET BY MOUTH EVERY DAY, Disp: 90 tablet, Rfl: 0    aspirin 81 MG EC tablet, Take 1 tablet by mouth Daily., Disp: , Rfl:     baclofen (LIORESAL) 10 MG tablet, TAKE 1 TABLET BY MOUTH 2 TIMES A DAY, Disp: 180 tablet, Rfl: 0    BIOTIN 5000 PO, Take 1 tablet by mouth Daily., Disp: , Rfl:     Blood Glucose Monitoring Suppl (Accu-Chek Guide) w/Device kit, 1 each See Admin Instructions. Dx E11.65, Disp: 1 kit, Rfl: 0    Calcium Carbonate (CALCIUM 600 PO), Take 1 tablet by mouth Daily., Disp: , Rfl:     candesartan (ATACAND) 16 MG tablet, Take 1 tablet by mouth Daily., Disp: 90 tablet, Rfl: 3    carvedilol (COREG) 25 MG tablet, Take 1 tablet by mouth 2 (Two) Times a Day With Meals., Disp: 180 tablet, Rfl: 0    cholecalciferol (VITAMIN D3) 1000 UNITS tablet, Take 1 tablet by mouth Daily., Disp: , Rfl:     cloNIDine (CATAPRES-TTS) 0.2 MG/24HR patch, APPLY 1 PATCH ON THE SKIN AS DIRECTED BY PROVIDER ONCE A WEEK, Disp: 12 patch, Rfl: 3    famotidine (PEPCID) 20 MG tablet, Take 1 tablet by mouth 2 (Two) Times a Day., Disp: , Rfl:     fexofenadine (ALLEGRA) 180 MG tablet, Take 1 tablet by mouth Daily., Disp: , Rfl:     furosemide (LASIX) 20 MG tablet, TAKE 1 TABLET BY MOUTH 2 TIMES A DAY, Disp: 180 tablet, Rfl: 0    glucose blood (Accu-Chek Guide) test strip, USE AS INSTRUCTED TO TEST BLOOD GLUCOSE DAILY.  Dx E11.65, Disp: 100 each, Rfl: 3    Lancets (accu-chek multiclix) lancets, Use to check blood sugar daily.  Dx E11.65, Disp: 102 each, Rfl: 3    levothyroxine (SYNTHROID, LEVOTHROID) 125 MCG tablet, TAKE 1 TABLET BY MOUTH EVERY DAY, Disp: 90 tablet, Rfl: 3    magnesium oxide (MAG-OX) 400 MG tablet, Take 1 tablet by mouth Daily., Disp: 90 tablet, Rfl: 0    metFORMIN (GLUCOPHAGE) 500 MG tablet, Take 2 tablets by mouth 2 (Two) Times a Day With Meals., Disp: 360 tablet, Rfl: 3    potassium chloride  "(KLOR-CON M10) 10 MEQ CR tablet, Take 1 tablet by mouth Daily., Disp: 90 tablet, Rfl: 1    cefdinir (OMNICEF) 300 MG capsule, Take 1 capsule by mouth 2 (Two) Times a Day., Disp: 20 capsule, Rfl: 0    fluticasone (FLONASE) 50 MCG/ACT nasal spray, 2 sprays into the nostril(s) as directed by provider Daily., Disp: 18.2 mL, Rfl: 5    Allergies:   Allergies   Allergen Reactions    Adhesive Tape Unknown - Low Severity    Amoxicillin GI Bleeding    Clarithromycin     Decongest-Aid [Pseudoephedrine]      Gives too much energy and patient is unable to sleep    Irbesartan     Lescol [Fluvastatin Sodium] Diarrhea    Lipitor [Atorvastatin] Myalgia    Other      bandaids     Sulfa Antibiotics Unknown (See Comments) and Unknown - Low Severity    Valsartan     Sulfabenzamide Rash       Objective   Vital Signs:   Vitals:    06/17/24 0918   BP: 140/82   Pulse: 77   Temp: 98 °F (36.7 °C)   TempSrc: Infrared   SpO2: 97%   Weight: 77.6 kg (171 lb)   Height: 165.1 cm (65\")     Body mass index is 28.46 kg/m².          Physical Exam:   Physical Exam  Vitals and nursing note reviewed.   Constitutional:       Appearance: Normal appearance.   HENT:      Head: Normocephalic and atraumatic.      Right Ear: Tympanic membrane and ear canal normal.      Left Ear: Tympanic membrane and ear canal normal.      Nose: Congestion and rhinorrhea present. Rhinorrhea is purulent.      Right Turbinates: Swollen.      Left Turbinates: Swollen.      Right Sinus: Maxillary sinus tenderness present.      Left Sinus: Maxillary sinus tenderness present.      Mouth/Throat:      Mouth: Mucous membranes are moist.      Pharynx: Oropharynx is clear. No posterior oropharyngeal erythema.   Cardiovascular:      Rate and Rhythm: Normal rate and regular rhythm.   Pulmonary:      Effort: Pulmonary effort is normal.      Breath sounds: Normal breath sounds. No decreased breath sounds, wheezing, rhonchi or rales.   Musculoskeletal:      Cervical back: Neck supple.      " Right lower leg: No edema.      Left lower leg: No edema.   Lymphadenopathy:      Cervical: No cervical adenopathy.   Neurological:      Mental Status: She is alert.          Procedures     Assessment / Plan    Assessment/Plan:   Diagnoses and all orders for this visit:    1. Type 2 diabetes mellitus without complication, without long-term current use of insulin (Primary)  -     POC Glycosylated Hemoglobin (Hb A1C)  -     POCT microalbumin    2. Acute cough  -     POCT SARS-CoV-2 Antigen    3. Acute non-recurrent maxillary sinusitis  -     cefdinir (OMNICEF) 300 MG capsule; Take 1 capsule by mouth 2 (Two) Times a Day.  Dispense: 20 capsule; Refill: 0  -     fluticasone (FLONASE) 50 MCG/ACT nasal spray; 2 sprays into the nostril(s) as directed by provider Daily.  Dispense: 18.2 mL; Refill: 5       Assessment & Plan  1. Laryngitis.  The patient is advised to take Cefdinir and Flonase as instructed. She is also advised to maintain adequate hydration. The patient is instructed to notify me if her symptoms do not improve.      Follow Up:   No follow-ups on file.    Patient or patient representative verbalized consent for the use of Ambient Listening during the visit with  Xenia Rodriguez PA-C for chart documentation. 6/30/2024  16:14 EDT    Xenia Rodriguez PA-C   [facility] [place]

## 2024-07-11 RX ORDER — FUROSEMIDE 20 MG/1
20 TABLET ORAL 2 TIMES DAILY
Qty: 180 TABLET | Refills: 0 | Status: SHIPPED | OUTPATIENT
Start: 2024-07-11

## 2024-07-11 NOTE — TELEPHONE ENCOUNTER
Caller: Leila Pierre    Relationship: Self    Best call back number: [phone]    Requested Prescriptions:   Requested Prescriptions     Pending Prescriptions Disp Refills    furosemide (LASIX) 20 MG tablet 180 tablet 0     Sig: Take 1 tablet by mouth 2 (Two) Times a Day.        Pharmacy where request should be sent: [id number] [address] [facility] [address] - [phone]  - [phone]     Last office visit with prescribing clinician: 2/19/2024   Last telemedicine visit with prescribing clinician: Visit date not found   Next office visit with prescribing clinician: 7/23/2024     Additional details provided by patient: PHARMACY TOLD PATIENT THEY SENT THIS REQUEST OVER ON MONDAY    Does the patient have less than a 3 day supply:  [x] Yes  [] No        Snow Bowers Rep   07/11/24 09:32 EDT

## 2024-07-22 DIAGNOSIS — I10 ESSENTIAL HYPERTENSION: ICD-10-CM

## 2024-07-23 ENCOUNTER — OFFICE VISIT (OUTPATIENT)
Dept: FAMILY MEDICINE CLINIC | Facility: CLINIC | Age: 79
End: 2024-07-23
Payer: MEDICARE

## 2024-07-23 VITALS
HEIGHT: 65 IN | BODY MASS INDEX: 28.16 KG/M2 | WEIGHT: 169 LBS | SYSTOLIC BLOOD PRESSURE: 110 MMHG | DIASTOLIC BLOOD PRESSURE: 78 MMHG | OXYGEN SATURATION: 98 % | HEART RATE: 74 BPM | TEMPERATURE: 98.9 F

## 2024-07-23 DIAGNOSIS — E11.9 TYPE 2 DIABETES MELLITUS WITHOUT COMPLICATION, WITHOUT LONG-TERM CURRENT USE OF INSULIN: ICD-10-CM

## 2024-07-23 DIAGNOSIS — Z78.0 POSTMENOPAUSE: ICD-10-CM

## 2024-07-23 DIAGNOSIS — Z00.00 ENCOUNTER FOR SUBSEQUENT ANNUAL WELLNESS VISIT (AWV) IN MEDICARE PATIENT: Primary | ICD-10-CM

## 2024-07-23 DIAGNOSIS — E55.9 VITAMIN D DEFICIENCY: ICD-10-CM

## 2024-07-23 DIAGNOSIS — E03.9 HYPOTHYROIDISM, ADULT: ICD-10-CM

## 2024-07-23 DIAGNOSIS — Z78.0 POST-MENOPAUSAL: ICD-10-CM

## 2024-07-23 DIAGNOSIS — Z23 NEED FOR VACCINATION: ICD-10-CM

## 2024-07-23 PROCEDURE — 90677 PCV20 VACCINE IM: CPT | Performed by: FAMILY MEDICINE

## 2024-07-23 PROCEDURE — 3078F DIAST BP <80 MM HG: CPT | Performed by: FAMILY MEDICINE

## 2024-07-23 PROCEDURE — G0439 PPPS, SUBSEQ VISIT: HCPCS | Performed by: FAMILY MEDICINE

## 2024-07-23 PROCEDURE — 1160F RVW MEDS BY RX/DR IN RCRD: CPT | Performed by: FAMILY MEDICINE

## 2024-07-23 PROCEDURE — G0009 ADMIN PNEUMOCOCCAL VACCINE: HCPCS | Performed by: FAMILY MEDICINE

## 2024-07-23 PROCEDURE — 1170F FXNL STATUS ASSESSED: CPT | Performed by: FAMILY MEDICINE

## 2024-07-23 PROCEDURE — 1126F AMNT PAIN NOTED NONE PRSNT: CPT | Performed by: FAMILY MEDICINE

## 2024-07-23 PROCEDURE — 1159F MED LIST DOCD IN RCRD: CPT | Performed by: FAMILY MEDICINE

## 2024-07-23 PROCEDURE — 99213 OFFICE O/P EST LOW 20 MIN: CPT | Performed by: FAMILY MEDICINE

## 2024-07-23 PROCEDURE — 3074F SYST BP LT 130 MM HG: CPT | Performed by: FAMILY MEDICINE

## 2024-07-23 NOTE — PROGRESS NOTES
Subjective   The ABCs of the Annual Wellness Visit  Medicare Wellness Visit      Leila Pierre is a 79 y.o. patient who presents for a Medicare Wellness Visit.    The following portions of the patient's history were reviewed and   updated as appropriate: allergies, current medications, past family history, past medical history, past social history, past surgical history, and problem list.    Compared to one year ago, the patient's physical   health is the same.  Compared to one year ago, the patient's mental   health is the same.    Recent Hospitalizations:  She was not admitted to the hospital during the last year.     Current Medical Providers:  Patient Care Team:  Mariam Miranda DO as PCP - General (Family Medicine)  Ramírez Calabrese MD as Consulting Physician (Endocrinology)  Janine Long MD as Consulting Physician (Dermatology)  Benjamin Andres APRN as Nurse Practitioner (Nurse Practitioner)    Outpatient Medications Prior to Visit   Medication Sig Dispense Refill    amLODIPine (NORVASC) 10 MG tablet TAKE 1 TABLET BY MOUTH EVERY DAY 90 tablet 0    aspirin 81 MG EC tablet Take 1 tablet by mouth Daily.      baclofen (LIORESAL) 10 MG tablet TAKE 1 TABLET BY MOUTH 2 TIMES A  tablet 0    BIOTIN 5000 PO Take 1 tablet by mouth Daily.      Blood Glucose Monitoring Suppl (Accu-Chek Guide) w/Device kit 1 each See Admin Instructions. Dx E11.65 1 kit 0    Calcium Carbonate (CALCIUM 600 PO) Take 1 tablet by mouth Daily.      candesartan (ATACAND) 16 MG tablet Take 1 tablet by mouth Daily. 90 tablet 3    carvedilol (COREG) 25 MG tablet TAKE 1 TABLET BY MOUTH 2 TIMES A DAY WITH MEALS 180 tablet 0    cholecalciferol (VITAMIN D3) 1000 UNITS tablet Take 1 tablet by mouth Daily.      cloNIDine (CATAPRES-TTS) 0.2 MG/24HR patch APPLY 1 PATCH ON THE SKIN AS DIRECTED BY PROVIDER ONCE A WEEK 12 patch 3    famotidine (PEPCID) 20 MG tablet Take 1 tablet by mouth 2 (Two) Times a Day.      fexofenadine (ALLEGRA)  180 MG tablet Take 1 tablet by mouth Daily.      furosemide (LASIX) 20 MG tablet Take 1 tablet by mouth 2 (Two) Times a Day. 180 tablet 0    glucose blood (Accu-Chek Guide) test strip USE AS INSTRUCTED TO TEST BLOOD GLUCOSE DAILY.  Dx E11.65 100 each 3    Lancets (accu-chek multiclix) lancets Use to check blood sugar daily.  Dx E11.65 102 each 3    levothyroxine (SYNTHROID, LEVOTHROID) 125 MCG tablet TAKE 1 TABLET BY MOUTH EVERY DAY 90 tablet 3    magnesium oxide (MAG-OX) 400 MG tablet Take 1 tablet by mouth Daily. 90 tablet 0    metFORMIN (GLUCOPHAGE) 500 MG tablet Take 2 tablets by mouth 2 (Two) Times a Day With Meals. 360 tablet 3    potassium chloride (KLOR-CON M10) 10 MEQ CR tablet Take 1 tablet by mouth Daily. 90 tablet 1    cefdinir (OMNICEF) 300 MG capsule Take 1 capsule by mouth 2 (Two) Times a Day. (Patient not taking: Reported on 7/23/2024) 20 capsule 0    fluticasone (FLONASE) 50 MCG/ACT nasal spray 2 sprays into the nostril(s) as directed by provider Daily. (Patient not taking: Reported on 7/23/2024) 18.2 mL 5     No facility-administered medications prior to visit.     No opioid medication identified on active medication list. I have reviewed chart for other potential  high risk medication/s and harmful drug interactions in the elderly.      Aspirin is on active medication list. Aspirin use is indicated based on review of current medical condition/s. Pros and cons of this therapy have been discussed today. Benefits of this medication outweigh potential harm.  Patient has been encouraged to continue taking this medication.  .      Patient Active Problem List   Diagnosis    Elevated alanine aminotransferase (ALT) level    Hyperlipidemia    Vitamin D deficiency    Type 2 diabetes mellitus without complication, without long-term current use of insulin    Generalized osteoarthritis    Eczema    Essential hypertension    Muscle spasm    Mild obstructive sleep apnea    Snoring    Encounter for subsequent  "annual wellness visit (AWV) in Medicare patient    Nontoxic multinodular goiter    Hypothyroidism, adult    Right hip pain    Paronychia of great toe    Acute non-recurrent maxillary sinusitis     Advance Care Planning (Click this link to access ACP Navigator)  Advance Directive is not on file.  ACP discussion was held with the patient during this visit. Patient has an advance directive (not in EMR), copy requested.        Objective   Vitals:    24 1350   BP: 110/78   Pulse: 74   Temp: 98.9 °F (37.2 °C)   TempSrc: Temporal   SpO2: 98%   Weight: 76.7 kg (169 lb)   Height: 165.1 cm (65\")   PainSc: 0-No pain       Estimated body mass index is 28.12 kg/m² as calculated from the following:    Height as of this encounter: 165.1 cm (65\").    Weight as of this encounter: 76.7 kg (169 lb).             Does the patient have evidence of cognitive impairment? No  Lab Results   Component Value Date    HGBA1C 6.4 (A) 2024                                                                                                Health  Risk Assessment    Smoking Status:  Social History     Tobacco Use   Smoking Status Never    Passive exposure: Never   Smokeless Tobacco Never     Alcohol Consumption:  Social History     Substance and Sexual Activity   Alcohol Use Yes    Alcohol/week: 1.0 - 2.0 standard drink of alcohol    Types: 1 - 2 Glasses of wine per week    Comment: occasional wine     Fall Risk Screen:  LIV Fall Risk Assessment was completed, and patient is at LOW risk for falls.Assessment completed on:2024    Depression Screenin/23/2024     1:48 PM   PHQ-2/PHQ-9 Depression Screening   Little Interest or Pleasure in Doing Things 0-->not at all   Feeling Down, Depressed or Hopeless 0-->not at all   PHQ-9: Brief Depression Severity Measure Score 0     Health Habits and Functional and Cognitive Screenin/23/2024     1:49 PM   Functional & Cognitive Status   Do you have difficulty preparing food and " eating? No   Do you have difficulty bathing yourself, getting dressed or grooming yourself? No   Do you have difficulty using the toilet? No   Do you have difficulty moving around from place to place? No   Do you have trouble with steps or getting out of a bed or a chair? No   Current Diet Well Balanced Diet   Dental Exam Up to date   Eye Exam Up to date   Exercise (times per week) 0 times per week   Current Exercises Include No Regular Exercise   Do you need help using the phone?  No   Are you deaf or do you have serious difficulty hearing?  No   Do you need help to go to places out of walking distance? No   Do you need help shopping? No   Do you need help preparing meals?  No   Do you need help with housework?  No   Do you need help with laundry? No   Do you need help taking your medications? No   Do you need help managing money? No   Do you ever drive or ride in a car without wearing a seat belt? No   Have you felt unusual stress, anger or loneliness in the last month? No   Who do you live with? Spouse   If you need help, do you have trouble finding someone available to you? No   Have you been bothered in the last four weeks by sexual problems? No   Do you have difficulty concentrating, remembering or making decisions? No             Age-appropriate Screening Schedule:  Refer to the list below for future screening recommendations based on patient's age, sex and/or medical conditions. Orders for these recommended tests are listed in the plan section. The patient has been provided with a written plan.    Health Maintenance List  Health Maintenance   Topic Date Due    DXA SCAN  09/17/2023    LIPID PANEL  05/04/2024    COVID-19 Vaccine (4 - 2023-24 season) 09/06/2024 (Originally 9/1/2023)    RSV Vaccine - Adults (1 - 1-dose 60+ series) 02/12/2025 (Originally 3/29/2007)    INFLUENZA VACCINE  08/01/2024    BMI FOLLOWUP  11/07/2024    HEMOGLOBIN A1C  12/17/2024    DIABETIC EYE EXAM  12/27/2024    URINE MICROALBUMIN   "06/17/2025    ANNUAL WELLNESS VISIT  07/23/2025    COLORECTAL CANCER SCREENING  11/08/2028    TDAP/TD VACCINES (4 - Td or Tdap) 06/22/2031    HEPATITIS C SCREENING  Completed    ZOSTER VACCINE  Completed    Pneumococcal Vaccine 65+  Discontinued                                                                                                                                                CMS Preventative Services Quick Reference  Risk Factors Identified During Encounter  Fall Risk-High or Moderate: Discussed Fall Prevention in the home  Immunizations Discussed/Encouraged: Influenza  Dental Screening Recommended  Vision Screening Recommended    The above risks/problems have been discussed with the patient.  Pertinent information has been shared with the patient in the After Visit Summary.  An After Visit Summary and PPPS were made available to the patient.    Follow Up:   Next Medicare Wellness visit to be scheduled in 1 year.         Additional E&M Note during same encounter follows:  Patient has additional, significant, and separately identifiable condition(s)/problem(s) that require work above and beyond the Medicare Wellness Visit     Chief Complaint  Medicare Wellness-subsequent    Subjective   HPI  Leila is also being seen today for additional medical problem/s.        Her past medical history includes hypertension, hyperlipidemia, type 2 diabetes, hypothyroidism, vitamin D deficiency obstructive sleep apnea.  She follows with endocrinology for treatment of her diabetes      For her hypertension she is currently prescribed candesartan 16 mg, amlodipine 5 mg daily, Coreg 25 mg twice daily, clonidine patch with great control her blood pressure in office today is 110/78 - she put a new clonidine patch on this morning likely causing her BP to be a little low today           Objective   Vital Signs:  /78   Pulse 74   Temp 98.9 °F (37.2 °C) (Temporal)   Ht 165.1 cm (65\")   Wt 76.7 kg (169 lb)   SpO2 98%   " BMI 28.12 kg/m²   Physical Exam  Vitals reviewed.   Constitutional:       Appearance: She is normal weight.   HENT:      Head: Normocephalic.      Right Ear: Tympanic membrane normal.   Cardiovascular:      Rate and Rhythm: Normal rate and regular rhythm.      Pulses: Normal pulses.      Heart sounds: No murmur heard.  Pulmonary:      Effort: Pulmonary effort is normal. No respiratory distress.      Breath sounds: No wheezing.   Abdominal:      General: Abdomen is flat. Bowel sounds are normal.   Musculoskeletal:         General: No swelling or tenderness.      Cervical back: Normal range of motion.   Neurological:      General: No focal deficit present.      Mental Status: She is alert. Mental status is at baseline.   Psychiatric:         Mood and Affect: Mood normal.         Behavior: Behavior normal.         Thought Content: Thought content normal.         Judgment: Judgment normal.              Assessment and Plan Additional age appropriate preventative wellness advice topics were discussed during today's preventative wellness exam(some topics already addressed during AWV portion of the note above):   Nutrition: Discussed nutrition plan with patient. Information shared in after visit summary. Goal is for a well balanced diet to enhance overall health.     Healthy Weight: Discussed current and goal BMI with patient. Steps to attain this goal discussed. Information shared in after visit summary.     Tobacco Misuse Discussion: Information shared in after visit summary.     Motor Vehicle Safety Discussion:  Wearing Seatbelt While in Motor Vehicle recommendation. Adhering to posted speed limit recommendation.     Injury Prevention Discussion:  Information shared in after visit summary.                    Encounter for subsequent annual wellness visit (AWV) in Medicare patient  Annual wellness exam completed today. Health Maintenance including immunizations was updated and reflected in the chart. Yearly screening labs  were ordered.     Further recommendations to be given once lab data received.     Health advice: healthy food choices with fresh fruits and vegetables, maintain sleep pattern at least 8 hours, avoid texting and distracted driving practices; wear safety belt, engage in regular exercise, maintain healthy weight, use safe sex practices, avoid alcohol and illicit drugs. Maintain immunizations that are up to date. Maintain health maintenance:Colonoscopy DEXA, Mammo, PAP, etc.  Follow up with PCP if struggling with depression or anxiety. Keep regular dental and eye exams. Brush and floss teeth daily.     I suggest they take a daily multivitamin that is age-appropriate (example women's One-A-Day.)  Patient voiced understanding of these instructions.     Follow up Annually for Physical     Type 2 diabetes mellitus without complication, without long-term current use of insulin  Diabetes is stable.   Continue current treatment regimen.  Diabetes will be reassessed in 6 months  Vitamin D deficiency    Hypothyroidism, adult    Postmenopause    Post-menopausal    Need for vaccination      Orders Placed This Encounter   Procedures    DEXA Bone Density Axial     Standing Status:   Future     Standing Expiration Date:   7/23/2025     Order Specific Question:   Is patient taking or have taken long term Glucocorticoid (steroids)?     Answer:   No     Order Specific Question:   Does the patient have rheumatoid arthritis?     Answer:   No     Order Specific Question:   Does the patient have secondary osteoporosis?     Answer:   No     Order Specific Question:   Reason for Exam:     Answer:   screening     Order Specific Question:   Release to patient     Answer:   Routine Release [[id number]]    Pneumococcal Conjugate Vaccine 20-Valent (PCV20)    Comprehensive Metabolic Panel     Standing Status:   Future     Order Specific Question:   Release to patient     Answer:   Routine Release [[id number]]    Hemoglobin A1c     Standing Status:    Future     Order Specific Question:   Release to patient     Answer:   Routine Release [[id number]]    Lipid Panel     Standing Status:   Future     Order Specific Question:   Release to patient     Answer:   Routine Release [[id number]]    TSH Rfx On Abnormal To Free T4     Standing Status:   Future     Order Specific Question:   Release to patient     Answer:   Routine Release [[id number]]    Vitamin B12     Standing Status:   Future     Order Specific Question:   Release to patient     Answer:   Routine Release [[id number]]    Vitamin D,25-Hydroxy     Standing Status:   Future     Order Specific Question:   Release to patient     Answer:   Routine Release [[id number]]    CBC & Differential     Standing Status:   Future     Order Specific Question:   Manual Differential     Answer:   No     Order Specific Question:   Release to patient     Answer:   Routine Release [[id number]]             Follow Up   No follow-ups on file.  Patient was given instructions and counseling regarding her condition or for health maintenance advice. Please see specific information pulled into the AVS if appropriate.

## 2024-07-23 NOTE — LETTER
[facility]  Vaccine Consent Form    Patient Name:  Leila Pierre  Patient :  1945     Vaccine(s) Ordered    Pneumococcal Conjugate Vaccine 20-Valent (PCV20)        Screening Checklist  The following questions should be completed prior to vaccination. If you answer “yes” to any question, it does not necessarily mean you should not be vaccinated. It just means we may need to clarify or ask more questions. If a question is unclear, please ask your healthcare provider to explain it.    Yes No   Any fever or moderate to severe illness today (mild illness and/or antibiotic treatment are not contraindications)?     Do you have a history of a serious reaction to any previous vaccinations, such as anaphylaxis, encephalopathy within 7 days, Guillain-[place] syndrome within 6 weeks, seizure?     Have you received any live vaccine(s) (e.g MMR, JUAREZ) or any other vaccines in the last month (to ensure duplicate doses aren't given)?     Do you have an anaphylactic allergy to latex (DTaP, DTaP-IPV, Hep A, Hep B, MenB, RV, Td, Tdap), baker’s yeast (Hep B, HPV), polysorbates (RSV, nirsevimab, PCV 20, Rotavirrus, Tdap, Shingrix), or gelatin (JUAREZ, MMR)?     Do you have an anaphylactic allergy to neomycin (Rabies, JUAREZ, MMR, IPV, Hep A), polymyxin B (IPV), or streptomycin (IPV)?      Any cancer, leukemia, AIDS, or other immune system disorder? (JUAREZ, MMR, RV)     Do you have a parent, brother, or sister with an immune system problem (if immune competence of vaccine recipient clinically verified, can proceed)? (MMR, JUAREZ)     Any recent steroid treatments for >2 weeks, chemotherapy, or radiation treatment? (JUAREZ, MMR)     Have you received antibody-containing blood transfusions or IVIG in the past 11 months (recommended interval is dependent on product)? (MMR, JUAREZ)     Have you taken antiviral drugs (acyclovir, famciclovir, valacyclovir for JUAREZ) in the last 24 or 48 hours, respectively?      Are you pregnant or planning to  "become pregnant within 1 month? (JUAREZ, MMR, HPV, IPV, MenB, Abrexvy; For Hep B- refer to Engerix-B; For RSV - Abrysvo is indicated for 32-36 weeks of pregnancy from September to January)     For infants, have you ever been told your child has had intussusception or a medical emergency involving obstruction of the intestine (Rotavirus)? If not for an infant, can skip this question.         *Ordering Physicians/APC should be consulted if \"yes\" is checked by the patient or guardian above.  I have received, read, and understand the Vaccine Information Statement (VIS) for each vaccine ordered.  I have considered my or my child's health status as well as the health status of my close contacts.  I have taken the opportunity to discuss my vaccine questions with my or my child's health care provider.   I have requested that the ordered vaccine(s) be given to me or my child.  I understand the benefits and risks of the vaccines.  I understand that I should remain in the clinic for 15 minutes after receiving the vaccine(s).  _________________________________________________________  Signature of Patient or Parent/Legal Guardian ____________________  Date     "

## 2024-07-25 ENCOUNTER — TELEPHONE (OUTPATIENT)
Dept: FAMILY MEDICINE CLINIC | Facility: CLINIC | Age: 79
End: 2024-07-25

## 2024-07-25 DIAGNOSIS — I10 ESSENTIAL HYPERTENSION: ICD-10-CM

## 2024-07-25 RX ORDER — CANDESARTAN 16 MG/1
16 TABLET ORAL DAILY
Qty: 90 TABLET | Refills: 3 | OUTPATIENT
Start: 2024-07-25

## 2024-07-25 RX ORDER — CANDESARTAN 16 MG/1
16 TABLET ORAL DAILY
Qty: 90 TABLET | Refills: 3 | Status: SHIPPED | OUTPATIENT
Start: 2024-07-25

## 2024-07-25 NOTE — TELEPHONE ENCOUNTER
Caller: Leila Pierre    Relationship: Self    Best call back number: [phone]     Requested Prescriptions: CANDESARTAN 16 MG, 1 X DAY, 90 DAYS      Pharmacy where request should be sent: [facility] - [address] [phone] [facility] [phone] FX     Last office visit with prescribing clinician: 7/23/2024   Last telemedicine visit with prescribing clinician: Visit date not found   Next office visit with prescribing clinician: 1/23/2025     Additional details provided by patient:     Does the patient have less than a 3 day supply:  [] Yes  [x] No    Would you like a call back once the refill request has been completed: [x] Yes [] No    If the office needs to give you a call back, can they leave a voicemail: [x] Yes [] No    Snow Causey   07/25/24 09:05 EDT

## 2024-07-26 ENCOUNTER — LAB (OUTPATIENT)
Dept: LAB | Facility: HOSPITAL | Age: 79
End: 2024-07-26
Payer: MEDICARE

## 2024-07-26 DIAGNOSIS — Z00.00 ENCOUNTER FOR SUBSEQUENT ANNUAL WELLNESS VISIT (AWV) IN MEDICARE PATIENT: ICD-10-CM

## 2024-07-26 DIAGNOSIS — E55.9 VITAMIN D DEFICIENCY: ICD-10-CM

## 2024-07-26 DIAGNOSIS — E03.9 HYPOTHYROIDISM, ADULT: ICD-10-CM

## 2024-07-26 DIAGNOSIS — E11.9 TYPE 2 DIABETES MELLITUS WITHOUT COMPLICATION, WITHOUT LONG-TERM CURRENT USE OF INSULIN: ICD-10-CM

## 2024-07-26 LAB
25(OH)D3 SERPL-MCNC: 41.5 NG/ML (ref 30–100)
ALBUMIN SERPL-MCNC: 4.4 G/DL (ref 3.5–5.2)
ALBUMIN/GLOB SERPL: 1.6 G/DL
ALP SERPL-CCNC: 57 U/L (ref 39–117)
ALT SERPL W P-5'-P-CCNC: 19 U/L (ref 1–33)
ANION GAP SERPL CALCULATED.3IONS-SCNC: 14.2 MMOL/L (ref 5–15)
AST SERPL-CCNC: 21 U/L (ref 1–32)
BASOPHILS # BLD AUTO: 0.04 10*3/MM3 (ref 0–0.2)
BASOPHILS NFR BLD AUTO: 0.7 % (ref 0–1.5)
BILIRUB SERPL-MCNC: 0.5 MG/DL (ref 0–1.2)
BUN SERPL-MCNC: 13 MG/DL (ref 8–23)
BUN/CREAT SERPL: 14.4 (ref 7–25)
CALCIUM SPEC-SCNC: 9.8 MG/DL (ref 8.6–10.5)
CHLORIDE SERPL-SCNC: 104 MMOL/L (ref 98–107)
CHOLEST SERPL-MCNC: 198 MG/DL (ref 0–200)
CO2 SERPL-SCNC: 20.8 MMOL/L (ref 22–29)
CREAT SERPL-MCNC: 0.9 MG/DL (ref 0.57–1)
DEPRECATED RDW RBC AUTO: 43.4 FL (ref 37–54)
EGFRCR SERPLBLD CKD-EPI 2021: 66 ML/MIN/1.73
EOSINOPHIL # BLD AUTO: 0.19 10*3/MM3 (ref 0–0.4)
EOSINOPHIL NFR BLD AUTO: 3.2 % (ref 0.3–6.2)
ERYTHROCYTE [DISTWIDTH] IN BLOOD BY AUTOMATED COUNT: 13 % (ref 12.3–15.4)
GLOBULIN UR ELPH-MCNC: 2.8 GM/DL
GLUCOSE SERPL-MCNC: 145 MG/DL (ref 65–99)
HBA1C MFR BLD: 6.7 % (ref 4.8–5.6)
HCT VFR BLD AUTO: 39.9 % (ref 34–46.6)
HDLC SERPL-MCNC: 37 MG/DL (ref 40–60)
HGB BLD-MCNC: 13.4 G/DL (ref 12–15.9)
IMM GRANULOCYTES # BLD AUTO: 0.02 10*3/MM3 (ref 0–0.05)
IMM GRANULOCYTES NFR BLD AUTO: 0.3 % (ref 0–0.5)
LDLC SERPL CALC-MCNC: 135 MG/DL (ref 0–100)
LDLC/HDLC SERPL: 3.59 {RATIO}
LYMPHOCYTES # BLD AUTO: 2.02 10*3/MM3 (ref 0.7–3.1)
LYMPHOCYTES NFR BLD AUTO: 33.7 % (ref 19.6–45.3)
MCH RBC QN AUTO: 30.7 PG (ref 26.6–33)
MCHC RBC AUTO-ENTMCNC: 33.6 G/DL (ref 31.5–35.7)
MCV RBC AUTO: 91.5 FL (ref 79–97)
MONOCYTES # BLD AUTO: 0.57 10*3/MM3 (ref 0.1–0.9)
MONOCYTES NFR BLD AUTO: 9.5 % (ref 5–12)
NEUTROPHILS NFR BLD AUTO: 3.16 10*3/MM3 (ref 1.7–7)
NEUTROPHILS NFR BLD AUTO: 52.6 % (ref 42.7–76)
PLATELET # BLD AUTO: 202 10*3/MM3 (ref 140–450)
PMV BLD AUTO: 13.3 FL (ref 6–12)
POTASSIUM SERPL-SCNC: 4.4 MMOL/L (ref 3.5–5.2)
PROT SERPL-MCNC: 7.2 G/DL (ref 6–8.5)
RBC # BLD AUTO: 4.36 10*6/MM3 (ref 3.77–5.28)
SODIUM SERPL-SCNC: 139 MMOL/L (ref 136–145)
TRIGL SERPL-MCNC: 141 MG/DL (ref 0–150)
TSH SERPL DL<=0.05 MIU/L-ACNC: 0.45 UIU/ML (ref 0.27–4.2)
VIT B12 BLD-MCNC: 260 PG/ML (ref 211–946)
VLDLC SERPL-MCNC: 26 MG/DL (ref 5–40)
WBC NRBC COR # BLD AUTO: 6 10*3/MM3 (ref 3.4–10.8)

## 2024-07-26 PROCEDURE — 80061 LIPID PANEL: CPT

## 2024-07-26 PROCEDURE — 82306 VITAMIN D 25 HYDROXY: CPT

## 2024-07-26 PROCEDURE — 82607 VITAMIN B-12: CPT

## 2024-07-26 PROCEDURE — 80053 COMPREHEN METABOLIC PANEL: CPT

## 2024-07-26 PROCEDURE — 83036 HEMOGLOBIN GLYCOSYLATED A1C: CPT

## 2024-07-26 PROCEDURE — 85025 COMPLETE CBC W/AUTO DIFF WBC: CPT

## 2024-07-26 PROCEDURE — 84443 ASSAY THYROID STIM HORMONE: CPT

## 2024-08-08 DIAGNOSIS — I10 ESSENTIAL HYPERTENSION: ICD-10-CM

## 2024-08-08 RX ORDER — AMLODIPINE BESYLATE 10 MG/1
10 TABLET ORAL DAILY
Qty: 90 TABLET | Refills: 0 | Status: SHIPPED | OUTPATIENT
Start: 2024-08-08

## 2024-09-11 RX ORDER — CARVEDILOL 25 MG/1
25 TABLET ORAL 2 TIMES DAILY WITH MEALS
Qty: 180 TABLET | Refills: 0 | Status: SHIPPED | OUTPATIENT
Start: 2024-09-11

## 2024-09-16 DIAGNOSIS — Z76.0 MEDICATION REFILL: ICD-10-CM

## 2024-09-17 RX ORDER — BACLOFEN 10 MG/1
10 TABLET ORAL 2 TIMES DAILY
Qty: 180 TABLET | Refills: 0 | Status: SHIPPED | OUTPATIENT
Start: 2024-09-17

## 2024-09-18 ENCOUNTER — OFFICE VISIT (OUTPATIENT)
Dept: ENDOCRINOLOGY | Facility: CLINIC | Age: 79
End: 2024-09-18
Payer: MEDICARE

## 2024-09-18 VITALS
OXYGEN SATURATION: 98 % | DIASTOLIC BLOOD PRESSURE: 80 MMHG | BODY MASS INDEX: 28.36 KG/M2 | HEART RATE: 68 BPM | HEIGHT: 65 IN | WEIGHT: 170.2 LBS | SYSTOLIC BLOOD PRESSURE: 130 MMHG | RESPIRATION RATE: 16 BRPM

## 2024-09-18 DIAGNOSIS — I10 ESSENTIAL HYPERTENSION: ICD-10-CM

## 2024-09-18 DIAGNOSIS — E03.9 HYPOTHYROIDISM, ADULT: ICD-10-CM

## 2024-09-18 DIAGNOSIS — E11.9 TYPE 2 DIABETES MELLITUS WITHOUT COMPLICATION, WITHOUT LONG-TERM CURRENT USE OF INSULIN: Primary | ICD-10-CM

## 2024-09-18 DIAGNOSIS — E04.2 NONTOXIC MULTINODULAR GOITER: ICD-10-CM

## 2024-09-18 DIAGNOSIS — E78.01 FAMILIAL HYPERCHOLESTEROLEMIA: ICD-10-CM

## 2024-09-18 LAB
EXPIRATION DATE: NORMAL
GLUCOSE BLDC GLUCOMTR-MCNC: 84 MG/DL (ref 70–130)
Lab: NORMAL
MICROALBUMIN/CREAT UR: 0 MG/G (ref 0–29)

## 2024-09-18 PROCEDURE — 3075F SYST BP GE 130 - 139MM HG: CPT | Performed by: INTERNAL MEDICINE

## 2024-09-18 PROCEDURE — 99214 OFFICE O/P EST MOD 30 MIN: CPT | Performed by: INTERNAL MEDICINE

## 2024-09-18 PROCEDURE — 1159F MED LIST DOCD IN RCRD: CPT | Performed by: INTERNAL MEDICINE

## 2024-09-18 PROCEDURE — 82947 ASSAY GLUCOSE BLOOD QUANT: CPT | Performed by: INTERNAL MEDICINE

## 2024-09-18 PROCEDURE — 1160F RVW MEDS BY RX/DR IN RCRD: CPT | Performed by: INTERNAL MEDICINE

## 2024-09-18 PROCEDURE — 3079F DIAST BP 80-89 MM HG: CPT | Performed by: INTERNAL MEDICINE

## 2024-10-01 RX ORDER — LEVOTHYROXINE SODIUM 125 UG/1
TABLET ORAL
Qty: 90 TABLET | Refills: 3 | Status: SHIPPED | OUTPATIENT
Start: 2024-10-01

## 2024-10-01 NOTE — TELEPHONE ENCOUNTER
Rx Refill Note  Requested Prescriptions     Pending Prescriptions Disp Refills    levothyroxine (SYNTHROID, LEVOTHROID) 125 MCG tablet [Pharmacy Med Name: Levothyroxine Sodium Oral Tablet 125 MCG] 90 tablet 1     Sig: TAKE 1 TABLET BY MOUTH EVERY DAY      Last office visit with prescribing clinician: 9/18/2024   Last telemedicine visit with prescribing clinician: Visit date not found   Next office visit with prescribing clinician: 3/26/2025                         Would you like a call back once the refill request has been completed: [] Yes [] No    If the office needs to give you a call back, can they leave a voicemail: [] Yes [] No    Norma Blackwood MA  10/01/24, 15:07 EDT

## 2024-10-03 RX ORDER — FUROSEMIDE 20 MG
20 TABLET ORAL 2 TIMES DAILY
Qty: 180 TABLET | Refills: 0 | Status: SHIPPED | OUTPATIENT
Start: 2024-10-03

## 2024-10-29 ENCOUNTER — HOSPITAL ENCOUNTER (OUTPATIENT)
Dept: BONE DENSITY | Facility: HOSPITAL | Age: 79
Discharge: HOME | End: 2024-10-29
Admitting: FAMILY MEDICINE
Payer: MEDICARE

## 2024-10-29 DIAGNOSIS — Z78.0 POSTMENOPAUSE: ICD-10-CM

## 2024-10-29 DIAGNOSIS — Z78.0 POST-MENOPAUSAL: ICD-10-CM

## 2024-10-29 PROCEDURE — 77080 DXA BONE DENSITY AXIAL: CPT

## 2024-11-03 DIAGNOSIS — I10 ESSENTIAL HYPERTENSION: ICD-10-CM

## 2024-11-04 RX ORDER — POTASSIUM CHLORIDE 750 MG/1
10 TABLET, EXTENDED RELEASE ORAL DAILY
Qty: 90 TABLET | Refills: 0 | Status: SHIPPED | OUTPATIENT
Start: 2024-11-04

## 2024-11-04 RX ORDER — AMLODIPINE BESYLATE 10 MG/1
10 TABLET ORAL DAILY
Qty: 90 TABLET | Refills: 0 | Status: SHIPPED | OUTPATIENT
Start: 2024-11-04

## 2024-11-14 DIAGNOSIS — E11.9 TYPE 2 DIABETES MELLITUS WITHOUT COMPLICATION, WITHOUT LONG-TERM CURRENT USE OF INSULIN: ICD-10-CM

## 2024-11-26 ENCOUNTER — TRANSCRIBE ORDERS (OUTPATIENT)
Dept: ADMINISTRATIVE | Facility: HOSPITAL | Age: 79
End: 2024-11-26
Payer: MEDICARE

## 2024-11-26 DIAGNOSIS — Z12.31 VISIT FOR SCREENING MAMMOGRAM: Primary | ICD-10-CM

## 2024-12-10 RX ORDER — CARVEDILOL 25 MG/1
25 TABLET ORAL 2 TIMES DAILY WITH MEALS
Qty: 180 TABLET | Refills: 0 | Status: SHIPPED | OUTPATIENT
Start: 2024-12-10

## 2024-12-16 RX ORDER — CLONIDINE 0.2 MG/24H
PATCH, EXTENDED RELEASE TRANSDERMAL
Qty: 12 PATCH | Refills: 3 | Status: SHIPPED | OUTPATIENT
Start: 2024-12-16

## 2024-12-19 DIAGNOSIS — Z76.0 MEDICATION REFILL: ICD-10-CM

## 2024-12-19 RX ORDER — BACLOFEN 10 MG/1
10 TABLET ORAL 2 TIMES DAILY
Qty: 180 TABLET | Refills: 0 | Status: SHIPPED | OUTPATIENT
Start: 2024-12-19

## 2024-12-30 RX ORDER — FUROSEMIDE 20 MG/1
20 TABLET ORAL 2 TIMES DAILY
Qty: 180 TABLET | Refills: 0 | Status: SHIPPED | OUTPATIENT
Start: 2024-12-30

## 2025-01-21 LAB
NCCN CRITERIA FLAG: NORMAL
TYRER CUZICK SCORE: 2.9

## 2025-01-23 ENCOUNTER — OFFICE VISIT (OUTPATIENT)
Dept: FAMILY MEDICINE CLINIC | Facility: CLINIC | Age: 80
End: 2025-01-23
Payer: MEDICARE

## 2025-01-23 ENCOUNTER — LAB (OUTPATIENT)
Dept: LAB | Facility: HOSPITAL | Age: 80
End: 2025-01-23
Payer: MEDICARE

## 2025-01-23 VITALS
SYSTOLIC BLOOD PRESSURE: 122 MMHG | DIASTOLIC BLOOD PRESSURE: 68 MMHG | WEIGHT: 173.6 LBS | TEMPERATURE: 98.7 F | BODY MASS INDEX: 28.89 KG/M2 | OXYGEN SATURATION: 99 % | HEART RATE: 63 BPM

## 2025-01-23 DIAGNOSIS — E11.9 TYPE 2 DIABETES MELLITUS WITHOUT COMPLICATION, WITHOUT LONG-TERM CURRENT USE OF INSULIN: Primary | ICD-10-CM

## 2025-01-23 DIAGNOSIS — I10 ESSENTIAL HYPERTENSION: ICD-10-CM

## 2025-01-23 DIAGNOSIS — M79.605 PAIN OF LEFT LOWER EXTREMITY: ICD-10-CM

## 2025-01-23 DIAGNOSIS — E55.9 VITAMIN D DEFICIENCY: ICD-10-CM

## 2025-01-23 DIAGNOSIS — E78.01 FAMILIAL HYPERCHOLESTEROLEMIA: ICD-10-CM

## 2025-01-23 DIAGNOSIS — M54.2 CERVICALGIA: ICD-10-CM

## 2025-01-23 DIAGNOSIS — M62.838 MUSCLE SPASM: ICD-10-CM

## 2025-01-23 LAB
25(OH)D3 SERPL-MCNC: 60.4 NG/ML (ref 30–100)
ALBUMIN SERPL-MCNC: 4.6 G/DL (ref 3.5–5.2)
ALBUMIN/GLOB SERPL: 1.5 G/DL
ALP SERPL-CCNC: 60 U/L (ref 39–117)
ALT SERPL W P-5'-P-CCNC: 29 U/L (ref 1–33)
ANION GAP SERPL CALCULATED.3IONS-SCNC: 15 MMOL/L (ref 5–15)
AST SERPL-CCNC: 27 U/L (ref 1–32)
BASOPHILS # BLD AUTO: 0.04 10*3/MM3 (ref 0–0.2)
BASOPHILS NFR BLD AUTO: 0.6 % (ref 0–1.5)
BILIRUB SERPL-MCNC: 0.4 MG/DL (ref 0–1.2)
BUN SERPL-MCNC: 15 MG/DL (ref 8–23)
BUN/CREAT SERPL: 17.4 (ref 7–25)
CALCIUM SPEC-SCNC: 10 MG/DL (ref 8.6–10.5)
CHLORIDE SERPL-SCNC: 102 MMOL/L (ref 98–107)
CHOLEST SERPL-MCNC: 237 MG/DL (ref 0–200)
CO2 SERPL-SCNC: 22 MMOL/L (ref 22–29)
CREAT SERPL-MCNC: 0.86 MG/DL (ref 0.57–1)
DEPRECATED RDW RBC AUTO: 41.8 FL (ref 37–54)
EGFRCR SERPLBLD CKD-EPI 2021: 69.7 ML/MIN/1.73
EOSINOPHIL # BLD AUTO: 0.18 10*3/MM3 (ref 0–0.4)
EOSINOPHIL NFR BLD AUTO: 2.5 % (ref 0.3–6.2)
ERYTHROCYTE [DISTWIDTH] IN BLOOD BY AUTOMATED COUNT: 12.8 % (ref 12.3–15.4)
EXPIRATION DATE: ABNORMAL
GLOBULIN UR ELPH-MCNC: 3 GM/DL
GLUCOSE SERPL-MCNC: 120 MG/DL (ref 65–99)
HBA1C MFR BLD: 6.5 % (ref 4.5–5.7)
HCT VFR BLD AUTO: 45.3 % (ref 34–46.6)
HDLC SERPL-MCNC: 40 MG/DL (ref 40–60)
HGB BLD-MCNC: 14.8 G/DL (ref 12–15.9)
IMM GRANULOCYTES # BLD AUTO: 0.02 10*3/MM3 (ref 0–0.05)
IMM GRANULOCYTES NFR BLD AUTO: 0.3 % (ref 0–0.5)
LDLC SERPL CALC-MCNC: 163 MG/DL (ref 0–100)
LDLC/HDLC SERPL: 4.01 {RATIO}
LYMPHOCYTES # BLD AUTO: 2.54 10*3/MM3 (ref 0.7–3.1)
LYMPHOCYTES NFR BLD AUTO: 35.1 % (ref 19.6–45.3)
Lab: ABNORMAL
MAGNESIUM SERPL-MCNC: 2 MG/DL (ref 1.6–2.4)
MCH RBC QN AUTO: 29.7 PG (ref 26.6–33)
MCHC RBC AUTO-ENTMCNC: 32.7 G/DL (ref 31.5–35.7)
MCV RBC AUTO: 90.8 FL (ref 79–97)
MONOCYTES # BLD AUTO: 0.71 10*3/MM3 (ref 0.1–0.9)
MONOCYTES NFR BLD AUTO: 9.8 % (ref 5–12)
NEUTROPHILS NFR BLD AUTO: 3.75 10*3/MM3 (ref 1.7–7)
NEUTROPHILS NFR BLD AUTO: 51.7 % (ref 42.7–76)
PLATELET # BLD AUTO: 221 10*3/MM3 (ref 140–450)
PMV BLD AUTO: 13.1 FL (ref 6–12)
POTASSIUM SERPL-SCNC: 4.4 MMOL/L (ref 3.5–5.2)
PROT SERPL-MCNC: 7.6 G/DL (ref 6–8.5)
RBC # BLD AUTO: 4.99 10*6/MM3 (ref 3.77–5.28)
SODIUM SERPL-SCNC: 139 MMOL/L (ref 136–145)
TRIGL SERPL-MCNC: 183 MG/DL (ref 0–150)
TSH SERPL DL<=0.05 MIU/L-ACNC: 1.68 UIU/ML (ref 0.27–4.2)
VIT B12 BLD-MCNC: 269 PG/ML (ref 211–946)
VLDLC SERPL-MCNC: 34 MG/DL (ref 5–40)
WBC NRBC COR # BLD AUTO: 7.24 10*3/MM3 (ref 3.4–10.8)

## 2025-01-23 PROCEDURE — 1126F AMNT PAIN NOTED NONE PRSNT: CPT | Performed by: FAMILY MEDICINE

## 2025-01-23 PROCEDURE — 1159F MED LIST DOCD IN RCRD: CPT | Performed by: FAMILY MEDICINE

## 2025-01-23 PROCEDURE — 80061 LIPID PANEL: CPT | Performed by: FAMILY MEDICINE

## 2025-01-23 PROCEDURE — 83036 HEMOGLOBIN GLYCOSYLATED A1C: CPT | Performed by: FAMILY MEDICINE

## 2025-01-23 PROCEDURE — 3044F HG A1C LEVEL LT 7.0%: CPT | Performed by: FAMILY MEDICINE

## 2025-01-23 PROCEDURE — 36415 COLL VENOUS BLD VENIPUNCTURE: CPT | Performed by: FAMILY MEDICINE

## 2025-01-23 PROCEDURE — 84443 ASSAY THYROID STIM HORMONE: CPT | Performed by: FAMILY MEDICINE

## 2025-01-23 PROCEDURE — 3074F SYST BP LT 130 MM HG: CPT | Performed by: FAMILY MEDICINE

## 2025-01-23 PROCEDURE — 82607 VITAMIN B-12: CPT | Performed by: FAMILY MEDICINE

## 2025-01-23 PROCEDURE — 1160F RVW MEDS BY RX/DR IN RCRD: CPT | Performed by: FAMILY MEDICINE

## 2025-01-23 PROCEDURE — 3078F DIAST BP <80 MM HG: CPT | Performed by: FAMILY MEDICINE

## 2025-01-23 PROCEDURE — 82306 VITAMIN D 25 HYDROXY: CPT | Performed by: FAMILY MEDICINE

## 2025-01-23 PROCEDURE — 99214 OFFICE O/P EST MOD 30 MIN: CPT | Performed by: FAMILY MEDICINE

## 2025-01-23 PROCEDURE — 85025 COMPLETE CBC W/AUTO DIFF WBC: CPT | Performed by: FAMILY MEDICINE

## 2025-01-23 PROCEDURE — 80053 COMPREHEN METABOLIC PANEL: CPT | Performed by: FAMILY MEDICINE

## 2025-01-23 PROCEDURE — 83735 ASSAY OF MAGNESIUM: CPT | Performed by: FAMILY MEDICINE

## 2025-01-23 NOTE — ASSESSMENT & PLAN NOTE
Lipid abnormalities are stable    Plan:  Continue same medication/s without change.      Discussed medication dosage, use, side effects, and goals of treatment in detail.    Counseled patient on lifestyle modifications to help control hyperlipidemia.   Statin intolerant     Patient Treatment Goals:   LDL goal is under 100    Followup in 6 months.

## 2025-01-23 NOTE — PROGRESS NOTES
Subjective     Chief Complaint  Follow-up (Has tingling in right shoulder that comes and goes. Her left leg has a tender sore spot that she is concerned about wants to make sure its not a blood clot.)    Subjective          Leila Pierre is a 79 y.o. female who presents today to [facility] for follow up.    HPI:   History of Present Illness    Ms. Pierre is a pleasant 79 year old female who presents today to follow up on chronic conditions.       Her past medical history includes hypertension, hyperlipidemia, type 2 diabetes, hypothyroidism, vitamin D deficiency obstructive sleep apnea.  She follows with endocrinology for treatment of her diabetes      For her hypertension she is currently prescribed candesartan 16 mg, amlodipine 10 mg daily, Coreg 25 mg twice daily, clonidine patch with great control her blood pressure in office today is 122/68       Type 2 DM - her HgA1c is 6.5% in office today. Only taking Metformin 1,000 mg BID.      She notes some tingling in the right shoulder for the last one month. She notes that sometimes she will have pain the neck when it happens and tension.     She has a sore spot on her left lower leg x 1 week. She has no swelling or erythema. She has not had a injury.     The following portions of the patient's history were reviewed and updated as appropriate: allergies, current medications, past family history, past medical history, past social history, past surgical history and problem list.    Objective     Objective     Allergy:   Allergies   Allergen Reactions    Adhesive Tape Unknown - Low Severity    Amoxicillin GI Bleeding    Clarithromycin     Decongest-Aid [Pseudoephedrine]      Gives too much energy and patient is unable to sleep    Irbesartan     Lescol [Fluvastatin Sodium] Diarrhea    Lipitor [Atorvastatin] Myalgia    Other      bandaids     Sulfa Antibiotics Unknown (See Comments) and Unknown - Low Severity    Valsartan      Sulfabenzamide Rash        Current Medications:   Current Outpatient Medications   Medication Sig Dispense Refill    amLODIPine (NORVASC) 10 MG tablet TAKE 1 TABLET BY MOUTH EVERY DAY 90 tablet 0    aspirin 81 MG EC tablet Take 1 tablet by mouth Daily.      baclofen (LIORESAL) 10 MG tablet TAKE 1 TABLET BY MOUTH 2 TIMES A  tablet 0    BIOTIN 5000 PO Take 1 tablet by mouth Daily.      Blood Glucose Monitoring Suppl (Accu-Chek Guide) w/Device kit 1 each See Admin Instructions. Dx E11.65 1 kit 0    Calcium Carbonate (CALCIUM 600 PO) Take 1 tablet by mouth Daily.      candesartan (ATACAND) 16 MG tablet Take 1 tablet by mouth Daily. 90 tablet 3    carvedilol (COREG) 25 MG tablet TAKE 1 TABLET BY MOUTH 2 TIMES A DAY WITH MEALS 180 tablet 0    cholecalciferol (VITAMIN D3) 1000 UNITS tablet Take 1 tablet by mouth Daily.      cloNIDine (CATAPRES-TTS) 0.2 MG/24HR patch APPLY 1 PATCH ON THE SKIN AS DIRECTED BY PROVIDER ONCE A WEEK 12 patch 3    famotidine (PEPCID) 20 MG tablet Take 1 tablet by mouth 2 (Two) Times a Day.      fexofenadine (ALLEGRA) 180 MG tablet Take 1 tablet by mouth Daily.      furosemide (LASIX) 20 MG tablet TAKE 1 TABLET BY MOUTH 2 TIMES A  tablet 0    glucose blood (Accu-Chek Guide) test strip USE AS INSTRUCTED TO TEST BLOOD GLUCOSE DAILY.  Dx E11.65 100 each 3    Lancets (accu-chek multiclix) lancets Use to check blood sugar daily.  Dx E11.65 102 each 3    levothyroxine (SYNTHROID, LEVOTHROID) 125 MCG tablet TAKE 1 TABLET BY MOUTH EVERY DAY 90 tablet 3    magnesium oxide (MAG-OX) 400 MG tablet Take 1 tablet by mouth Daily. 90 tablet 0    metFORMIN (GLUCOPHAGE) 500 MG tablet TAKE 2 TABLETS BY MOUTH 2 TIMES A DAY WITH MEALS 360 tablet 0    potassium chloride (KLOR-CON M10) 10 MEQ CR tablet TAKE 1 TABLET BY MOUTH EVERY DAY 90 tablet 0     No current facility-administered medications for this visit.       Past Medical History:  Past Medical History:   Diagnosis Date    Allergic     Arthritis      Diabetes mellitus     GERD (gastroesophageal reflux disease)     Hyperlipidemia     Hypertension     Hypothyroidism     Hypothyroidism     Melanoma     Melanoma in situ of right lower leg     1996    Non-toxic multinodular goiter     Overweight (BMI 25.0-29.9)     Thyroid disorder     Type 2 diabetes mellitus without complication        Past Surgical History:  Past Surgical History:   Procedure Laterality Date    BREAST EXCISIONAL BIOPSY Left     2002    BREAST SURGERY      biopsy benign    HYSTEROSCOPY ENDOMETRIAL ABLATION N/A     JOINT REPLACEMENT  12/08/2023    SKIN CANCER EXCISION      excision of melanoma-Abstracted from [place]    TUBAL ABDOMINAL LIGATION         Social History:  Social History     Socioeconomic History    Marital status:    Tobacco Use    Smoking status: Never     Passive exposure: Never    Smokeless tobacco: Never   Vaping Use    Vaping status: Never Used   Substance and Sexual Activity    Alcohol use: Yes     Alcohol/week: 1.0 - 2.0 standard drink of alcohol     Types: 1 - 2 Glasses of wine per week     Comment: occasional wine    Drug use: No    Sexual activity: Not Currently     Partners: Male     Birth control/protection: Tubal ligation       Family History:  Family History   Problem Relation Age of Onset    Cancer Mother     Ovarian cancer Mother 85    Hypertension Mother     Hypothyroidism Mother     Hypertension Father     Aneurysm Brother     Breast cancer Neg Hx        Vital Signs:   /68   Pulse 63   Temp 98.7 °F (37.1 °C) (Temporal)   Wt 78.7 kg (173 lb 9.6 oz)   SpO2 99%   BMI 28.89 kg/m²      Physical Exam:  Physical Exam  Vitals reviewed.   Constitutional:       Appearance: She is not ill-appearing.   Eyes:      Pupils: Pupils are equal, round, and reactive to light.   Cardiovascular:      Rate and Rhythm: Normal rate.      Pulses: Normal pulses.   Pulmonary:      Effort: Pulmonary effort is normal.      Breath sounds: Normal breath sounds.   Neurological:       General: No focal deficit present.      Mental Status: She is alert. Mental status is at baseline.               PHQ-9 Score  PHQ-9 Total Score:      Lab Review  Office Visit on 01/23/2025   Component Date Value Ref Range Status    Hemoglobin A1C 01/23/2025 6.5 (A)  4.5 - 5.7 % Final    Lot Number 01/23/2025 [id number]   Final    Expiration Date 01/23/2025 10/18/2026   Final   Orders Only on 01/21/2025   Component Date Value Ref Range Status    TyrerCuzick 01/21/2025 2.9   Final    NCCN 01/21/2025 NCCN not met   Final    High Risk Cancer Risk Assessment        Radiology Results        Assessment / Plan         Assessment and Plan   Diagnoses and all orders for this visit:    1. Type 2 diabetes mellitus without complication, without long-term current use of insulin (Primary)  -     POC Glycosylated Hemoglobin (Hb A1C)  -     CBC & Differential; Future  -     Comprehensive Metabolic Panel; Future  -     TSH Rfx On Abnormal To Free T4; Future  -     CBC & Differential  -     Comprehensive Metabolic Panel  -     TSH Rfx On Abnormal To Free T4    2. Vitamin D deficiency  -     Comprehensive Metabolic Panel; Future  -     Vitamin D,25-Hydroxy; Future  -     Comprehensive Metabolic Panel  -     Vitamin D,25-Hydroxy    3. Familial hypercholesterolemia  Assessment & Plan:   Lipid abnormalities are stable    Plan:  Continue same medication/s without change.      Discussed medication dosage, use, side effects, and goals of treatment in detail.    Counseled patient on lifestyle modifications to help control hyperlipidemia.   Statin intolerant     Patient Treatment Goals:   LDL goal is under 100    Followup in 6 months.    Orders:  -     Lipid Panel; Future  -     Lipid Panel    4. Essential hypertension  Assessment & Plan:  Hypertension is stable and controlled  Continue current treatment regimen.  Dietary sodium restriction.  Regular aerobic exercise.  Ambulatory blood pressure monitoring.  Blood pressure will be reassessed in  6 months.  Target blood pressure <140/90       5. Cervicalgia  -     Ambulatory Referral to Physical Therapy for Evaluation & Treatment    6. Muscle spasm  Assessment & Plan:  Referring to PT and ordering Mag/K with labs.     Orders:  -     Ambulatory Referral to Physical Therapy for Evaluation & Treatment  -     TSH Rfx On Abnormal To Free T4; Future  -     Vitamin B12; Future  -     Magnesium; Future  -     TSH Rfx On Abnormal To Free T4  -     Vitamin B12  -     Magnesium    7. Pain of left lower extremity  -     Duplex Venous Lower Extremity - Left CAR; Future        Discussed possible differential diagnoses, testing, treatment, recommended non-pharmacological interventions, risks, warning signs to monitor for that would indicate need for follow-up in clinic or ER. If no improvement with these regimens or you have new or worsening symptoms follow-up. Patient verbalizes understanding and agreement with plan of care. Denies further needs or concerns.     Patient was given instructions and counseling regarding her condition and for health maintenance advised.    BMI is >= 25 and <30. (Overweight) The following options were offered after discussion;: weight loss educational material (shared in after visit summary)       Health Maintenance  Health Maintenance:   Health Maintenance Due   Topic Date Due    DIABETIC FOOT EXAM  06/16/2021    COVID-19 Vaccine (4 - 2024-25 season) 09/01/2024        Meds ordered during this visit  No orders of the defined types were placed in this encounter.      Meds stopped during this visit:  There are no discontinued medications.     Visit Diagnoses    ICD-10-CM ICD-9-CM   1. Type 2 diabetes mellitus without complication, without long-term current use of insulin  E11.9 250.00   2. Vitamin D deficiency  E55.9 268.9   3. Familial hypercholesterolemia  E78.01 272.0   4. Essential hypertension  I10 401.9   5. Cervicalgia  M54.2 723.1   6. Muscle spasm  M62.838 728.85   7. Pain of left lower  extremity  M79.605 729.5       Patient was given instructions and counseling regarding her condition or for health maintenance advice. Please see specific information pulled into the AVS if appropriate.     Follow Up   Return in about 6 months (around 7/23/2025) for followup.          This document has been electronically signed by Mariam Miranda DO   January 23, 2025 11:58 EST    Dictated Utilizing Dragon Dictation: Part of this note may be an electronic transcription/translation of spoken language to printed text using the Dragon Dictation System.    Mariam Miranda D.O.  [facility] [place]

## 2025-01-23 NOTE — ASSESSMENT & PLAN NOTE
Hypertension is stable and controlled  Continue current treatment regimen.  Dietary sodium restriction.  Regular aerobic exercise.  Ambulatory blood pressure monitoring.  Blood pressure will be reassessed in 6 months.  Target blood pressure <140/90

## 2025-01-24 DIAGNOSIS — Z78.9 STATIN INTOLERANCE: ICD-10-CM

## 2025-01-24 DIAGNOSIS — E78.01 FAMILIAL HYPERCHOLESTEROLEMIA: Primary | ICD-10-CM

## 2025-01-27 RX ORDER — ROSUVASTATIN CALCIUM 10 MG/1
10 TABLET, COATED ORAL EVERY OTHER DAY
Qty: 45 TABLET | Refills: 0 | Status: SHIPPED | OUTPATIENT
Start: 2025-01-27

## 2025-01-28 ENCOUNTER — HOSPITAL ENCOUNTER (OUTPATIENT)
Dept: MAMMOGRAPHY | Facility: HOSPITAL | Age: 80
Discharge: HOME | End: 2025-01-28
Admitting: FAMILY MEDICINE
Payer: MEDICARE

## 2025-01-28 DIAGNOSIS — Z12.31 VISIT FOR SCREENING MAMMOGRAM: ICD-10-CM

## 2025-01-28 PROCEDURE — 77063 BREAST TOMOSYNTHESIS BI: CPT

## 2025-01-28 PROCEDURE — 77067 SCR MAMMO BI INCL CAD: CPT

## 2025-01-30 DIAGNOSIS — I10 ESSENTIAL HYPERTENSION: ICD-10-CM

## 2025-01-30 RX ORDER — POTASSIUM CHLORIDE 750 MG/1
10 TABLET, EXTENDED RELEASE ORAL DAILY
Qty: 90 TABLET | Refills: 0 | Status: SHIPPED | OUTPATIENT
Start: 2025-01-30

## 2025-01-31 DIAGNOSIS — I10 ESSENTIAL HYPERTENSION: ICD-10-CM

## 2025-01-31 RX ORDER — AMLODIPINE BESYLATE 10 MG/1
10 TABLET ORAL DAILY
Qty: 90 TABLET | Refills: 0 | Status: SHIPPED | OUTPATIENT
Start: 2025-01-31

## 2025-02-04 ENCOUNTER — HOSPITAL ENCOUNTER (OUTPATIENT)
Facility: HOSPITAL | Age: 80
Discharge: HOME | End: 2025-02-04
Admitting: FAMILY MEDICINE
Payer: MEDICARE

## 2025-02-04 DIAGNOSIS — M79.605 PAIN OF LEFT LOWER EXTREMITY: ICD-10-CM

## 2025-02-04 LAB

## 2025-02-04 PROCEDURE — 93971 EXTREMITY STUDY: CPT

## 2025-02-04 PROCEDURE — 93971 EXTREMITY STUDY: CPT | Performed by: INTERNAL MEDICINE

## 2025-02-09 DIAGNOSIS — E11.9 TYPE 2 DIABETES MELLITUS WITHOUT COMPLICATION, WITHOUT LONG-TERM CURRENT USE OF INSULIN: ICD-10-CM

## 2025-02-21 ENCOUNTER — TREATMENT (OUTPATIENT)
Dept: PHYSICAL THERAPY | Facility: CLINIC | Age: 80
End: 2025-02-21
Payer: MEDICARE

## 2025-02-21 DIAGNOSIS — R20.0 NUMBNESS AND TINGLING OF RIGHT ARM: ICD-10-CM

## 2025-02-21 DIAGNOSIS — R20.2 NUMBNESS AND TINGLING OF RIGHT ARM: ICD-10-CM

## 2025-02-21 DIAGNOSIS — M54.2 CERVICALGIA: Primary | ICD-10-CM

## 2025-02-21 NOTE — PROGRESS NOTES
Physical Therapy Initial Evaluation and Plan of Care    [facility]  [address]  [place] [id number] [phone]    Patient: Leila Pierre   : 1945  Diagnosis/ICD-10 Code:  No primary diagnosis found.  Referring practitioner: Mariam Miranda DO    Subjective Evaluation    History of Present Illness  Date of onset: 2024  Mechanism of injury: Chronic    Subjective comment: Has had chronic right sided neck pain and stiffness.  With concomitant tingling along the right shoulder that radiates up to the right lateral cervical spine region.  Notes some diffuse right hand numbness at night time on occasion.  No headaches.  Neck has a catching sensation along the right side that lasts up to 1 minute that is infrequent in nature.  Has tingling in the right shoulder to lateral neck that occurs 8-10x/day that can last 2-3 minutes. (Has longer history of pain in the thoroacic spine region that she uses Baclofen to ease.  Pain in the thoracic spine is increased with prolonged standing that includes cleaning dishes, dusting and cooking.)  Patient Occupation: Retired Quality of life: excellent    Pain  Exacerbated by: Waking up on occasion with neck pain; riding in car.    Social Support  Lives in: condominium  Lives with: spouse    Hand dominance: right    Patient Goals  Patient goals for therapy: decreased pain and increased motion  Patient goal: Get rid of tingling.         *NDI:  10%    Objective          Neurological Testing     Reflexes   Left   Biceps (C5/C6): normal (2+)  Brachioradialis (C6): normal (2+)  Triceps (C7): normal (2+)    Right   Biceps (C5/C6): normal (2+)  Brachioradialis (C6): normal (2+)  Triceps (C7): normal (2+)    Active Range of Motion   Cervical/Thoracic Spine   Cervical    Flexion: 40 degrees   Extension: 10 degrees   Left rotation: 50 degrees   Right rotation: 40 degrees     Strength/Myotome Testing   Cervical Spine     Left    Normal strength    Right   Normal strength    Tests   Cervical     Right   Positive Spurling's sign.           Assessment & Plan       Assessment  Impairments: abnormal or restricted ROM and lacks appropriate home exercise program   Functional limitations: sleeping and unable to perform repetitive tasks   Assessment details: Mrs. Pierre is a very pleasant 79 year old RHD female that presents to physical therapy with chronic pain in the cervical spine with tingling along the right shoulder region.  Symptoms started insidiously 2 months ago.  PMH was covered and reviewed during interview.  Neurological exam is unremarkable.  Cervical spine mobility is limited in all planes, notably into extension.  Has reproduction of tingling along the right cervical spine to right shoulder with sustained right cervical spine rotation and extension quadrants.  Will focus care on improving cervical spine stiffness, cervical spine strength and posterior shoulder strength.  This should ease tingling while improving cervical spine pain and mobility.    Prognosis: good    Goals  Plan Goals: STGs:  1.)  Have no tingling in the neck and right shoulder in 4 weeks.  2.)  Have at least 60 deg of active cervical spine rotation bilaterally in 6 weeks.  LTGs:  1.)  Have at last 30 deg of cervical spine extension active mobility in sitting in 8 weeks.  2.)  Have no sleep disturbance from cervical spine pain in 12 weeks.    Plan  Therapy options: will be seen for skilled therapy services  Planned modality interventions: thermotherapy (hydrocollator packs), high voltage pulsed current (pain management) and dry needling  Planned therapy interventions: therapeutic activities, stretching, strengthening, manual therapy, abdominal trunk stabilization, functional ROM exercises and home exercise program  Frequency: 2x month  Duration in visits: 6  Duration in weeks: 12  Treatment plan discussed with: patient        Manual Therapy:    15     mins   67262;  Therapeutic Exercise:    15     mins  15037;     Neuromuscular Anayeli:        mins  91295;    Therapeutic Activity:     9     mins  32864;     Gait Training:           mins  51389;     Ultrasound:          mins  70588;    Electrical Stimulation:        mins  81874 ( );  Dry Needling         mins self-pay    Timed Treatment:   39   mins   Total Treatment:     50   mins    PT SIGNATURE: Uziel Levy, PT   DATE TREATMENT INITIATED: 2/21/2025    Initial Certification  Certification Period: 5/22/2025  I certify that the therapy services are furnished while this patient is under my care.  The services outlined above are required by this patient, and will be reviewed every 90 days.     PHYSICIAN: Mariam Miranda DO  NPI: [id number]                                      DATE:    Please sign and return via fax to [phone].. Thank you, [facility].

## 2025-03-05 ENCOUNTER — TREATMENT (OUTPATIENT)
Dept: PHYSICAL THERAPY | Facility: CLINIC | Age: 80
End: 2025-03-05
Payer: MEDICARE

## 2025-03-05 DIAGNOSIS — R20.2 NUMBNESS AND TINGLING OF RIGHT ARM: ICD-10-CM

## 2025-03-05 DIAGNOSIS — R20.0 NUMBNESS AND TINGLING OF RIGHT ARM: ICD-10-CM

## 2025-03-05 DIAGNOSIS — M54.2 CERVICALGIA: Primary | ICD-10-CM

## 2025-03-05 NOTE — PROGRESS NOTES
Physical Therapy Daily Progress Note    Patient: Leila Pierre   : 1945  Diagnosis/ICD-10 Code:  Cervicalgia [M54.2]  Referring practitioner: Mariam Miranda DO  Date of Initial Visit: Type: THERAPY  Noted: 2025  Today's Date: 3/5/2025  Patient seen for 2 sessions         Leila Pierre reports feels less frequency of tingling since last visit.      Subjective     Objective   See Exercise, Manual, and Modality Logs for complete treatment.       Assessment/Plan  Focus visit on reducing cervical spine stiffness via manual therapy combined with exercise to improve cervical spine strength and scapular muscle endurance.  Will follow-up in 2 weeks for reassessment.           Manual Therapy:    15     mins  83267;  Therapeutic Exercise:    15     mins  80395;     Neuromuscular Anayeli:        mins  03619;    Therapeutic Activity:          mins  88708;     Gait Training:           mins  89597;     Ultrasound:          mins  67809;    Electrical Stimulation:         mins  97538 (MC );  Dry Needling          mins self-pay    Timed Treatment:   30   mins   Total Treatment:     30   mins    Uziel Levy PT  Physical Therapist

## 2025-03-08 RX ORDER — CARVEDILOL 25 MG/1
25 TABLET ORAL 2 TIMES DAILY WITH MEALS
Qty: 180 TABLET | Refills: 0 | Status: SHIPPED | OUTPATIENT
Start: 2025-03-08

## 2025-03-19 ENCOUNTER — TREATMENT (OUTPATIENT)
Dept: PHYSICAL THERAPY | Facility: CLINIC | Age: 80
End: 2025-03-19
Payer: MEDICARE

## 2025-03-19 DIAGNOSIS — R20.0 NUMBNESS AND TINGLING OF RIGHT ARM: ICD-10-CM

## 2025-03-19 DIAGNOSIS — M54.2 CERVICALGIA: Primary | ICD-10-CM

## 2025-03-19 DIAGNOSIS — R20.2 NUMBNESS AND TINGLING OF RIGHT ARM: ICD-10-CM

## 2025-03-19 NOTE — PROGRESS NOTES
Physical Therapy Daily Progress Note    Patient: Leila Pierre   : 1945  Diagnosis/ICD-10 Code:  Cervicalgia [M54.2]  Referring practitioner: Mariam Miranda DO  Date of Initial Visit: Type: THERAPY  Noted: 2025  Today's Date: 3/19/2025  Patient seen for 3 sessions         Leila Pierre reports that her neck is feeling better in terms of less intensity and severity of tingling in the right lateral cervical spine region.  Also, the tingling and radiating soreness does not go as far, stopping just above the right shoulder.  Still has neck stiffness.      Subjective     Objective   See Exercise, Manual, and Modality Logs for complete treatment.     *[facility] MMT:  4+/5 la    Assessment/Plan  Mrs. Pierre has attended physical therapy for total of 3 visits for chronic pain and stiffness in the cervical spine region with concomitant right lateral cervical spine and shoulder radiculitis.  Symptoms of radiculitis have eased in terms of severity and frequency.  Combined with centralization of radiculitis.  Has improved bilateral shoulder strength.  Will follow-up in 2 weeks to reassess cervical spine mobility.  Plan discharge at that time.       Manual Therapy:    15     mins  59204;  Therapeutic Exercise:    15     mins  57298;     Neuromuscular Anayeli:        mins  80834;    Therapeutic Activity:     8     mins  00570;     Gait Training:           mins  50243;     Ultrasound:          mins  34217;    Electrical Stimulation:         mins  19834 ( );  Dry Needling          mins self-pay    Timed Treatment:   38   mins   Total Treatment:     38   mins    Uziel Levy, PT  Physical Therapist

## 2025-03-23 DIAGNOSIS — Z76.0 MEDICATION REFILL: ICD-10-CM

## 2025-03-24 RX ORDER — BACLOFEN 10 MG/1
10 TABLET ORAL 2 TIMES DAILY
Qty: 180 TABLET | Refills: 0 | Status: SHIPPED | OUTPATIENT
Start: 2025-03-24

## 2025-03-26 ENCOUNTER — OFFICE VISIT (OUTPATIENT)
Dept: ENDOCRINOLOGY | Facility: CLINIC | Age: 80
End: 2025-03-26
Payer: MEDICARE

## 2025-03-26 VITALS
WEIGHT: 170.4 LBS | BODY MASS INDEX: 28.39 KG/M2 | DIASTOLIC BLOOD PRESSURE: 70 MMHG | SYSTOLIC BLOOD PRESSURE: 110 MMHG | OXYGEN SATURATION: 96 % | HEIGHT: 65 IN | HEART RATE: 60 BPM

## 2025-03-26 DIAGNOSIS — I10 ESSENTIAL HYPERTENSION: ICD-10-CM

## 2025-03-26 DIAGNOSIS — E04.2 NONTOXIC MULTINODULAR GOITER: ICD-10-CM

## 2025-03-26 DIAGNOSIS — E03.9 HYPOTHYROIDISM, ADULT: ICD-10-CM

## 2025-03-26 DIAGNOSIS — E11.9 TYPE 2 DIABETES MELLITUS WITHOUT COMPLICATION, WITHOUT LONG-TERM CURRENT USE OF INSULIN: Primary | ICD-10-CM

## 2025-03-26 DIAGNOSIS — E78.01 FAMILIAL HYPERCHOLESTEROLEMIA: ICD-10-CM

## 2025-03-26 LAB
EXPIRATION DATE: NORMAL
GLUCOSE BLDC GLUCOMTR-MCNC: 128 MG/DL (ref 70–130)
Lab: NORMAL

## 2025-03-26 RX ORDER — LANCETS
1 EACH MISCELLANEOUS DAILY
Qty: 100 EACH | Refills: 3 | Status: SHIPPED | OUTPATIENT
Start: 2025-03-26 | End: 2025-03-27 | Stop reason: SDUPTHER

## 2025-03-26 NOTE — ASSESSMENT & PLAN NOTE
Her PCP check lipids recently.  LDL was above goal.  Looks like she has been prescribed rosuvastatin.  She said it made her glucose go up and she stopped it.

## 2025-03-26 NOTE — PROGRESS NOTES
"     Office Note      Date: 2025  Patient Name: Leila Pierre  MRN: [id number]  : 1945    Chief Complaint   Patient presents with    Diabetes     Type II    Hypothyroidism    Goiter     Nontoxic Multinodular    Hyperlipidemia     Familial    Hypertension     Essential       History of Present Illness:   Leila Pierre is a 79 y.o. female who presents for Diabetes type 2. Diagnosed in: . Treated in past with oral agents. Current treatments: metformin. Number of insulin shots per day: none. Checks blood sugar 1 time a day. Has low blood sugar: no. Aspirin use: Yes. Statin use: No - intolerant. ACE-I/ARB use: Yes. Changes in health since last visit: none. Last eye exam 2024.     She remains on T4 125mcg qd. She is taking this correctly. She isn't taking any interfering meds concurrently. She denies any sxs of hypo- or hyperthyroidism at this time. She hasn't noted any change in the size of her neck. She denies any compressive sxs.    Subjective      Diabetic Complications:  Eyes: No  Kidneys: No  Feet: No  Heart: No    Diet and Exercise:  Meals per day: 3  Minutes of exercise per week: 0 mins.    Review of Systems:   Review of Systems   Constitutional: Negative.    Cardiovascular: Negative.    Gastrointestinal: Negative.    Endocrine: Negative.        The following portions of the patient's history were reviewed and updated as appropriate: allergies, current medications, past family history, past medical history, past social history, past surgical history, and problem list.    Objective     Visit Vitals  /70 (BP Location: Right arm, Patient Position: Sitting, Cuff Size: Adult)   Pulse 60   Ht 165.1 cm (65\")   Wt 77.3 kg (170 lb 6.4 oz)   LMP  (LMP Unknown)   SpO2 96%   BMI 28.36 kg/m²       Physical Exam:  Physical Exam  Constitutional:       Appearance: Normal appearance.   Neck:      Thyroid: No thyroid mass, thyromegaly or thyroid tenderness.   Lymphadenopathy:      Cervical: No " "cervical adenopathy.   Neurological:      Mental Status: She is alert.         Labs:    HbA1c  Lab Results   Component Value Date    HGBA1C 6.5 (A) 01/23/2025       CMP  Lab Results   Component Value Date    GLUCOSE 120 (H) 01/23/2025    BUN 15 01/23/2025    CREATININE 0.86 01/23/2025    EGFRIFNONA 66 02/24/2022    EGFRIFAFRI 77 02/26/2015    BCR 17.4 01/23/2025    K 4.4 01/23/2025    CO2 22.0 01/23/2025    CALCIUM 10.0 01/23/2025    AST 27 01/23/2025    ALT 29 01/23/2025        Lipid Panel  Lab Results   Component Value Date    HDL Cholesterol 40 01/23/2025    LDL Cholesterol  163 (H) 01/23/2025    LDL/HDL Ratio 4.01 01/23/2025    Triglycerides 183 (H) 01/23/2025        TSH  Lab Results   Component Value Date    TSH 1.680 01/23/2025        Hemoglobin A1C  No components found for: \"HGBA1C\"     Microalbumin/Creatinine  Lab Results   Component Value Date    MALBCRERATIO 0.0 06/17/2024    MICROALBUR <1.2 05/04/2023           Assessment / Plan      Assessment & Plan:  Diagnoses and all orders for this visit:    1. Type 2 diabetes mellitus without complication, without long-term current use of insulin (Primary)  Assessment & Plan:  Diabetes is stable.   Continue current treatment regimen.  Diabetes will be reassessed in 6 months.    Orders:  -     POC Glucose, Blood    2. Essential hypertension  Assessment & Plan:  Hypertension is stable and controlled.  Continue current treatment regimen.  Blood pressure will be reassessed in 6 months.      3. Familial hypercholesterolemia  Assessment & Plan:  Her PCP check lipids recently.  LDL was above goal.  Looks like she has been prescribed rosuvastatin.  She said it made her glucose go up and she stopped it.      4. Hypothyroidism, adult  Assessment & Plan:  Continue levothyroxine.  Recent TSH at goal.      5. Nontoxic multinodular goiter  Assessment & Plan:  Plan for another u/s in about 18 months.      Other orders  -     Accu-Chek FastClix Lancets misc; Use 1 each Daily.  " Dispense: 100 each; Refill: 3      Current Outpatient Medications   Medication Instructions    Accu-Chek FastClix Lancets misc 1 each, Not Applicable, Daily    amLODIPine (NORVASC) 10 mg, Oral, Daily    aspirin 81 mg, Daily    baclofen (LIORESAL) 10 mg, Oral, 2 Times Daily    BIOTIN 5000 PO 1 tablet, Daily    Blood Glucose Monitoring Suppl (Accu-Chek Guide) w/Device kit 1 each, Not Applicable, See Admin Instructions, Dx E11.65    Calcium Carbonate (CALCIUM 600 PO) 1 tablet, Daily    candesartan (ATACAND) 16 mg, Oral, Daily    carvedilol (COREG) 25 mg, Oral, 2 Times Daily With Meals    cholecalciferol (VITAMIN D3) 1,000 Units, Daily    cloNIDine (CATAPRES-TTS) 0.2 MG/24HR patch APPLY 1 PATCH ON THE SKIN AS DIRECTED BY PROVIDER ONCE A WEEK    famotidine (PEPCID) 20 mg, 2 Times Daily    fexofenadine (ALLEGRA) 180 mg, Daily    furosemide (LASIX) 20 mg, Oral, 2 Times Daily    glucose blood (Accu-Chek Guide) test strip USE AS INSTRUCTED TO TEST BLOOD GLUCOSE DAILY.  Dx E11.65    levothyroxine (SYNTHROID, LEVOTHROID) 125 MCG tablet TAKE 1 TABLET BY MOUTH EVERY DAY    magnesium oxide (MAG-OX) 400 mg, Oral, Daily    metFORMIN (GLUCOPHAGE) 1,000 mg, Oral, 2 Times Daily With Meals    potassium chloride (KLOR-CON M10) 10 MEQ CR tablet 10 mEq, Oral, Daily      Return in about 6 months (around 9/26/2025) for Recheck with A1c, CMP, lipid, TSH, microalbumin, foot exam.    Electronically signed by: Ramírez Calabrese MD  03/26/2025

## 2025-03-27 RX ORDER — LANCETS
1 EACH MISCELLANEOUS DAILY
Qty: 100 EACH | Refills: 3 | Status: SHIPPED | OUTPATIENT
Start: 2025-03-27

## 2025-03-27 NOTE — TELEPHONE ENCOUNTER
PHARMACY NEEDS DIAGNOSIS WRITTEN ON THE PRESCRIPTION FOR ACCU CHEK LANCETS PLEASE RESEND PRESCRIPTION WITH CODE ON IT

## 2025-03-27 NOTE — TELEPHONE ENCOUNTER
[facility] called, stating they are needing a new script with the dx code for accu check lancets.

## 2025-03-28 RX ORDER — FUROSEMIDE 20 MG/1
20 TABLET ORAL 2 TIMES DAILY
Qty: 180 TABLET | Refills: 0 | Status: SHIPPED | OUTPATIENT
Start: 2025-03-28

## 2025-04-07 ENCOUNTER — TREATMENT (OUTPATIENT)
Dept: PHYSICAL THERAPY | Facility: CLINIC | Age: 80
End: 2025-04-07
Payer: MEDICARE

## 2025-04-07 DIAGNOSIS — R20.0 NUMBNESS AND TINGLING OF RIGHT ARM: ICD-10-CM

## 2025-04-07 DIAGNOSIS — R20.2 NUMBNESS AND TINGLING OF RIGHT ARM: ICD-10-CM

## 2025-04-07 DIAGNOSIS — M54.2 CERVICALGIA: Primary | ICD-10-CM

## 2025-04-07 PROCEDURE — 97110 THERAPEUTIC EXERCISES: CPT | Performed by: PHYSICAL THERAPIST

## 2025-04-07 PROCEDURE — 97140 MANUAL THERAPY 1/> REGIONS: CPT | Performed by: PHYSICAL THERAPIST

## 2025-04-08 NOTE — PROGRESS NOTES
Physical Therapy Daily Progress Note    Patient: Leila Pierre   : 1945  Diagnosis/ICD-10 Code:  Cervicalgia [M54.2]  Referring practitioner: Mariam Miranda DO  Date of Initial Visit: Type: THERAPY  Noted: 2025  Today's Date: 2025  Patient seen for 4 sessions         Leila Pierre reports some improvement in tingling along the right lateral cervical spine.  Notes less frequency of tingling.  Tingling does not travel as far as it did.  Although, severity of tingling remains unchanged.        Subjective     Objective   See Exercise, Manual, and Modality Logs for complete treatment.     *C/S AROM R rot/L rot/ext/flx:  46 deg/47 deg/20 deg/38 deg    Assessment/Plan  Mrs. Pierre has attended physical therapy for a total of 4 visits for chronic stiffness in the cervical spine with paresthesias affecting the right posterior cervical spine region.  Has not made changes in cervical spine mobility.  Although, paresthesias have improved in terms of frequency.  Also, paresthesias have become more centralized.  Will f/u in 2 weeks.         Manual Therapy:    9     mins  89046;  Therapeutic Exercise:    14     mins  02338;     Neuromuscular Anayeli:        mins  93182;    Therapeutic Activity:          mins  70833;     Gait Training:           mins  41231;     Ultrasound:         mins  74765;    Electrical Stimulation:         mins  78425 ( );  Dry Needling          mins self-pay    Timed Treatment:   23   mins   Total Treatment:     23   mins    Uziel Levy, PT  Physical Therapist

## 2025-04-27 DIAGNOSIS — I10 ESSENTIAL HYPERTENSION: ICD-10-CM

## 2025-04-28 RX ORDER — POTASSIUM CHLORIDE 750 MG/1
10 TABLET, EXTENDED RELEASE ORAL DAILY
Qty: 90 TABLET | Refills: 0 | Status: SHIPPED | OUTPATIENT
Start: 2025-04-28

## 2025-04-29 ENCOUNTER — TREATMENT (OUTPATIENT)
Dept: PHYSICAL THERAPY | Facility: CLINIC | Age: 80
End: 2025-04-29
Payer: MEDICARE

## 2025-04-29 DIAGNOSIS — M54.2 CERVICALGIA: Primary | ICD-10-CM

## 2025-04-29 DIAGNOSIS — R20.0 NUMBNESS AND TINGLING OF RIGHT ARM: ICD-10-CM

## 2025-04-29 DIAGNOSIS — R20.2 NUMBNESS AND TINGLING OF RIGHT ARM: ICD-10-CM

## 2025-04-30 DIAGNOSIS — I10 ESSENTIAL HYPERTENSION: ICD-10-CM

## 2025-04-30 RX ORDER — AMLODIPINE BESYLATE 10 MG/1
10 TABLET ORAL DAILY
Qty: 90 TABLET | Refills: 0 | Status: SHIPPED | OUTPATIENT
Start: 2025-04-30

## 2025-04-30 NOTE — PROGRESS NOTES
Physical Therapy Daily Progress Note    Patient: Leila Pierre   : 1945  Diagnosis/ICD-10 Code:  Cervicalgia [M54.2]  Referring practitioner: Mariam Miranda DO  Date of Initial Visit: Type: THERAPY  Noted: 2025  Today's Date: 2025  Patient seen for 5 sessions         Leila Pierre reports that her neck is feeling better.  Reduction in tingling to 2 episodes per day in the right lateral neck.      Subjective     Objective   See Exercise, Manual, and Modality Logs for complete treatment.       Assessment/Plan  Focused visit on improving cervical spine stiffness, lower primarily.  Combined with improving loading of the cervical spine and posterior shoulders.  Will plan to discharge today.  But, will be happy to see Mrs. Pierre back should any symptoms worsen.           Manual Therapy:    15     mins  94999;  Therapeutic Exercise:    9     mins  85510;     Neuromuscular Anayeli:        mins  15305;    Therapeutic Activity:          mins  39236;     Gait Training:           mins  02179;     Ultrasound:          mins  48026;    Electrical Stimulation:         mins  37521 ( );  Dry Needling         mins self-pay    Timed Treatment:   24   mins   Total Treatment:     24   mins    Uziel Levy PT  Physical Therapist

## 2025-05-08 ENCOUNTER — OFFICE VISIT (OUTPATIENT)
Dept: FAMILY MEDICINE CLINIC | Facility: CLINIC | Age: 80
End: 2025-05-08
Payer: MEDICARE

## 2025-05-08 VITALS
HEART RATE: 84 BPM | TEMPERATURE: 98.4 F | BODY MASS INDEX: 28.82 KG/M2 | SYSTOLIC BLOOD PRESSURE: 136 MMHG | HEIGHT: 65 IN | WEIGHT: 173 LBS | DIASTOLIC BLOOD PRESSURE: 76 MMHG

## 2025-05-08 DIAGNOSIS — K42.9 UMBILICAL HERNIA WITHOUT OBSTRUCTION AND WITHOUT GANGRENE: Primary | ICD-10-CM

## 2025-05-08 RX ORDER — FINASTERIDE 5 MG/1
TABLET, FILM COATED ORAL
COMMUNITY
Start: 2025-05-06

## 2025-05-08 NOTE — PROGRESS NOTES
Subjective     Chief Complaint  Hernia    Subjective          Leila Pierre is a 80 y.o. female who presents today to [facility] for initial evaluation .    HPI:   History of Present Illness    History of Present Illness  The patient is a 80-year-old female who presents for evaluation of an abdominal hernia.    She reports intermittent pain associated with her abdominal hernia, which has been present for several years. The pain is not constant but occurs sporadically. She also notes a protrusion at her belly button, which becomes more pronounced during episodes of bloating. Prolonged standing, such as when preparing a large meal, can also exacerbate the discomfort. She does not experience any issues with constipation or diarrhea. She has no history of abdominal surgeries, including gallbladder surgery. She occasionally experiences twinges of pain related to the hernia. She recalls a previous consultation with Dr. Ball, who reassured her that the condition was not a cause for concern. However, she has recently started experiencing pain, which was not a symptom in the past.      The following portions of the patient's history were reviewed and updated as appropriate: allergies, current medications, past family history, past medical history, past social history, past surgical history and problem list.    Objective     Objective     Allergy:   Allergies   Allergen Reactions    Adhesive Tape Unknown - Low Severity    Amoxicillin GI Bleeding    Clarithromycin     Decongest-Aid [Pseudoephedrine]      Gives too much energy and patient is unable to sleep    Irbesartan     Lescol [Fluvastatin Sodium] Diarrhea    Lipitor [Atorvastatin] Myalgia    Other      bandaids     Sulfa Antibiotics Unknown (See Comments) and Unknown - Low Severity    Valsartan     Sulfabenzamide Rash        Current Medications:   Current Outpatient Medications   Medication Sig Dispense Refill    Accu-Chek  FastClix Lancets misc Use 1 each Daily. E11.65 100 each 3    amLODIPine (NORVASC) 10 MG tablet TAKE 1 TABLET BY MOUTH EVERY DAY 90 tablet 0    aspirin 81 MG EC tablet Take 1 tablet by mouth Daily.      baclofen (LIORESAL) 10 MG tablet TAKE 1 TABLET BY MOUTH 2 TIMES A  tablet 0    BIOTIN 5000 PO Take 1 tablet by mouth Daily.      Blood Glucose Monitoring Suppl (Accu-Chek Guide) w/Device kit 1 each See Admin Instructions. Dx E11.65 1 kit 0    Calcium Carbonate (CALCIUM 600 PO) Take 1 tablet by mouth Daily.      candesartan (ATACAND) 16 MG tablet Take 1 tablet by mouth Daily. 90 tablet 3    carvedilol (COREG) 25 MG tablet TAKE 1 TABLET BY MOUTH 2 TIMES A DAY WITH MEALS 180 tablet 0    cholecalciferol (VITAMIN D3) 1000 UNITS tablet Take 1 tablet by mouth Daily.      cloNIDine (CATAPRES-TTS) 0.2 MG/24HR patch APPLY 1 PATCH ON THE SKIN AS DIRECTED BY PROVIDER ONCE A WEEK 12 patch 3    famotidine (PEPCID) 20 MG tablet Take 1 tablet by mouth 2 (Two) Times a Day.      fexofenadine (ALLEGRA) 180 MG tablet Take 1 tablet by mouth Daily.      finasteride (PROSCAR) 5 MG tablet       furosemide (LASIX) 20 MG tablet TAKE 1 TABLET BY MOUTH 2 TIMES A  tablet 0    glucose blood (Accu-Chek Guide) test strip USE AS INSTRUCTED TO TEST BLOOD GLUCOSE DAILY.  Dx E11.65 100 each 3    levothyroxine (SYNTHROID, LEVOTHROID) 125 MCG tablet TAKE 1 TABLET BY MOUTH EVERY DAY 90 tablet 3    magnesium oxide (MAG-OX) 400 MG tablet Take 1 tablet by mouth Daily. 90 tablet 0    metFORMIN (GLUCOPHAGE) 500 MG tablet TAKE 2 TABLETS BY MOUTH 2 TIMES A DAY WITH MEALS 360 tablet 0    potassium chloride (KLOR-CON M10) 10 MEQ CR tablet TAKE 1 TABLET BY MOUTH EVERY DAY 90 tablet 0     No current facility-administered medications for this visit.       Past Medical History:  Past Medical History:   Diagnosis Date    Allergic     Arthritis     Diabetes mellitus     GERD (gastroesophageal reflux disease)     Hyperlipidemia     Hypertension      "Hypothyroidism     Hypothyroidism     Melanoma     Melanoma in situ of right lower leg     1996    Non-toxic multinodular goiter     Osteopenia     Overweight (BMI 25.0-29.9)     Thyroid disorder     Type 2 diabetes mellitus without complication        Past Surgical History:  Past Surgical History:   Procedure Laterality Date    BREAST EXCISIONAL BIOPSY Left     2002    BREAST SURGERY      biopsy benign    HYSTEROSCOPY ENDOMETRIAL ABLATION N/A     JOINT REPLACEMENT  12/08/2023    SKIN CANCER EXCISION      excision of melanoma-Abstracted from [place]    TUBAL ABDOMINAL LIGATION         Social History:  Social History     Socioeconomic History    Marital status:    Tobacco Use    Smoking status: Never     Passive exposure: Never    Smokeless tobacco: Never    Tobacco comments:     L   Vaping Use    Vaping status: Never Used   Substance and Sexual Activity    Alcohol use: Yes     Alcohol/week: 1.0 - 2.0 standard drink of alcohol     Types: 1 - 2 Glasses of wine per week     Comment: occasional wine    Drug use: No    Sexual activity: Not Currently     Partners: Male     Birth control/protection: Tubal ligation       Family History:  Family History   Problem Relation Age of Onset    Cancer Mother     Ovarian cancer Mother 85    Hypertension Mother     Hypothyroidism Mother     Hypertension Father     Aneurysm Brother     Breast cancer Neg Hx          Vital Signs:   /76   Pulse 84   Temp 98.4 °F (36.9 °C) (Infrared)   Ht 165.1 cm (65\")   Wt 78.5 kg (173 lb)   BMI 28.79 kg/m²      Physical Exam:  Physical Exam  Vitals reviewed.   Constitutional:       Appearance: She is not ill-appearing.   Cardiovascular:      Rate and Rhythm: Normal rate.      Pulses: Normal pulses.   Pulmonary:      Effort: Pulmonary effort is normal.      Breath sounds: Normal breath sounds.   Abdominal:      Hernia: A hernia is present. Hernia is present in the umbilical area.      Comments: Reducible.    Neurological:      General: " No focal deficit present.      Mental Status: She is alert. Mental status is at baseline.   Psychiatric:         Mood and Affect: Mood normal.         Behavior: Behavior normal.         Thought Content: Thought content normal.         Judgment: Judgment normal.               PHQ-9 Score  PHQ-9 Total Score:      Lab Review  Office Visit on 03/26/2025   Component Date Value Ref Range Status    Glucose 03/26/2025 128  70 - 130 mg/dL Final    Lot Number 03/26/2025 [id number]   Final    Expiration Date 03/26/2025 09/12/2025   Final        Radiology Results        Assessment / Plan         Assessment and Plan   Diagnoses and all orders for this visit:    1. Umbilical hernia without obstruction and without gangrene (Primary)  -     CT Abdomen Pelvis Without Contrast; Future        Assessment & Plan  1. Abdominal hernia.  - The hernia is reducible upon palpation, which is a positive sign.  - A CT scan of the abdomen will be ordered to evaluate the size and extent of the hernia.  - If the hernia worsens or becomes irreducible, immediate medical attention at the hospital is advised.  - Depending on the CT scan results, a referral to general surgery may be considered for potential surgical intervention.      Discussed possible differential diagnoses, testing, treatment, recommended non-pharmacological interventions, risks, warning signs to monitor for that would indicate need for follow-up in clinic or ER. If no improvement with these regimens or you have new or worsening symptoms follow-up. Patient verbalizes understanding and agreement with plan of care. Denies further needs or concerns.     Patient was given instructions and counseling regarding her condition and for health maintenance advised.            Health Maintenance  Health Maintenance:   Health Maintenance Due   Topic Date Due    DIABETIC FOOT EXAM  06/16/2021    URINE MICROALBUMIN-CREATININE RATIO (uACR)  06/17/2025    ANNUAL WELLNESS VISIT  07/23/2025     HEMOGLOBIN A1C  07/23/2025        Meds ordered during this visit  No orders of the defined types were placed in this encounter.      Meds stopped during this visit:  There are no discontinued medications.     Visit Diagnoses    ICD-10-CM ICD-9-CM   1. Umbilical hernia without obstruction and without gangrene  K42.9 553.1       Patient was given instructions and counseling regarding her condition or for health maintenance advice. Please see specific information pulled into the AVS if appropriate.     Follow Up   Return for Next scheduled follow up.      Patient or patient representative verbalized consent for the use of Ambient Listening during the visit with  Mariam Miranda DO for chart documentation. 5/8/2025  09:58 EDT      This document has been electronically signed by Mariam Miranda DO   May 8, 2025 09:58 EDT    Dictated Utilizing Dragon Dictation: Part of this note may be an electronic transcription/translation of spoken language to printed text using the Dragon Dictation System.    Mariam Miranda D.O.  [facility] [place]

## 2025-05-09 DIAGNOSIS — E11.9 TYPE 2 DIABETES MELLITUS WITHOUT COMPLICATION, WITHOUT LONG-TERM CURRENT USE OF INSULIN: ICD-10-CM

## 2025-05-22 ENCOUNTER — HOSPITAL ENCOUNTER (OUTPATIENT)
Facility: HOSPITAL | Age: 80
Discharge: HOME | End: 2025-05-22
Admitting: FAMILY MEDICINE
Payer: MEDICARE

## 2025-05-22 DIAGNOSIS — K42.9 UMBILICAL HERNIA WITHOUT OBSTRUCTION AND WITHOUT GANGRENE: ICD-10-CM

## 2025-05-22 PROCEDURE — 74176 CT ABD & PELVIS W/O CONTRAST: CPT

## 2025-06-04 ENCOUNTER — TELEPHONE (OUTPATIENT)
Dept: FAMILY MEDICINE CLINIC | Facility: CLINIC | Age: 80
End: 2025-06-04

## 2025-06-04 RX ORDER — CARVEDILOL 25 MG/1
25 TABLET ORAL 2 TIMES DAILY WITH MEALS
Qty: 180 TABLET | Refills: 0 | Status: SHIPPED | OUTPATIENT
Start: 2025-06-04

## 2025-06-04 NOTE — TELEPHONE ENCOUNTER
Rx Refill Note  Requested Prescriptions     Pending Prescriptions Disp Refills    carvedilol (COREG) 25 MG tablet [Pharmacy Med Name: Carvedilol Oral Tablet 25 MG] 180 tablet 0     Sig: TAKE 1 TABLET BY MOUTH 2 TIMES A DAY WITH MEALS      Last office visit with prescribing clinician: 5/8/2025   Last telemedicine visit with prescribing clinician: Visit date not found   Next office visit with prescribing clinician: 7/11/2025                         Would you like a call back once the refill request has been completed: [] Yes [] No    If the office needs to give you a call back, can they leave a voicemail: [] Yes [] No    Elaine Craven MA  06/04/25, 14:22 EDT

## 2025-06-04 NOTE — TELEPHONE ENCOUNTER
Caller: Leila Pierre    Relationship: Self    Best call back number: [phone]    What was the call regarding: THE RESULTS OF HER CT SCAN STATES SHE HAS NODULES IN HER RIGHT LUNG AND RIGHT OVARY. SHE IS VERY CONCERNED AND WANTS TO HEAR FROM DR SAMEERA RUTLEDGE    Is it okay if the provider responds through MyChart: NO

## 2025-06-05 DIAGNOSIS — N83.8 CALCIFICATION OF OVARY: Primary | ICD-10-CM

## 2025-06-05 NOTE — TELEPHONE ENCOUNTER
Patient notified of providers response message and verbalized understanding. She would like referral to gyn.

## 2025-06-24 DIAGNOSIS — Z76.0 MEDICATION REFILL: ICD-10-CM

## 2025-06-24 RX ORDER — FUROSEMIDE 20 MG/1
20 TABLET ORAL 2 TIMES DAILY
Qty: 180 TABLET | Refills: 0 | Status: SHIPPED | OUTPATIENT
Start: 2025-06-24

## 2025-06-24 RX ORDER — BACLOFEN 10 MG/1
10 TABLET ORAL 2 TIMES DAILY
Qty: 180 TABLET | Refills: 0 | Status: SHIPPED | OUTPATIENT
Start: 2025-06-24

## 2025-06-25 ENCOUNTER — OFFICE VISIT (OUTPATIENT)
Dept: FAMILY MEDICINE CLINIC | Facility: CLINIC | Age: 80
End: 2025-06-25
Payer: MEDICARE

## 2025-06-25 VITALS
HEIGHT: 65 IN | BODY MASS INDEX: 28.29 KG/M2 | SYSTOLIC BLOOD PRESSURE: 136 MMHG | WEIGHT: 169.8 LBS | OXYGEN SATURATION: 98 % | DIASTOLIC BLOOD PRESSURE: 70 MMHG | TEMPERATURE: 98.6 F | HEART RATE: 68 BPM

## 2025-06-25 DIAGNOSIS — N90.89 VULVAR IRRITATION: ICD-10-CM

## 2025-06-25 DIAGNOSIS — N89.8 VAGINAL ITCHING: Primary | ICD-10-CM

## 2025-06-25 DIAGNOSIS — B37.31 VULVAR CANDIDIASIS: ICD-10-CM

## 2025-06-25 LAB
BILIRUB BLD-MCNC: NEGATIVE MG/DL
CLARITY, POC: CLEAR
COLOR UR: YELLOW
EXPIRATION DATE: NORMAL
GLUCOSE UR STRIP-MCNC: NEGATIVE MG/DL
KETONES UR QL: NEGATIVE
LEUKOCYTE EST, POC: NEGATIVE
Lab: NORMAL
NITRITE UR-MCNC: NEGATIVE MG/ML
PH UR: 6 [PH] (ref 5–8)
PROT UR STRIP-MCNC: NEGATIVE MG/DL
RBC # UR STRIP: NEGATIVE /UL
SP GR UR: 1.01 (ref 1–1.03)
UROBILINOGEN UR QL: NORMAL

## 2025-06-25 PROCEDURE — 1160F RVW MEDS BY RX/DR IN RCRD: CPT | Performed by: PHYSICIAN ASSISTANT

## 2025-06-25 PROCEDURE — 81003 URINALYSIS AUTO W/O SCOPE: CPT | Performed by: PHYSICIAN ASSISTANT

## 2025-06-25 PROCEDURE — 1159F MED LIST DOCD IN RCRD: CPT | Performed by: PHYSICIAN ASSISTANT

## 2025-06-25 PROCEDURE — 1126F AMNT PAIN NOTED NONE PRSNT: CPT | Performed by: PHYSICIAN ASSISTANT

## 2025-06-25 PROCEDURE — 3075F SYST BP GE 130 - 139MM HG: CPT | Performed by: PHYSICIAN ASSISTANT

## 2025-06-25 PROCEDURE — 3078F DIAST BP <80 MM HG: CPT | Performed by: PHYSICIAN ASSISTANT

## 2025-06-25 PROCEDURE — 99213 OFFICE O/P EST LOW 20 MIN: CPT | Performed by: PHYSICIAN ASSISTANT

## 2025-06-25 RX ORDER — CLOTRIMAZOLE 1 %
1 CREAM (GRAM) TOPICAL 2 TIMES DAILY
Qty: 28 G | Refills: 0 | Status: SHIPPED | OUTPATIENT
Start: 2025-06-25

## 2025-06-25 RX ORDER — FLUCONAZOLE 150 MG/1
150 TABLET ORAL ONCE
Qty: 2 TABLET | Refills: 0 | Status: SHIPPED | OUTPATIENT
Start: 2025-06-25 | End: 2025-06-25

## 2025-06-25 NOTE — PROGRESS NOTES
Follow Up Office Visit      Patient Name: Leila Pierre  : 1945   MRN: [id number]     Chief Complaint:    Chief Complaint   Patient presents with    possible vaginal infection     Per patient her vagina burns at all times, itches. States she feels scalded. Patient states she just got back from vacation.        History of Present Illness  80-year-old female presents with chronic medical conditions including type 2 diabetes, hyperlipidemia, hypertension, hypothyroidism, ANTONIO, vitamin D deficiency presents today with vaginal irritation.    Patient reports experiencing significant vaginal irritation characterized by burning and itching sensations.. These symptoms have been causing significant discomfort, to the point of disrupting her sleep. The onset of these symptoms was noted during her recent vacation, either on 2025 or 2025. She has attempted self-treatment with Vagisil, which provided temporary relief but required frequent reapplication. She reports that this is her first experience with such an infection.  She denies any associated symptoms such as abnormal vaginal discharge, odor, or lower urinary tract symptoms.        Subjective      I have reviewed and the following portions of the patient's history were updated as appropriate: past family history, past medical history, past social history, past surgical history and problem list.    Medications:     Current Outpatient Medications:     Accu-Chek FastClix Lancets misc, Use 1 each Daily. E11.65, Disp: 100 each, Rfl: 3    amLODIPine (NORVASC) 10 MG tablet, TAKE 1 TABLET BY MOUTH EVERY DAY, Disp: 90 tablet, Rfl: 0    aspirin 81 MG EC tablet, Take 1 tablet by mouth Daily., Disp: , Rfl:     baclofen (LIORESAL) 10 MG tablet, TAKE 1 TABLET BY MOUTH 2 TIMES A DAY, Disp: 180 tablet, Rfl: 0    BIOTIN 5000 PO, Take 1 tablet by mouth Daily., Disp: , Rfl:     Blood Glucose Monitoring Suppl (Accu-Chek Guide) w/Device kit, 1 each See Admin  Instructions. Dx E11.65, Disp: 1 kit, Rfl: 0    Calcium Carbonate (CALCIUM 600 PO), Take 1 tablet by mouth Daily., Disp: , Rfl:     candesartan (ATACAND) 16 MG tablet, Take 1 tablet by mouth Daily., Disp: 90 tablet, Rfl: 3    carvedilol (COREG) 25 MG tablet, TAKE 1 TABLET BY MOUTH 2 TIMES A DAY WITH MEALS, Disp: 180 tablet, Rfl: 0    cholecalciferol (VITAMIN D3) 1000 UNITS tablet, Take 1 tablet by mouth Daily., Disp: , Rfl:     cloNIDine (CATAPRES-TTS) 0.2 MG/24HR patch, APPLY 1 PATCH ON THE SKIN AS DIRECTED BY PROVIDER ONCE A WEEK, Disp: 12 patch, Rfl: 3    famotidine (PEPCID) 20 MG tablet, Take 1 tablet by mouth 2 (Two) Times a Day., Disp: , Rfl:     fexofenadine (ALLEGRA) 180 MG tablet, Take 1 tablet by mouth Daily., Disp: , Rfl:     finasteride (PROSCAR) 5 MG tablet, , Disp: , Rfl:     furosemide (LASIX) 20 MG tablet, TAKE 1 TABLET BY MOUTH 2 TIMES A DAY, Disp: 180 tablet, Rfl: 0    glucose blood (Accu-Chek Guide) test strip, USE AS INSTRUCTED TO TEST BLOOD GLUCOSE DAILY.  Dx E11.65, Disp: 100 each, Rfl: 3    levothyroxine (SYNTHROID, LEVOTHROID) 125 MCG tablet, TAKE 1 TABLET BY MOUTH EVERY DAY, Disp: 90 tablet, Rfl: 3    magnesium oxide (MAG-OX) 400 MG tablet, Take 1 tablet by mouth Daily., Disp: 90 tablet, Rfl: 0    metFORMIN (GLUCOPHAGE) 500 MG tablet, TAKE 2 TABLETS BY MOUTH 2 TIMES A DAY WITH MEALS, Disp: 360 tablet, Rfl: 0    potassium chloride (KLOR-CON M10) 10 MEQ CR tablet, TAKE 1 TABLET BY MOUTH EVERY DAY, Disp: 90 tablet, Rfl: 0    clotrimazole (LOTRIMIN) 1 % cream, Apply 1 Application topically to the appropriate area as directed 2 (Two) Times a Day., Disp: 28 g, Rfl: 0    fluconazole (DIFLUCAN) 150 MG tablet, Take 1 tablet by mouth 1 (One) Time for 1 dose. Repeat in 72 hours if symptoms persist, Disp: 2 tablet, Rfl: 0    Allergies:   Allergies   Allergen Reactions    Adhesive Tape Unknown - Low Severity    Amoxicillin GI Bleeding    Clarithromycin     Decongest-Aid [Pseudoephedrine]      Gives too  "much energy and patient is unable to sleep    Irbesartan     Lescol [Fluvastatin Sodium] Diarrhea    Lipitor [Atorvastatin] Myalgia    Other      bandaids     Sulfa Antibiotics Unknown (See Comments) and Unknown - Low Severity    Valsartan     Sulfabenzamide Rash       Objective     Physical Exam:   Physical Exam  Constitutional:       General: She is not in acute distress.     Appearance: She is not ill-appearing.   HENT:      Head: Normocephalic.   Cardiovascular:      Rate and Rhythm: Normal rate and regular rhythm.      Heart sounds: No murmur heard.  Pulmonary:      Effort: Pulmonary effort is normal. No respiratory distress.      Breath sounds: Normal breath sounds.   Genitourinary:     Comments: Slight irritation/erythema of vulva  Musculoskeletal:         General: Normal range of motion.   Skin:     General: Skin is warm.   Neurological:      General: No focal deficit present.      Mental Status: She is alert.   Psychiatric:         Mood and Affect: Mood normal.         Vital Signs:   Vitals:    06/25/25 0949   BP: 136/70   Pulse: 68   Temp: 98.6 °F (37 °C)   TempSrc: Infrared   SpO2: 98%   Weight: 77 kg (169 lb 12.8 oz)   Height: 165.1 cm (65\")   PainSc: 0-No pain     Body mass index is 28.26 kg/m².         Procedures    Assessment / Plan         Assessment and Plan     Diagnoses and all orders for this visit:    1. Vaginal itching (Primary)  -     POC Urinalysis Dipstick, Automated    2. Vulvar irritation    3. Vulvar candidiasis  -     clotrimazole (LOTRIMIN) 1 % cream; Apply 1 Application topically to the appropriate area as directed 2 (Two) Times a Day.  Dispense: 28 g; Refill: 0  -     fluconazole (DIFLUCAN) 150 MG tablet; Take 1 tablet by mouth 1 (One) Time for 1 dose. Repeat in 72 hours if symptoms persist  Dispense: 2 tablet; Refill: 0    Signs and symptoms concerning for vulvar candidiasis  Point-of-care urinalysis unremarkable with  Will begin topical clotrimazole and single dose of " fluconazole.  Patient encouraged to let us know if symptoms persist or worsen.         Follow Up:   Return if symptoms worsen or fail to improve.    Patient or patient representative verbalized consent for the use of Ambient Listening during the visit with  Shira Ghosh PA-C for chart documentation. 6/25/2025  09:57 EDT    Shira Ghosh PA-C    [facility]

## 2025-07-16 DIAGNOSIS — I10 ESSENTIAL HYPERTENSION: ICD-10-CM

## 2025-07-16 RX ORDER — CANDESARTAN 16 MG/1
16 TABLET ORAL DAILY
Qty: 90 TABLET | Refills: 3 | Status: SHIPPED | OUTPATIENT
Start: 2025-07-16

## 2025-07-25 DIAGNOSIS — I10 ESSENTIAL HYPERTENSION: ICD-10-CM

## 2025-07-25 RX ORDER — POTASSIUM CHLORIDE 750 MG/1
10 TABLET, EXTENDED RELEASE ORAL DAILY
Qty: 90 TABLET | Refills: 0 | Status: SHIPPED | OUTPATIENT
Start: 2025-07-25

## 2025-07-29 ENCOUNTER — OFFICE VISIT (OUTPATIENT)
Dept: FAMILY MEDICINE CLINIC | Facility: CLINIC | Age: 80
End: 2025-07-29
Payer: MEDICARE

## 2025-07-29 ENCOUNTER — LAB (OUTPATIENT)
Dept: LAB | Facility: HOSPITAL | Age: 80
End: 2025-07-29
Payer: MEDICARE

## 2025-07-29 VITALS
SYSTOLIC BLOOD PRESSURE: 130 MMHG | OXYGEN SATURATION: 95 % | DIASTOLIC BLOOD PRESSURE: 78 MMHG | HEART RATE: 60 BPM | TEMPERATURE: 98.6 F | BODY MASS INDEX: 28.16 KG/M2 | WEIGHT: 169.2 LBS

## 2025-07-29 DIAGNOSIS — M15.9 GENERALIZED OSTEOARTHRITIS: ICD-10-CM

## 2025-07-29 DIAGNOSIS — E55.9 VITAMIN D DEFICIENCY: ICD-10-CM

## 2025-07-29 DIAGNOSIS — E78.01 FAMILIAL HYPERCHOLESTEROLEMIA: ICD-10-CM

## 2025-07-29 DIAGNOSIS — Z00.00 ENCOUNTER FOR SUBSEQUENT ANNUAL WELLNESS VISIT (AWV) IN MEDICARE PATIENT: ICD-10-CM

## 2025-07-29 DIAGNOSIS — E11.9 TYPE 2 DIABETES MELLITUS WITHOUT COMPLICATION, WITHOUT LONG-TERM CURRENT USE OF INSULIN: ICD-10-CM

## 2025-07-29 DIAGNOSIS — E03.9 HYPOTHYROIDISM, ADULT: ICD-10-CM

## 2025-07-29 DIAGNOSIS — I10 ESSENTIAL HYPERTENSION: ICD-10-CM

## 2025-07-29 DIAGNOSIS — Z80.41 FAMILY HISTORY OF OVARIAN CANCER: ICD-10-CM

## 2025-07-29 DIAGNOSIS — N83.8 CALCIFICATION OF OVARY: ICD-10-CM

## 2025-07-29 DIAGNOSIS — Z00.00 ENCOUNTER FOR SUBSEQUENT ANNUAL WELLNESS VISIT (AWV) IN MEDICARE PATIENT: Primary | ICD-10-CM

## 2025-07-29 DIAGNOSIS — R91.1 NODULE OF RIGHT LUNG: ICD-10-CM

## 2025-07-29 PROBLEM — L03.039 PARONYCHIA OF GREAT TOE: Status: RESOLVED | Noted: 2023-11-21 | Resolved: 2025-07-29

## 2025-07-29 LAB
25(OH)D3 SERPL-MCNC: 50 NG/ML (ref 30–100)
ALBUMIN SERPL-MCNC: 4.6 G/DL (ref 3.5–5.2)
ALBUMIN/GLOB SERPL: 1.6 G/DL
ALP SERPL-CCNC: 52 U/L (ref 39–117)
ALT SERPL W P-5'-P-CCNC: 35 U/L (ref 1–33)
ANION GAP SERPL CALCULATED.3IONS-SCNC: 16 MMOL/L (ref 5–15)
AST SERPL-CCNC: 28 U/L (ref 1–32)
BASOPHILS # BLD AUTO: 0.06 10*3/MM3 (ref 0–0.2)
BASOPHILS NFR BLD AUTO: 0.8 % (ref 0–1.5)
BILIRUB SERPL-MCNC: 0.4 MG/DL (ref 0–1.2)
BUN SERPL-MCNC: 12 MG/DL (ref 8–23)
BUN/CREAT SERPL: 14.8 (ref 7–25)
CALCIUM SPEC-SCNC: 10 MG/DL (ref 8.6–10.5)
CHLORIDE SERPL-SCNC: 104 MMOL/L (ref 98–107)
CHOLEST SERPL-MCNC: 227 MG/DL (ref 0–200)
CO2 SERPL-SCNC: 21 MMOL/L (ref 22–29)
CREAT SERPL-MCNC: 0.81 MG/DL (ref 0.57–1)
DEPRECATED RDW RBC AUTO: 45.1 FL (ref 37–54)
EGFRCR SERPLBLD CKD-EPI 2021: 74.4 ML/MIN/1.73
EOSINOPHIL # BLD AUTO: 0.11 10*3/MM3 (ref 0–0.4)
EOSINOPHIL NFR BLD AUTO: 1.5 % (ref 0.3–6.2)
ERYTHROCYTE [DISTWIDTH] IN BLOOD BY AUTOMATED COUNT: 13.3 % (ref 12.3–15.4)
GLOBULIN UR ELPH-MCNC: 2.8 GM/DL
GLUCOSE SERPL-MCNC: 133 MG/DL (ref 65–99)
HBA1C MFR BLD: 6.7 % (ref 4.8–5.6)
HCT VFR BLD AUTO: 44.2 % (ref 34–46.6)
HDLC SERPL-MCNC: 42 MG/DL (ref 40–60)
HGB BLD-MCNC: 14.9 G/DL (ref 12–15.9)
IMM GRANULOCYTES # BLD AUTO: 0.03 10*3/MM3 (ref 0–0.05)
IMM GRANULOCYTES NFR BLD AUTO: 0.4 % (ref 0–0.5)
LDLC SERPL CALC-MCNC: 152 MG/DL (ref 0–100)
LDLC/HDLC SERPL: 3.55 {RATIO}
LYMPHOCYTES # BLD AUTO: 2.54 10*3/MM3 (ref 0.7–3.1)
LYMPHOCYTES NFR BLD AUTO: 34 % (ref 19.6–45.3)
MCH RBC QN AUTO: 31 PG (ref 26.6–33)
MCHC RBC AUTO-ENTMCNC: 33.7 G/DL (ref 31.5–35.7)
MCV RBC AUTO: 92.1 FL (ref 79–97)
MONOCYTES # BLD AUTO: 0.63 10*3/MM3 (ref 0.1–0.9)
MONOCYTES NFR BLD AUTO: 8.4 % (ref 5–12)
NEUTROPHILS NFR BLD AUTO: 4.09 10*3/MM3 (ref 1.7–7)
NEUTROPHILS NFR BLD AUTO: 54.9 % (ref 42.7–76)
PLATELET # BLD AUTO: 233 10*3/MM3 (ref 140–450)
PMV BLD AUTO: 13.3 FL (ref 6–12)
POTASSIUM SERPL-SCNC: 4.7 MMOL/L (ref 3.5–5.2)
PROT SERPL-MCNC: 7.4 G/DL (ref 6–8.5)
RBC # BLD AUTO: 4.8 10*6/MM3 (ref 3.77–5.28)
SODIUM SERPL-SCNC: 141 MMOL/L (ref 136–145)
TRIGL SERPL-MCNC: 180 MG/DL (ref 0–150)
TSH SERPL DL<=0.05 MIU/L-ACNC: 0.77 UIU/ML (ref 0.27–4.2)
VIT B12 BLD-MCNC: 256 PG/ML (ref 211–946)
VLDLC SERPL-MCNC: 33 MG/DL (ref 5–40)
WBC NRBC COR # BLD AUTO: 7.46 10*3/MM3 (ref 3.4–10.8)

## 2025-07-29 PROCEDURE — 82306 VITAMIN D 25 HYDROXY: CPT

## 2025-07-29 PROCEDURE — 1170F FXNL STATUS ASSESSED: CPT | Performed by: FAMILY MEDICINE

## 2025-07-29 PROCEDURE — 83036 HEMOGLOBIN GLYCOSYLATED A1C: CPT

## 2025-07-29 PROCEDURE — G2211 COMPLEX E/M VISIT ADD ON: HCPCS | Performed by: FAMILY MEDICINE

## 2025-07-29 PROCEDURE — 99214 OFFICE O/P EST MOD 30 MIN: CPT | Performed by: FAMILY MEDICINE

## 2025-07-29 PROCEDURE — 84443 ASSAY THYROID STIM HORMONE: CPT

## 2025-07-29 PROCEDURE — G0439 PPPS, SUBSEQ VISIT: HCPCS | Performed by: FAMILY MEDICINE

## 2025-07-29 PROCEDURE — 82043 UR ALBUMIN QUANTITATIVE: CPT | Performed by: FAMILY MEDICINE

## 2025-07-29 PROCEDURE — 80053 COMPREHEN METABOLIC PANEL: CPT

## 2025-07-29 PROCEDURE — 3078F DIAST BP <80 MM HG: CPT | Performed by: FAMILY MEDICINE

## 2025-07-29 PROCEDURE — 80061 LIPID PANEL: CPT

## 2025-07-29 PROCEDURE — 1126F AMNT PAIN NOTED NONE PRSNT: CPT | Performed by: FAMILY MEDICINE

## 2025-07-29 PROCEDURE — 82607 VITAMIN B-12: CPT

## 2025-07-29 PROCEDURE — 3075F SYST BP GE 130 - 139MM HG: CPT | Performed by: FAMILY MEDICINE

## 2025-07-29 PROCEDURE — 82570 ASSAY OF URINE CREATININE: CPT | Performed by: FAMILY MEDICINE

## 2025-07-29 PROCEDURE — 85025 COMPLETE CBC W/AUTO DIFF WBC: CPT

## 2025-07-29 RX ORDER — CANDESARTAN 16 MG/1
16 TABLET ORAL DAILY
Qty: 90 TABLET | Refills: 3 | Status: SHIPPED | OUTPATIENT
Start: 2025-07-29

## 2025-07-29 RX ORDER — CLONIDINE 0.2 MG/24H
1 PATCH, EXTENDED RELEASE TRANSDERMAL WEEKLY
COMMUNITY

## 2025-07-29 RX ORDER — BLOOD SUGAR DIAGNOSTIC
STRIP MISCELLANEOUS
Qty: 100 EACH | Refills: 0 | Status: SHIPPED | OUTPATIENT
Start: 2025-07-29

## 2025-07-29 RX ORDER — POTASSIUM CHLORIDE 750 MG/1
10 TABLET, EXTENDED RELEASE ORAL DAILY
Qty: 90 TABLET | Refills: 0 | Status: SHIPPED | OUTPATIENT
Start: 2025-07-29

## 2025-07-29 NOTE — PROGRESS NOTES
Subjective   The ABCs of the Annual Wellness Visit  Medicare Wellness Visit      Leila Pierre is a 80 y.o. patient who presents for a Medicare Wellness Visit.    The following portions of the patient's history were reviewed and   updated as appropriate: allergies, current medications, past family history, past medical history, past social history, past surgical history, and problem list.    Compared to one year ago, the patient's physical   health is the same.  Compared to one year ago, the patient's mental   health is the same.    Recent Hospitalizations:  She was not admitted to the hospital during the last year.     Current Medical Providers:  Patient Care Team:  Mariam Miranda DO as PCP - General (Family Medicine)  Ramírez Calabrese MD as Consulting Physician (Endocrinology)  Janine Long MD as Consulting Physician (Dermatology)  Benjamin Andres APRN as Nurse Practitioner (Nurse Practitioner)    Outpatient Medications Prior to Visit   Medication Sig Dispense Refill    Accu-Chek FastClix Lancets misc Use 1 each Daily. E11.65 100 each 3    aspirin 81 MG EC tablet Take 1 tablet by mouth Daily.      baclofen (LIORESAL) 10 MG tablet TAKE 1 TABLET BY MOUTH 2 TIMES A  tablet 0    BIOTIN 5000 PO Take 1 tablet by mouth Daily.      Blood Glucose Monitoring Suppl (Accu-Chek Guide) w/Device kit 1 each See Admin Instructions. Dx E11.65 1 kit 0    Calcium Carbonate (CALCIUM 600 PO) Take 1 tablet by mouth Daily.      cholecalciferol (VITAMIN D3) 1000 UNITS tablet Take 1 tablet by mouth Daily.      cloNIDine (CATAPRES-TTS) 0.2 MG/24HR patch Place 1 patch on the skin as directed by provider 1 (One) Time Per Week.      clotrimazole (LOTRIMIN) 1 % cream Apply 1 Application topically to the appropriate area as directed 2 (Two) Times a Day. 28 g 0    famotidine (PEPCID) 20 MG tablet Take 1 tablet by mouth 2 (Two) Times a Day.      fexofenadine (ALLEGRA) 180 MG tablet Take 1 tablet by mouth Daily.       finasteride (PROSCAR) 5 MG tablet       glucose blood (Accu-Chek Guide) test strip USE AS INSTRUCTED TO TEST BLOOD GLUCOSE DAILY.  Dx E11.65 100 each 3    levothyroxine (SYNTHROID, LEVOTHROID) 125 MCG tablet TAKE 1 TABLET BY MOUTH EVERY DAY 90 tablet 3    magnesium oxide (MAG-OX) 400 MG tablet Take 1 tablet by mouth Daily. 90 tablet 0    candesartan (ATACAND) 16 MG tablet TAKE 1 TABLET DAILY 90 tablet 3    metFORMIN (GLUCOPHAGE) 500 MG tablet TAKE 2 TABLETS BY MOUTH 2 TIMES A DAY WITH MEALS 360 tablet 0    potassium chloride (KLOR-CON M10) 10 MEQ CR tablet TAKE 1 TABLET BY MOUTH EVERY DAY 90 tablet 0     No facility-administered medications prior to visit.     No opioid medication identified on active medication list. I have reviewed chart for other potential  high risk medication/s and harmful drug interactions in the elderly.      Aspirin is on active medication list. Aspirin use is indicated based on review of current medical condition/s. Pros and cons of this therapy have been discussed today. Benefits of this medication outweigh potential harm.  Patient has been encouraged to continue taking this medication.  .      Patient Active Problem List   Diagnosis    Elevated alanine aminotransferase (ALT) level    Hyperlipidemia    Vitamin D deficiency    Type 2 diabetes mellitus without complication, without long-term current use of insulin    Generalized osteoarthritis    Eczema    Essential hypertension    Muscle spasm    Mild obstructive sleep apnea    Snoring    Encounter for subsequent annual wellness visit (AWV) in Medicare patient    Nontoxic multinodular goiter    Hypothyroidism, adult    Right hip pain    Acute non-recurrent maxillary sinusitis     Advance Care Planning Advance Directive is not on file.  ACP discussion was held with the patient during this visit. Patient does not have an advance directive, information provided.            Objective   Vitals:    07/29/25 1034   BP: 130/78   Pulse: 60   Temp:  "98.6 °F (37 °C)   TempSrc: Temporal   SpO2: 95%   Weight: 76.7 kg (169 lb 3.2 oz)   PainSc: 0-No pain       Estimated body mass index is 28.16 kg/m² as calculated from the following:    Height as of 25: 165.1 cm (65\").    Weight as of this encounter: 76.7 kg (169 lb 3.2 oz).        Does the patient have evidence of cognitive impairment? No                                                                                                Health  Risk Assessment    Smoking Status:  Social History     Tobacco Use   Smoking Status Never    Passive exposure: Never   Smokeless Tobacco Never   Tobacco Comments    L     Alcohol Consumption:  Social History     Substance and Sexual Activity   Alcohol Use Yes    Alcohol/week: 1.0 - 2.0 standard drink of alcohol    Types: 1 - 2 Glasses of wine per week    Comment: occasional wine       Fall Risk Screen  LIV Fall Risk Assessment was completed, and patient is at LOW risk for falls.Assessment completed on:2025    Depression Screening   Little interest or pleasure in doing things? Not at all   Feeling down, depressed, or hopeless? Not at all   PHQ-2 Total Score 0      Health Habits and Functional and Cognitive Screenin/29/2025    10:42 AM   Functional & Cognitive Status   Do you have difficulty preparing food and eating? No   Do you have difficulty bathing yourself, getting dressed or grooming yourself? No   Do you have difficulty using the toilet? No   Do you have difficulty moving around from place to place? No   Do you have trouble with steps or getting out of a bed or a chair? No   Current Diet Limited Junk Food   Dental Exam Up to date   Eye Exam Up to date   Exercise (times per week) 0 times per week   Current Exercises Include No Regular Exercise   Do you need help using the phone?  No   Are you deaf or do you have serious difficulty hearing?  No   Do you need help to go to places out of walking distance? No   Do you need help shopping? No   Do you need " help preparing meals?  No   Do you need help with housework?  No   Do you need help with laundry? No   Do you need help taking your medications? No   Do you need help managing money? No   Do you ever drive or ride in a car without wearing a seat belt? No   Have you felt unusual fatigue (could be tiredness), stress, anger or loneliness in the last month? No   Who do you live with? Spouse   If you need help, do you have trouble finding someone available to you? No   Have you been bothered in the last four weeks by sexual problems? No   Do you have difficulty concentrating, remembering or making decisions? No           Age-appropriate Screening Schedule:  Refer to the list below for future screening recommendations based on patient's age, sex and/or medical conditions. Orders for these recommended tests are listed in the plan section. The patient has been provided with a written plan.    Health Maintenance List  Health Maintenance   Topic Date Due    HEMOGLOBIN A1C  07/23/2025    URINE MICROALBUMIN-CREATININE RATIO (uACR)  09/18/2025    COVID-19 Vaccine (4 - 2024-25 season) 08/12/2025 (Originally 9/1/2024)    RSV Vaccine - Adults (1 - 1-dose 75+ series) 05/08/2026 (Originally 3/29/2022)    INFLUENZA VACCINE  10/01/2025    DIABETIC EYE EXAM  12/31/2025    LIPID PANEL  01/23/2026    ANNUAL WELLNESS VISIT  07/29/2026    DIABETIC FOOT EXAM  07/29/2026    DXA SCAN  10/29/2026    COLORECTAL CANCER SCREENING  11/08/2028    TDAP/TD VACCINES (4 - Td or Tdap) 06/22/2031    HEPATITIS C SCREENING  Completed    ZOSTER VACCINE  Completed    Pneumococcal Vaccine 50+  Discontinued    MAMMOGRAM  Discontinued                                                                                                                                                CMS Preventative Services Quick Reference  Risk Factors Identified During Encounter  Fall Risk-High or Moderate: Information on Fall Prevention Shared in After Visit Summary  Immunizations  Discussed/Encouraged: Influenza  Dental Screening Recommended  Vision Screening Recommended    The above risks/problems have been discussed with the patient.  Pertinent information has been shared with the patient in the After Visit Summary.  An After Visit Summary and PPPS were made available to the patient.    Follow Up:   Next Medicare Wellness visit to be scheduled in 1 year.         Additional E&M Note during same encounter follows:  Patient has additional, significant, and separately identifiable condition(s)/problem(s) that require work above and beyond the Medicare Wellness Visit     Chief Complaint  Medicare Wellness-subsequent (No concerns. )    Subjective    HPI  Leila is also being seen today for additional medical problem/s.      Her past medical history includes hypertension, hyperlipidemia, type 2 diabetes, hypothyroidism, vitamin D deficiency obstructive sleep apnea.  She follows with endocrinology for treatment of her diabetes          The patient is a 80-year-old female who presents for evaluation of a lung nodule, a calcified lesion in the left ovary, and hair thinning.    She is scheduled for hernia surgery on 09/15/2025, to be performed by Dr. Morales, a general surgeon. She has no history of surgical complications, with her only previous surgeries being a hip replacement and tubal ligation.    A recent CT scan revealed a 2 mm nodule in her lungs. Despite not being a smoker, she was exposed to secondhand smoke from her mother and  for many years. She is interested in annual follow-ups for this condition.    The same CT scan also showed a calcified lesion in her left ovary, measuring 17 mm. She undergoes annual vaginal ultrasounds at  due to her mother's history of ovarian cancer. Her next ultrasound is scheduled for 08/12/2025, and she has an appointment with a gynecologist in 09/2025.    She reports that her physical health has remained stable over the past year, and she describes her  mental health as excellent. She does not have an advanced directive or advanced care plan in place. She has not experienced any falls recently. She is up to date on her dental exams, which she attends every 6 months. Her last eye exam was in 12/2024, and she is due for another at the end of this year.      Tobacco: The patient reports exposure to secondhand smoke from her mother and .    PAST SURGICAL HISTORY:  - Hip replacement  - Tubal ligation    FAMILY HISTORY  Her mother had ovarian cancer, pancreatic cancer, and stomach cancer.          Objective   Vital Signs:  /78   Pulse 60   Temp 98.6 °F (37 °C) (Temporal)   Wt 76.7 kg (169 lb 3.2 oz)   SpO2 95%   BMI 28.16 kg/m²   Physical Exam  Constitutional:       Appearance: She is not ill-appearing.   Eyes:      Pupils: Pupils are equal, round, and reactive to light.   Cardiovascular:      Rate and Rhythm: Normal rate.      Pulses: Normal pulses.           Dorsalis pedis pulses are 2+ on the right side and 2+ on the left side.        Posterior tibial pulses are 2+ on the right side and 2+ on the left side.   Pulmonary:      Effort: Pulmonary effort is normal.      Breath sounds: Normal breath sounds.   Musculoskeletal:      Right foot: No Charcot foot.      Left foot: No Charcot foot.   Feet:      Right foot:      Skin integrity: Skin integrity normal.      Left foot:      Skin integrity: Skin integrity normal.   Neurological:      General: No focal deficit present.      Mental Status: She is alert. Mental status is at baseline.   Psychiatric:         Mood and Affect: Mood normal.         Behavior: Behavior normal.         Thought Content: Thought content normal.         Judgment: Judgment normal.           Respiratory: Clear to auscultation, no wheezing, rales or rhonchi  Cardiovascular: Regular rate and rhythm, no murmurs, rubs, or gallops  Extremities: No swelling in the legs  Skin: Feet are slightly dry with no callus  underneath      Results  Imaging   - CT scan of the lungs: 2 mm nodule in the lungs.   - CT scan of the left ovary: Calcified lesion measuring 17 mm.           Assessment and Plan       Diagnoses and all orders for this visit:    1. Encounter for subsequent annual wellness visit (AWV) in Medicare patient (Primary)  Assessment & Plan:  Annual wellness exam completed today. Health Maintenance including immunizations was updated and reflected in the chart. Yearly screening labs were ordered.     Further recommendations to be given once lab data received.     Health advice: healthy food choices with fresh fruits and vegetables, maintain sleep pattern at least 8 hours, avoid texting and distracted driving practices; wear safety belt, engage in regular exercise, maintain healthy weight, use safe sex practices, avoid alcohol and illicit drugs. Maintain immunizations that are up to date. Maintain health maintenance: Colon cancer screening, DEXA, Mammogram, PAP Smear, etc.  Follow up with PCP if struggling with depression or anxiety. Keep regular dental and eye exams. Brush and floss teeth daily.     I suggest they take a daily multivitamin that is age-appropriate (example women's One-A-Day.)  Patient voiced understanding of these instructions.     Follow up Annually for Physical       Orders:  -     Microalbumin / Creatinine Urine Ratio - Urine, Clean Catch  -     Comprehensive Metabolic Panel; Future  -     CBC & Differential; Future  -     Hemoglobin A1c; Future  -     Lipid Panel; Future  -     TSH Rfx On Abnormal To Free T4; Future  -     Vitamin B12; Future  -     Vitamin D,25-Hydroxy; Future    2. Type 2 diabetes mellitus without complication, without long-term current use of insulin  -     Microalbumin / Creatinine Urine Ratio - Urine, Clean Catch  -     CBC & Differential; Future  -     Hemoglobin A1c; Future  -     Lipid Panel; Future  -     metFORMIN (GLUCOPHAGE) 500 MG tablet; Take 2 tablets by mouth 2 (Two)  Times a Day With Meals.  Dispense: 360 tablet; Refill: 0    3. Hypothyroidism, adult  -     TSH Rfx On Abnormal To Free T4; Future    4. Vitamin D deficiency  -     Vitamin D,25-Hydroxy; Future    5. Generalized osteoarthritis    6. Essential hypertension  -     Comprehensive Metabolic Panel; Future  -     candesartan (ATACAND) 16 MG tablet; Take 1 tablet by mouth Daily.  Dispense: 90 tablet; Refill: 3  -     potassium chloride (KLOR-CON M10) 10 MEQ CR tablet; Take 1 tablet by mouth Daily.  Dispense: 90 tablet; Refill: 0    7. Familial hypercholesterolemia  -     Comprehensive Metabolic Panel; Future  -     Lipid Panel; Future    8. Nodule of right lung  -     Ambulatory Referral to Lung Nodule Clinic - [place]    9. Calcification of ovary    10. Family history of ovarian cancer          1. Lung nodule.  - A 2 mm lung nodule was identified on a recent CT scan.  - Given its small size, it may not require immediate follow-up.  - Referral to the lung nodule clinic will be made for further evaluation by a radiologist/pulmonologist.  - Monitoring recommendations will be provided after the clinic evaluation.    2. Calcified lesion in the left ovary.  - A 17 mm calcified lesion was noted in the left ovary.  - In postmenopausal women, small ovarian lesions with calcifications are often incidental findings and are typically benign.  - Calcification itself is not a specific marker for malignancy.  - A pelvic ultrasound is scheduled for 08/12/2025 to monitor this lesion.    3. Health maintenance.  - She is up to date on all her vaccines except for the COVID-19 vaccine.  - Blood work will be conducted today to assess her A1c levels.  - She is advised to receive her influenza vaccine in the fall.  - Regular eye and dental exams are maintained.           Follow Up   Return in about 3 months (around 10/29/2025) for followup HTN, HLD, DM .  Patient was given instructions and counseling regarding her condition or for health  maintenance advice. Please see specific information pulled into the AVS if appropriate.  Patient or patient representative verbalized consent for the use of Ambient Listening during the visit with  Mariam Miranda DO for chart documentation. 7/29/2025  10:51 EDT      This document has been electronically signed by Mariam Miranda DO   July 29, 2025 11:13 EDT    Dictated Utilizing Dragon Dictation: Part of this note may be an electronic transcription/translation of spoken language to printed text using the Dragon Dictation System.    Mariam Miranda D.O.  [facility]

## 2025-07-30 LAB
ALBUMIN UR-MCNC: <1.2 MG/DL
CREAT UR-MCNC: 31.5 MG/DL
MICROALBUMIN/CREAT UR: NORMAL MG/G{CREAT}

## 2025-07-31 DIAGNOSIS — R91.1 NODULE OF RIGHT LUNG: Primary | ICD-10-CM

## 2025-08-04 RX ORDER — AMLODIPINE BESYLATE 10 MG/1
10 TABLET ORAL DAILY
Qty: 90 TABLET | Refills: 1 | Status: SHIPPED | OUTPATIENT
Start: 2025-08-04

## 2025-08-12 ENCOUNTER — HOSPITAL ENCOUNTER (OUTPATIENT)
Facility: HOSPITAL | Age: 80
Discharge: HOME | End: 2025-08-12
Admitting: FAMILY MEDICINE
Payer: MEDICARE

## 2025-08-12 DIAGNOSIS — R91.1 NODULE OF RIGHT LUNG: ICD-10-CM

## 2025-08-12 PROCEDURE — 71250 CT THORAX DX C-: CPT

## 2025-08-18 ENCOUNTER — TELEPHONE (OUTPATIENT)
Dept: FAMILY MEDICINE CLINIC | Facility: CLINIC | Age: 80
End: 2025-08-18
Payer: MEDICARE